# Patient Record
Sex: MALE | Race: WHITE | NOT HISPANIC OR LATINO | Employment: OTHER | URBAN - METROPOLITAN AREA
[De-identification: names, ages, dates, MRNs, and addresses within clinical notes are randomized per-mention and may not be internally consistent; named-entity substitution may affect disease eponyms.]

---

## 2017-07-13 ENCOUNTER — APPOINTMENT (OUTPATIENT)
Dept: LAB | Facility: HOSPITAL | Age: 72
End: 2017-07-13
Attending: INTERNAL MEDICINE
Payer: MEDICARE

## 2017-07-13 ENCOUNTER — ALLSCRIPTS OFFICE VISIT (OUTPATIENT)
Dept: OTHER | Facility: OTHER | Age: 72
End: 2017-07-13

## 2017-07-13 ENCOUNTER — TRANSCRIBE ORDERS (OUTPATIENT)
Dept: ADMINISTRATIVE | Facility: HOSPITAL | Age: 72
End: 2017-07-13

## 2017-07-13 DIAGNOSIS — I25.10 ATHEROSCLEROSIS OF NATIVE CORONARY ARTERY OF NATIVE HEART WITHOUT ANGINA PECTORIS: ICD-10-CM

## 2017-07-13 DIAGNOSIS — I78.1 NEVUS, NON-NEOPLASTIC: ICD-10-CM

## 2017-07-13 DIAGNOSIS — I78.1 NEVUS, NON-NEOPLASTIC: Primary | ICD-10-CM

## 2017-07-13 LAB
ALBUMIN SERPL BCP-MCNC: 3.6 G/DL (ref 3.5–5)
ALP SERPL-CCNC: 51 U/L (ref 46–116)
ALT SERPL W P-5'-P-CCNC: 31 U/L (ref 12–78)
ANION GAP SERPL CALCULATED.3IONS-SCNC: 9 MMOL/L (ref 4–13)
AST SERPL W P-5'-P-CCNC: 16 U/L (ref 5–45)
BILIRUB SERPL-MCNC: 1.4 MG/DL (ref 0.2–1)
BUN SERPL-MCNC: 16 MG/DL (ref 5–25)
CALCIUM SERPL-MCNC: 9.1 MG/DL (ref 8.3–10.1)
CHLORIDE SERPL-SCNC: 100 MMOL/L (ref 100–108)
CHOLEST SERPL-MCNC: 131 MG/DL (ref 50–200)
CO2 SERPL-SCNC: 29 MMOL/L (ref 21–32)
CREAT SERPL-MCNC: 1.18 MG/DL (ref 0.6–1.3)
GFR SERPL CREATININE-BSD FRML MDRD: >60 ML/MIN/1.73SQ M
GLUCOSE P FAST SERPL-MCNC: 118 MG/DL (ref 65–99)
HDLC SERPL-MCNC: 54 MG/DL (ref 40–60)
LDLC SERPL CALC-MCNC: 65 MG/DL (ref 0–100)
POTASSIUM SERPL-SCNC: 4.4 MMOL/L (ref 3.5–5.3)
PROT SERPL-MCNC: 7.3 G/DL (ref 6.4–8.2)
SODIUM SERPL-SCNC: 138 MMOL/L (ref 136–145)
TRIGL SERPL-MCNC: 62 MG/DL

## 2017-07-13 PROCEDURE — 36415 COLL VENOUS BLD VENIPUNCTURE: CPT | Performed by: INTERNAL MEDICINE

## 2017-07-13 PROCEDURE — 80053 COMPREHEN METABOLIC PANEL: CPT | Performed by: INTERNAL MEDICINE

## 2017-07-13 PROCEDURE — 80061 LIPID PANEL: CPT

## 2017-07-26 ENCOUNTER — ALLSCRIPTS OFFICE VISIT (OUTPATIENT)
Dept: OTHER | Facility: OTHER | Age: 72
End: 2017-07-26

## 2017-07-27 ENCOUNTER — ALLSCRIPTS OFFICE VISIT (OUTPATIENT)
Dept: OTHER | Facility: OTHER | Age: 72
End: 2017-07-27

## 2017-09-29 ENCOUNTER — APPOINTMENT (OUTPATIENT)
Dept: LAB | Facility: HOSPITAL | Age: 72
End: 2017-09-29
Payer: MEDICARE

## 2017-09-29 ENCOUNTER — TRANSCRIBE ORDERS (OUTPATIENT)
Dept: ADMINISTRATIVE | Facility: HOSPITAL | Age: 72
End: 2017-09-29

## 2017-09-29 DIAGNOSIS — Z57.8 EMPLOYEE EXPOSURE TO BLOOD: ICD-10-CM

## 2017-09-29 DIAGNOSIS — Z11.59 SCREENING EXAMINATION FOR POLIOMYELITIS: ICD-10-CM

## 2017-09-29 DIAGNOSIS — Z11.59 SCREENING EXAMINATION FOR POLIOMYELITIS: Primary | ICD-10-CM

## 2017-09-29 LAB — HCV AB SER QL: NORMAL

## 2017-09-29 PROCEDURE — 86803 HEPATITIS C AB TEST: CPT

## 2017-09-29 PROCEDURE — 36415 COLL VENOUS BLD VENIPUNCTURE: CPT

## 2017-09-29 SDOH — HEALTH STABILITY - PHYSICAL HEALTH: OCCUPATIONAL EXPOSURE TO OTHER RISK FACTORS: Z57.8

## 2017-10-18 ENCOUNTER — GENERIC CONVERSION - ENCOUNTER (OUTPATIENT)
Dept: CARDIOLOGY CLINIC | Facility: CLINIC | Age: 72
End: 2017-10-18

## 2017-10-18 ENCOUNTER — GENERIC CONVERSION - ENCOUNTER (OUTPATIENT)
Dept: OTHER | Facility: OTHER | Age: 72
End: 2017-10-18

## 2017-10-27 ENCOUNTER — GENERIC CONVERSION - ENCOUNTER (OUTPATIENT)
Dept: OTHER | Facility: OTHER | Age: 72
End: 2017-10-27

## 2018-01-09 ENCOUNTER — TRANSCRIBE ORDERS (OUTPATIENT)
Dept: ADMINISTRATIVE | Facility: HOSPITAL | Age: 73
End: 2018-01-09

## 2018-01-09 ENCOUNTER — ALLSCRIPTS OFFICE VISIT (OUTPATIENT)
Dept: OTHER | Facility: OTHER | Age: 73
End: 2018-01-09

## 2018-01-09 DIAGNOSIS — E78.5 HYPERLIPIDEMIA, UNSPECIFIED HYPERLIPIDEMIA TYPE: ICD-10-CM

## 2018-01-09 DIAGNOSIS — R00.1 SEVERE SINUS BRADYCARDIA: ICD-10-CM

## 2018-01-09 DIAGNOSIS — I47.1 PAROXYSMAL SUPRAVENTRICULAR TACHYCARDIA (HCC): Primary | ICD-10-CM

## 2018-01-10 NOTE — PROGRESS NOTES
Assessment   Assessed    1  Atrial tachycardia, paroxysmal (427 0) (I47 1)   2  Bradycardia (427 89) (R00 1)   3  Dyslipidemia (272 4) (E78 5)   4  Essential hypertension (401 9) (I10)   5  Impaired fasting glucose (790 21) (R73 01)   6  Obesity, Class I, BMI 30-34 9 (278 00) (E66 9)   7  Premature atrial contractions (427 61) (I49 1)   8  Sinus bradycardia-tachycardia syndrome (427 81) (I49 5)    Plan   Atrial tachycardia, paroxysmal, Bradycardia, Sinus bradycardia-tachycardia syndrome    · HOLTER MONITOR - 24 HOUR; Status:Active; Requested [id number]; Perform:[facility]; PQT:[id number]; Last Updated By:Samantha Watkins; 2018 10:22:54 AM;Ordered; For:Atrial tachycardia, paroxysmal, Bradycardia, Sinus bradycardia-tachycardia syndrome; Ordered By:Wayne Irvin;  Atrial tachycardia, paroxysmal, Dyslipidemia, Essential hypertension    · (1) COMPREHENSIVE METABOLIC PANEL; Status:Active; Requested [id number]; Perform:[facility]; TVV:[id number];MGXNTUL; For:Atrial tachycardia, paroxysmal, Dyslipidemia, Essential hypertension; Ordered By:Wayne Irvin;  Atrial tachycardia, paroxysmal, Essential hypertension    · EKG/ECG- POC; Status:Complete;   Done: [id number]   Perform: In Office; [id number]; Last Updated By:Diamante Watson; 2018 9:23:37 AM;Ordered; For:Atrial tachycardia, paroxysmal, Essential hypertension; Ordered By:Wayne Irvin;  Dyslipidemia, Essential hypertension    · (1) LIPID PANEL, FASTING; Status:Active; Requested [id number]; Perform:[facility]; [id number];BDGPDKS; For:Dyslipidemia, Essential hypertension; Ordered By:Wayne Irvin;           1  Continue present medications  2   Update lipids, CMP, and 24 hour Holter monitor soon  3   Six month cardiology follow-up with EKG         Discussion/Summary   Cardiology Discussion [facility]       Asymptomatic sick sinus syndrome with currently resolved marked sinus bradycardia-tachycardia  Chronic PACs with 26 1% frequency on 9/10/15 Holter monitor and known atrial tachycardia runs of 5-11 beats  Well-controlled essential hypertension and hypertensive heart disease Chronically impaired fasting glucose History of prior hyperkalemia and azotemia on ACE inhibition, currently suppressed History of right renal mass cryoablation 5/17/12 with follow-up at [facility] Controlled dyslipidemia Controlled recurrent diverticulitis Stable BPH and urinary frequency/nocturia Chronic obesity, class I, with a 6 8 pound weight gain in approximately 6 months, partially attributed to wearing of work boots  Normal nuclear stress study on 10/4/16 with 70% LVEF  Goals and Barriers: The patient has the current Goals: Maintenance of cardiovascular health  The patent has the current Barriers: None  Patient's Capacity to Self-Care: Patient is able to Self-Care  Medication SE Review and Pt Understands Tx: The treatment plan was reviewed with the patient/guardian  The patient/guardian understands and agrees with the treatment plan    Counseling Documentation With Imm: The patient was counseled regarding diagnostic results,-- instructions for management,-- risk factor reductions,-- prognosis,-- patient and family education,-- impressions,-- risks and benefits of treatment options,-- importance of compliance with treatment  Self Referrals:    Self Referrals: Yes    Transitional Care: Co-manage care with PCP/Referring Provider  Chief Complaint   Chief Complaint Free Text Note Form: Bernardo Velasquez is here today for a six month followup  He did not get his holter monitor done, or bloodwork that was ordered  He states he will do it another time  An Ekg was performed in the office today  JW      History of Present Illness   Cardiology HPI Free Text Note Form [facility]:    75-year-old man denies any current cardiopulmonary or medical symptoms  He has just returned from a trip to [place]  He had no health issues while on this trip  Review of Systems   ROS Reviewed:    ROS reviewed  All systems negative  Active Problems   Problems    1  Abnormal nuclear stress test (794 39) (R94 39)   2  Atrial tachycardia, paroxysmal (427 0) (I47 1)   3  Bradycardia (427 89) (R00 1)   4  Diverticulitis of colon (562 11) (K57 32)   5  Dyslipidemia (272 4) (E78 5)   6  Essential hypertension (401 9) (I10)   7  History of gout (V12 29) (Z87 39)   8  Impaired fasting glucose (790 21) (R73 01)   9  Obesity, Class I, BMI 30-34 9 (278 00) (E66 9)   10  Premature atrial contractions (427 61) (I49 1)   11  Sinus bradycardia-tachycardia syndrome (427 81) (I49 5)    Past Medical History   Active Problems And Past Medical History Reviewed: The active problems and past medical history were reviewed and updated today  Surgical History   Surgical History Reviewed: The surgical history was reviewed and updated today  Family History   Father    1  Family history of myocardial infarction (V17 3) (Z82 49)  Sibling    2  Family history of malignant neoplasm of prostate (V16 42) (Z80 45)  Family History Reviewed: The family history was reviewed and updated today  Social History   Problems    · Denied: History of Drug use   · Moderate alcohol use   · Never smoked tobacco (V49 89) (Z78 9)   · Single  Social History Reviewed: The social history was reviewed and is unchanged  Current Meds    1  Aspirin 81 MG TABS; TAKE 1 TABLET DAILY; Therapy: (Recorded:18Oct2017) to Recorded   2  Atorvastatin Calcium 10 MG Oral Tablet; Take 1 tablet daily; Therapy: [id number] to Recorded   3  Daily Multivitamin TABS; TAKE 1 TABLET DAILY; Therapy: (Recorded:18Oct2017) to Recorded   4  Fish Oil Concentrate 1000 MG Oral Capsule; Therapy: 92LGN7305 to Recorded   5  Lisinopril 20 MG Oral Tablet; Take 1 tablet daily; Therapy: (Recorded:52Mtx4232) to Recorded   6   Probiotic CAPS; TAKE AS DIRECTED; Therapy: (Recorded:39Yuf3902) to Recorded   7  Tamsulosin HCl - 0 4 MG Oral Capsule; Therapy: (Recorded:48Vna2772) to Recorded   8  Tart Cherry Advanced Oral Capsule; Therapy: 22FEE7647 to Recorded  Medication List Reviewed: The medication list was reviewed and updated today  Allergies   Medication    1  No Known Drug Allergies    Vitals   Vital Signs    Recorded: 56KIM7111 09:28AM   Heart Rate 70, Apical   Systolic 032, RUE, Sitting   Diastolic 80, RUE, Sitting   BP CUFF SIZE Large   Height 5 ft 6 5 in   Weight 209 lb    BMI Calculated 33 23   BSA Calculated 2 05   O2 Saturation 97, RA     Physical Exam        Constitutional      General appearance: Abnormal  -- Moderately obese white male in no acute distress  He is alert, oriented, and cooperative  Eyes      Conjunctiva and Sclera examination: Conjunctiva pink, sclera anicteric  Ears, Nose, Mouth, and Throat - Oropharynx: Clear, nares are clear, mucous membranes are moist       Neck      Neck and thyroid: Normal, supple, trachea midline, no thyromegaly  Pulmonary      Respiratory effort: No increased work of breathing or signs of respiratory distress  Auscultation of lungs: Clear to auscultation, no rales, no rhonchi, no wheezing, good air movement  Cardiovascular      Auscultation of heart: Abnormal  -- Irregular cardiac rhythm with heart rate varying from 50 bpm to 85 BPM in the sitting position  No murmur, gallop, rub, click  Carotid pulses: Normal, 2+ bilaterally  Peripheral vascular exam: Normal pulses throughout, no tenderness, erythema or swelling  Pedal pulses: Normal, 2+ bilaterally  Examination of extremities for edema and/or varicosities: Normal        Abdomen      Abdomen: Abnormal  -- Moderately obese and nontender with no masses or organomegaly  Liver and spleen: No hepatomegaly or splenomegaly  Musculoskeletal Gait and station: Normal gait  -- Digits and nails: Normal without clubbing or cyanosis  -- Inspection/palpation of joints, bones, and muscles: Normal, ROM normal        Skin - Skin and subcutaneous tissue: Normal without rashes or lesions  Skin is warm and well perfused, normal turgor  Neurologic - Cranial nerves: II - XII intact  -- Speech: Normal        Psychiatric - Orientation to person, place, and time: Normal -- Mood and affect: Normal       Results/Data   ECG Report:  1/9/18   atrial rhythm old inferior infarct R-wave progression marked sinus bradycardia and new ectopic atrial rhythm since 07/26/2017                Signatures    Electronically signed by : JETT Rosenbaum ; Jan 9 2018  7:37PM EST                       (Author)

## 2018-01-12 VITALS
WEIGHT: 200 LBS | HEART RATE: 52 BPM | BODY MASS INDEX: 31.39 KG/M2 | SYSTOLIC BLOOD PRESSURE: 110 MMHG | HEIGHT: 67 IN | TEMPERATURE: 98.5 F | OXYGEN SATURATION: 99 % | DIASTOLIC BLOOD PRESSURE: 60 MMHG

## 2018-01-13 VITALS
OXYGEN SATURATION: 96 % | SYSTOLIC BLOOD PRESSURE: 100 MMHG | WEIGHT: 201.8 LBS | BODY MASS INDEX: 31.67 KG/M2 | HEIGHT: 67 IN | HEART RATE: 45 BPM | DIASTOLIC BLOOD PRESSURE: 60 MMHG

## 2018-01-13 VITALS
BODY MASS INDEX: 31.55 KG/M2 | DIASTOLIC BLOOD PRESSURE: 72 MMHG | TEMPERATURE: 98.1 F | WEIGHT: 201.05 LBS | OXYGEN SATURATION: 95 % | HEIGHT: 67 IN | HEART RATE: 78 BPM | SYSTOLIC BLOOD PRESSURE: 120 MMHG

## 2018-01-22 VITALS
SYSTOLIC BLOOD PRESSURE: 130 MMHG | HEIGHT: 67 IN | OXYGEN SATURATION: 96 % | HEART RATE: 106 BPM | WEIGHT: 208.6 LBS | DIASTOLIC BLOOD PRESSURE: 88 MMHG | BODY MASS INDEX: 32.74 KG/M2

## 2018-01-22 VITALS — HEART RATE: 49 BPM

## 2018-01-23 VITALS
WEIGHT: 209 LBS | BODY MASS INDEX: 32.8 KG/M2 | HEIGHT: 67 IN | HEART RATE: 70 BPM | DIASTOLIC BLOOD PRESSURE: 80 MMHG | OXYGEN SATURATION: 97 % | SYSTOLIC BLOOD PRESSURE: 118 MMHG

## 2018-02-06 ENCOUNTER — HOSPITAL ENCOUNTER (OUTPATIENT)
Dept: NON INVASIVE DIAGNOSTICS | Facility: HOSPITAL | Age: 73
Discharge: HOME | End: 2018-02-06
Attending: INTERNAL MEDICINE
Payer: MEDICARE

## 2018-02-06 ENCOUNTER — APPOINTMENT (OUTPATIENT)
Dept: LAB | Facility: HOSPITAL | Age: 73
End: 2018-02-06
Attending: INTERNAL MEDICINE
Payer: MEDICARE

## 2018-02-06 DIAGNOSIS — R00.1 SEVERE SINUS BRADYCARDIA: ICD-10-CM

## 2018-02-06 DIAGNOSIS — I47.1 PAROXYSMAL SUPRAVENTRICULAR TACHYCARDIA (HCC): ICD-10-CM

## 2018-02-06 DIAGNOSIS — E78.5 HYPERLIPIDEMIA, UNSPECIFIED HYPERLIPIDEMIA TYPE: ICD-10-CM

## 2018-02-06 LAB
ALBUMIN SERPL BCP-MCNC: 3.8 G/DL (ref 3.5–5)
ALP SERPL-CCNC: 52 U/L (ref 46–116)
ALT SERPL W P-5'-P-CCNC: 45 U/L (ref 12–78)
ANION GAP SERPL CALCULATED.3IONS-SCNC: 7 MMOL/L (ref 4–13)
AST SERPL W P-5'-P-CCNC: 24 U/L (ref 5–45)
BILIRUB SERPL-MCNC: 0.8 MG/DL (ref 0.2–1)
BUN SERPL-MCNC: 23 MG/DL (ref 5–25)
CALCIUM SERPL-MCNC: 9.1 MG/DL (ref 8.3–10.1)
CHLORIDE SERPL-SCNC: 100 MMOL/L (ref 100–108)
CHOLEST SERPL-MCNC: 154 MG/DL (ref 50–200)
CO2 SERPL-SCNC: 29 MMOL/L (ref 21–32)
CREAT SERPL-MCNC: 1.07 MG/DL (ref 0.6–1.3)
GFR SERPL CREATININE-BSD FRML MDRD: 68 ML/MIN/1.73SQ M
GLUCOSE P FAST SERPL-MCNC: 118 MG/DL (ref 65–99)
HDLC SERPL-MCNC: 51 MG/DL (ref 40–60)
LDLC SERPL CALC-MCNC: 85 MG/DL (ref 0–100)
POTASSIUM SERPL-SCNC: 4.3 MMOL/L (ref 3.5–5.3)
PROT SERPL-MCNC: 7.5 G/DL (ref 6.4–8.2)
SODIUM SERPL-SCNC: 136 MMOL/L (ref 136–145)
TRIGL SERPL-MCNC: 88 MG/DL

## 2018-02-06 PROCEDURE — 93225 XTRNL ECG REC<48 HRS REC: CPT

## 2018-02-06 PROCEDURE — 93226 XTRNL ECG REC<48 HR SCAN A/R: CPT

## 2018-02-06 PROCEDURE — 80053 COMPREHEN METABOLIC PANEL: CPT

## 2018-02-06 PROCEDURE — 36415 COLL VENOUS BLD VENIPUNCTURE: CPT

## 2018-02-06 PROCEDURE — 80061 LIPID PANEL: CPT

## 2018-02-12 ENCOUNTER — TELEPHONE (OUTPATIENT)
Dept: CARDIOLOGY CLINIC | Facility: CLINIC | Age: 73
End: 2018-02-12

## 2018-02-12 PROCEDURE — 93227 XTRNL ECG REC<48 HR R&I: CPT | Performed by: INTERNAL MEDICINE

## 2018-02-12 NOTE — TELEPHONE ENCOUNTER
----- Message from Claude Leaf, MD sent at 2/12/2018 11:22 AM EST -----  Call patient regarding Holter monitor  Results show some periods of rapid heartbeat but nothing serious  Will discuss in detail at follow-up

## 2018-02-12 NOTE — TELEPHONE ENCOUNTER
Left message about holter monitor results - nothing serious and a few rapid heart beats  Dr Velázquez Courser will discuss in greater detail on next visit

## 2018-03-22 ENCOUNTER — OFFICE VISIT (OUTPATIENT)
Dept: SURGERY | Facility: CLINIC | Age: 73
End: 2018-03-22
Payer: MEDICARE

## 2018-03-22 VITALS
TEMPERATURE: 98.7 F | BODY MASS INDEX: 33.12 KG/M2 | HEIGHT: 67 IN | DIASTOLIC BLOOD PRESSURE: 78 MMHG | HEART RATE: 80 BPM | SYSTOLIC BLOOD PRESSURE: 138 MMHG | WEIGHT: 211 LBS

## 2018-03-22 DIAGNOSIS — S01.01XA LACERATION OF SCALP, INITIAL ENCOUNTER: Primary | ICD-10-CM

## 2018-03-22 PROCEDURE — 99213 OFFICE O/P EST LOW 20 MIN: CPT | Performed by: SPECIALIST

## 2018-03-22 RX ORDER — TAMSULOSIN HYDROCHLORIDE 0.4 MG/1
0.4 CAPSULE ORAL
COMMUNITY

## 2018-03-22 RX ORDER — ERGOCALCIFEROL (VITAMIN D2) 10 MCG
1 TABLET ORAL DAILY
COMMUNITY

## 2018-03-22 RX ORDER — CHLORTHALIDONE 25 MG/1
25 TABLET ORAL
COMMUNITY
End: 2018-05-03 | Stop reason: CLARIF

## 2018-03-22 RX ORDER — ATORVASTATIN CALCIUM 10 MG/1
1 TABLET, FILM COATED ORAL DAILY
COMMUNITY
Start: 2017-07-26 | End: 2019-12-18 | Stop reason: SDUPTHER

## 2018-03-22 RX ORDER — LISINOPRIL 20 MG/1
1 TABLET ORAL DAILY
COMMUNITY

## 2018-03-22 RX ORDER — CHLORAL HYDRATE 500 MG
CAPSULE ORAL
COMMUNITY
Start: 2017-07-26 | End: 2019-02-11

## 2018-03-22 NOTE — PROGRESS NOTES
Assessment/Plan:  Seda Ferreira comes today to value a laceration of the skull  This was repaired with surgical glue  It was a flap in sutures were not placed  An MRI was performed which was negative  The areas somewhat dry  I have recommended a put a small amount of bacitracin or Neosporin on this twice a day  I will check him in two weeks  Diagnoses and all orders for this visit:    Laceration of scalp, initial encounter    Other orders  -     tamsulosin (FLOMAX) 0 4 mg; Take 0 4 mg by mouth  -     Probiotic Product (PROBIOTIC-10) CAPS; Take by mouth  -     lisinopril (ZESTRIL) 20 mg tablet; Take 1 tablet by mouth daily  -     Omega-3 Fatty Acids (FISH OIL) 1,000 mg; Take by mouth  -     Multiple Vitamin (DAILY VALUE MULTIVITAMIN) TABS; Take 1 tablet by mouth daily  -     chlorthalidone 25 mg tablet; Take 25 mg by mouth  -     atorvastatin (LIPITOR) 10 mg tablet; Take 1 tablet by mouth daily  -     aspirin 81 MG tablet; Take 1 tablet by mouth daily  -     Acai 500 MG CAPS; Take by mouth  -     Misc Natural Products (TART CHERRY ADVANCED PO); Take by mouth          Subjective:     Patient ID: Sami Goode is a 74 y o  male  Seda Ferreira comes today to evaluate a laceration of the skull  He was on vacation when he fell over suitcase  He went to an emergency room in [place] where glue is applied  No sutures were applied  An MRI was performed which was negative  He was told to follow up with a surgeon and comes today for evaluation  Review of Systems   Skin:        Status post laceration of his left skull         Objective:     Physical Exam   Skin:   Well healed  Wound clean  Flap laceration  Somewhat dry  No evidence of infection

## 2018-04-05 ENCOUNTER — OFFICE VISIT (OUTPATIENT)
Dept: SURGERY | Facility: CLINIC | Age: 73
End: 2018-04-05
Payer: MEDICARE

## 2018-04-05 VITALS
WEIGHT: 211 LBS | BODY MASS INDEX: 33.12 KG/M2 | HEIGHT: 67 IN | DIASTOLIC BLOOD PRESSURE: 78 MMHG | SYSTOLIC BLOOD PRESSURE: 120 MMHG | TEMPERATURE: 98.1 F | HEART RATE: 67 BPM

## 2018-04-05 DIAGNOSIS — S01.01XD SCALP LACERATION, SUBSEQUENT ENCOUNTER: Primary | ICD-10-CM

## 2018-04-05 PROCEDURE — 99213 OFFICE O/P EST LOW 20 MIN: CPT | Performed by: SPECIALIST

## 2018-04-05 NOTE — PROGRESS NOTES
Assessment/Plan:  Narcisa Talamantes comes today to evaluate the laceration of his skull  He is doing well  The majority of the glue is still present  I will check him in three weeks  He will call sooner for any problems  There is no evidence of infection at this time  Diagnoses and all orders for this visit:    Scalp laceration, subsequent encounter          Subjective:     Patient ID: Lila Orourke is a 74 y o  male  Kriss Parcel comes back today to evaluate his laceration of the skull  This was injured in was repaired with surgical glue  He was told with, left after two weeks but most of it is still present  He has no pain  He appears to be doing well  Review of Systems  status post laceration of skull    Objective:     Physical Exam   Skin:   Healing well  Wound clean  No evidence of infection    Majority of glue is still present on the incision

## 2018-05-03 ENCOUNTER — OFFICE VISIT (OUTPATIENT)
Dept: SURGERY | Facility: CLINIC | Age: 73
End: 2018-05-03
Payer: MEDICARE

## 2018-05-03 VITALS
SYSTOLIC BLOOD PRESSURE: 120 MMHG | HEIGHT: 67 IN | WEIGHT: 211 LBS | HEART RATE: 79 BPM | BODY MASS INDEX: 33.12 KG/M2 | DIASTOLIC BLOOD PRESSURE: 78 MMHG | TEMPERATURE: 98 F

## 2018-05-03 DIAGNOSIS — S01.01XS LACERATION OF SCALP, SEQUELA: Primary | ICD-10-CM

## 2018-05-03 PROCEDURE — 99213 OFFICE O/P EST LOW 20 MIN: CPT | Performed by: SPECIALIST

## 2018-08-08 ENCOUNTER — OFFICE VISIT (OUTPATIENT)
Dept: CARDIOLOGY CLINIC | Facility: CLINIC | Age: 73
End: 2018-08-08
Payer: MEDICARE

## 2018-08-08 VITALS
SYSTOLIC BLOOD PRESSURE: 130 MMHG | DIASTOLIC BLOOD PRESSURE: 70 MMHG | HEIGHT: 66 IN | BODY MASS INDEX: 32.5 KG/M2 | WEIGHT: 202.2 LBS | HEART RATE: 57 BPM | OXYGEN SATURATION: 97 %

## 2018-08-08 DIAGNOSIS — I49.1 PREMATURE ATRIAL CONTRACTIONS: ICD-10-CM

## 2018-08-08 DIAGNOSIS — E78.5 DYSLIPIDEMIA: ICD-10-CM

## 2018-08-08 DIAGNOSIS — I47.1 ATRIAL TACHYCARDIA DETERMINED BY ELECTROCARDIOGRAPHY (HCC): ICD-10-CM

## 2018-08-08 DIAGNOSIS — I49.5 SICK SINUS SYNDROME (HCC): Primary | ICD-10-CM

## 2018-08-08 DIAGNOSIS — E66.9 CLASS 1 OBESITY: ICD-10-CM

## 2018-08-08 DIAGNOSIS — I11.9 HYPERTENSIVE HEART DISEASE WITHOUT HEART FAILURE: ICD-10-CM

## 2018-08-08 DIAGNOSIS — N40.1 BENIGN PROSTATIC HYPERPLASIA WITH LOWER URINARY TRACT SYMPTOMS, SYMPTOM DETAILS UNSPECIFIED: ICD-10-CM

## 2018-08-08 DIAGNOSIS — R73.01 IMPAIRED FASTING GLUCOSE: ICD-10-CM

## 2018-08-08 DIAGNOSIS — I10 ESSENTIAL HYPERTENSION: ICD-10-CM

## 2018-08-08 PROCEDURE — 99214 OFFICE O/P EST MOD 30 MIN: CPT | Performed by: INTERNAL MEDICINE

## 2018-08-08 PROCEDURE — 93000 ELECTROCARDIOGRAM COMPLETE: CPT | Performed by: INTERNAL MEDICINE

## 2018-08-08 NOTE — PROGRESS NOTES
Cardiology Progress Note    ASSESSMENT:    1  Asymptomatic sick sinus syndrome with currently resolved marked sinus bradycardia-tachycardia  2  Chronic PACs with 4 8% frequency on 02/06/2018 Holter monitor and known atrial tachycardia runs of up to 46 beats with fastest rate during  bpm    3  Well-controlled essential hypertension and hypertensive heart disease   4  Chronically impaired fasting glucose with most recent fasting blood glucose 118   5  History of prior hyperkalemia and azotemia on ACE inhibition, currently suppressed   6  History of right renal mass cryoablation 5/17/12 with previous follow-up at [facility], but patient now discharged from follow-up  7  Controlled dyslipidemia   8  Controlled recurrent diverticulitis   9  Stable BPH and urinary frequency/nocturia   10  Chronic obesity, class I, with a 6 8 pound weight loss in approximately 7 months, partially attributed to winter wearing of work boots  11  Normal nuclear stress study on 10/4/16 with 70% LVEF  Plan       Patient Instructions     1  Continue present medications  2   Cardiology follow-up in approximately 6 months with EKG and no lab work  3   Patient is having lab work in the near future and will have copies sent to me  HPI    This 74 y o  male  denies new cardiopulmonary and medical symptoms  The patient traveled to [facility] and [place] for 4 weeks from May to June of this year  He just came back from a weekend [address] [facility] and was recently in [facility] for a wedding  He will be traveling to [facility] after Thanksgiving  The patient just saw his internist Dr Lynda Trujillo MD today, who ordered some additional blood work, which the patient will have copied to me  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[place], and in [place], and has mainly commercial real estate      The patient's lab work from 02/06/2018 was reviewed and included normal lipid panel, CMP, except glucose of 118  His Holter monitor from 02/06/2018 was reviewed and showed 4 8% density of PACs and 0 8% PVCs with 41 episodes of SVT including longest run 46 beats and fastest rate 140 bpm   There were no correlating symptoms  Review of Systems    All other systems negative, except as noted in history of present illness    Historical Information   Past Medical History:   Diagnosis Date    Atrial tachycardia, paroxysmal ([facility] ) 01/09/2018    from allscripts chart    [facility]    [facility]    Dyslipidemia     Essential hypertension     Impaired fasting glucose     Premature atrial contractions     Sinus bradycardia-tachycardia syndrome ([facility] )      History reviewed  No pertinent surgical history  History   Alcohol Use    1 2 oz/week    2 Glasses of wine per week     Comment: a day      History   Drug Use No     History   Smoking Status    Never Smoker   Smokeless Tobacco    Never Used       Family History:  Family History   Problem Relation Age of Onset    Microcephaly Father     Heart attack Father          Meds/Allergies     Prior to Admission medications    Medication Sig Start Date End Date Taking?  Authorizing Provider   Acai 500 MG CAPS Take by mouth   Yes Historical Provider, MD   aspirin 81 MG tablet Take 1 tablet by mouth daily   Yes Historical Provider, MD   atorvastatin (LIPITOR) 10 mg tablet Take 1 tablet by mouth daily 7/26/17  Yes Historical Provider, MD   lisinopril (ZESTRIL) 20 mg tablet Take 1 tablet by mouth daily   Yes Historical Provider, MD   Misc Natural Products (TART CHERRY ADVANCED PO) Take by mouth   Yes Historical Provider, MD   Multiple Vitamin (DAILY VALUE MULTIVITAMIN) TABS Take 1 tablet by mouth daily   Yes Historical Provider, MD   Omega-3 Fatty Acids (FISH OIL) 1,000 mg Take by mouth 7/26/17  Yes Historical Provider, MD   Probiotic Product (PROBIOTIC-10) CAPS Take by mouth   Yes Historical Provider, MD   tamsulosin (FLOMAX) 0 4 mg Take 0 4 mg by mouth   Yes Historical Provider, MD       No Known Allergies      Vitals:    08/08/18 1259   BP: 130/70   BP Location: Right arm   Patient Position: Sitting   Cuff Size: Large   Pulse: 57   SpO2: 97%   Weight: 91 7 kg (202 lb 3 2 oz)   Height: 5' 5 75" (1 67 m)       Body mass index is 32 88 kg/m²  6 8 pound weight loss in approximately 7 months    Physical Exam:    General Appearance:  Alert, cooperative, no distress, appears stated age and is moderately obese  Head:  Normocephalic, without obvious abnormality, atraumatic   Eyes:  PERRL, conjunctiva/corneas clear, EOM's intact,   both eyes   Ears:  Normal TM's and external ear canals, both ears   Nose: Nares normal, septum midline, mucosa normal, no drainage or sinus tenderness   Throat: Lips, mucosa, and tongue normal; teeth and gums normal   Neck: Supple, symmetrical, trachea midline, no adenopathy, thyroid: not enlarged, symmetric, no tenderness/mass/nodules, no carotid bruit or JVD   Back:   Symmetric, no curvature, ROM normal, no CVA tenderness   Lungs:   Clear to auscultation bilaterally, respirations unlabored   Chest Wall:  No tenderness or deformity   Heart:  Irregular cardiac rhythm, S1, S2 normal, no murmur, rub or gallop   Abdomen:   Soft, non-tender, bowel sounds active all four quadrants,  no masses, no organomegaly with moderate obesity noted   Extremities: Extremities normal, atraumatic, no cyanosis or edema   Pulses: 2+ and symmetric   Skin: Skin showed normal color, texture, turgor and no rashes or lesions   Lymph nodes: Cervical, supraclavicular, and axillary nodes normal   Neurologic: Normal         Cardiographics    ECG 08/08/2018:    Sinus rhythm with frequent PACs and aberrant conduction  Nonspecific inferior and lateral precordial T abnormalities  Rule out old inferior infarct  Poor R-wave progression  Faster heart rate with more frequent PACs compared to 07/26/2017      Imaging    Chest X-Ray :  No Chest XR results available for this patient            Lab Review       Lab Results   Component Value Date     02/06/2018    K 4 3 02/06/2018     02/06/2018    CO2 29 02/06/2018    ANIONGAP 7 02/06/2018    BUN 23 02/06/2018    CREATININE 1 07 02/06/2018    GLUCOSE 111 04/20/2016    GLUF 118 (H) 02/06/2018    CALCIUM 9 1 02/06/2018    AST 24 02/06/2018    ALT 45 02/06/2018    ALKPHOS 52 02/06/2018    PROT 7 5 02/06/2018    BILITOT 0 80 02/06/2018    EGFR 68 02/06/2018       Lab Results   Component Value Date    CHOL 154 02/06/2018    CHOL 131 07/13/2017    CHOL 144 04/20/2016     Lab Results   Component Value Date    HDL 51 02/06/2018    HDL 54 07/13/2017    HDL 44 04/20/2016     Lab Results   Component Value Date    LDLCALC 85 02/06/2018    LDLCALC 65 07/13/2017    LDLCALC 85 04/20/2016     Lab Results   Component Value Date    TRIG 88 02/06/2018    TRIG 62 07/13/2017    TRIG 76 04/20/2016     No components found for: CHOLHDL    Lab Results   Component Value Date    GLUCOSE 111 04/20/2016    CALCIUM 9 1 02/06/2018     02/06/2018    K 4 3 02/06/2018    CO2 29 02/06/2018     02/06/2018    BUN 23 02/06/2018    CREATININE 1 07 02/06/2018           Ngozi Xiong MD

## 2018-09-17 ENCOUNTER — APPOINTMENT (OUTPATIENT)
Dept: LAB | Facility: HOSPITAL | Age: 73
End: 2018-09-17
Payer: MEDICARE

## 2018-09-17 ENCOUNTER — TRANSCRIBE ORDERS (OUTPATIENT)
Dept: ADMINISTRATIVE | Facility: HOSPITAL | Age: 73
End: 2018-09-17

## 2018-09-17 ENCOUNTER — TELEPHONE (OUTPATIENT)
Dept: CARDIOLOGY CLINIC | Facility: CLINIC | Age: 73
End: 2018-09-17

## 2018-09-17 DIAGNOSIS — N13.8 ENLARGED PROSTATE WITH URINARY OBSTRUCTION: ICD-10-CM

## 2018-09-17 DIAGNOSIS — Z80.42 FAMILY HISTORY OF MALIGNANT NEOPLASM OF PROSTATE: ICD-10-CM

## 2018-09-17 DIAGNOSIS — E78.00 PURE HYPERCHOLESTEROLEMIA: ICD-10-CM

## 2018-09-17 DIAGNOSIS — I10 ESSENTIAL HYPERTENSION, MALIGNANT: Primary | ICD-10-CM

## 2018-09-17 DIAGNOSIS — I10 ESSENTIAL HYPERTENSION, MALIGNANT: ICD-10-CM

## 2018-09-17 DIAGNOSIS — G63 GOUTY NEURITIS (HCC): ICD-10-CM

## 2018-09-17 DIAGNOSIS — I25.10 ATHEROSCLEROSIS OF NATIVE CORONARY ARTERY OF NATIVE HEART WITHOUT ANGINA PECTORIS: ICD-10-CM

## 2018-09-17 DIAGNOSIS — M10.00 GOUTY NEURITIS (HCC): ICD-10-CM

## 2018-09-17 DIAGNOSIS — N40.1 ENLARGED PROSTATE WITH URINARY OBSTRUCTION: ICD-10-CM

## 2018-09-17 DIAGNOSIS — E78.00 PURE HYPERCHOLESTEROLEMIA: Primary | ICD-10-CM

## 2018-09-17 LAB
ALBUMIN SERPL BCP-MCNC: 3.9 G/DL (ref 3.5–5)
ALP SERPL-CCNC: 46 U/L (ref 46–116)
ALT SERPL W P-5'-P-CCNC: 38 U/L (ref 12–78)
ANION GAP SERPL CALCULATED.3IONS-SCNC: 5 MMOL/L (ref 4–13)
AST SERPL W P-5'-P-CCNC: 23 U/L (ref 5–45)
BASOPHILS # BLD AUTO: 0.06 THOUSANDS/ΜL (ref 0–0.1)
BASOPHILS NFR BLD AUTO: 1 % (ref 0–1)
BILIRUB SERPL-MCNC: 1.1 MG/DL (ref 0.2–1)
BILIRUB UR QL STRIP: NEGATIVE
BUN SERPL-MCNC: 11 MG/DL (ref 5–25)
CALCIUM SERPL-MCNC: 8.6 MG/DL (ref 8.3–10.1)
CHLORIDE SERPL-SCNC: 101 MMOL/L (ref 100–108)
CHOLEST SERPL-MCNC: 149 MG/DL (ref 50–200)
CLARITY UR: CLEAR
CO2 SERPL-SCNC: 28 MMOL/L (ref 21–32)
COLOR UR: NORMAL
CREAT SERPL-MCNC: 0.93 MG/DL (ref 0.6–1.3)
EOSINOPHIL # BLD AUTO: 0.2 THOUSAND/ΜL (ref 0–0.61)
EOSINOPHIL NFR BLD AUTO: 3 % (ref 0–6)
ERYTHROCYTE [DISTWIDTH] IN BLOOD BY AUTOMATED COUNT: 14.1 % (ref 11.6–15.1)
GFR SERPL CREATININE-BSD FRML MDRD: 80 ML/MIN/1.73SQ M
GLUCOSE P FAST SERPL-MCNC: 95 MG/DL (ref 65–99)
GLUCOSE UR STRIP-MCNC: NEGATIVE MG/DL
HCT VFR BLD AUTO: 43.3 % (ref 36.5–49.3)
HDLC SERPL-MCNC: 49 MG/DL (ref 40–60)
HGB BLD-MCNC: 14.2 G/DL (ref 12–17)
HGB UR QL STRIP.AUTO: NEGATIVE
IMM GRANULOCYTES # BLD AUTO: 0.06 THOUSAND/UL (ref 0–0.2)
IMM GRANULOCYTES NFR BLD AUTO: 1 % (ref 0–2)
KETONES UR STRIP-MCNC: NEGATIVE MG/DL
LDLC SERPL CALC-MCNC: 78 MG/DL (ref 0–100)
LEUKOCYTE ESTERASE UR QL STRIP: NEGATIVE
LYMPHOCYTES # BLD AUTO: 2.61 THOUSANDS/ΜL (ref 0.6–4.47)
LYMPHOCYTES NFR BLD AUTO: 36 % (ref 14–44)
MCH RBC QN AUTO: 31.1 PG (ref 26.8–34.3)
MCHC RBC AUTO-ENTMCNC: 32.8 G/DL (ref 31.4–37.4)
MCV RBC AUTO: 95 FL (ref 82–98)
MONOCYTES # BLD AUTO: 0.75 THOUSAND/ΜL (ref 0.17–1.22)
MONOCYTES NFR BLD AUTO: 10 % (ref 4–12)
NEUTROPHILS # BLD AUTO: 3.62 THOUSANDS/ΜL (ref 1.85–7.62)
NEUTS SEG NFR BLD AUTO: 49 % (ref 43–75)
NITRITE UR QL STRIP: NEGATIVE
NONHDLC SERPL-MCNC: 100 MG/DL
NRBC BLD AUTO-RTO: 0 /100 WBCS
PH UR STRIP.AUTO: 6.5 [PH] (ref 5–9)
PLATELET # BLD AUTO: 156 THOUSANDS/UL (ref 149–390)
PMV BLD AUTO: 10.4 FL (ref 8.9–12.7)
POTASSIUM SERPL-SCNC: 4 MMOL/L (ref 3.5–5.3)
PROT SERPL-MCNC: 7.1 G/DL (ref 6.4–8.2)
PROT UR STRIP-MCNC: NEGATIVE MG/DL
RBC # BLD AUTO: 4.56 MILLION/UL (ref 3.88–5.62)
SODIUM SERPL-SCNC: 134 MMOL/L (ref 136–145)
SP GR UR STRIP.AUTO: <=1.005 (ref 1–1.03)
TRIGL SERPL-MCNC: 112 MG/DL
TSH SERPL DL<=0.05 MIU/L-ACNC: 2.48 UIU/ML (ref 0.36–3.74)
URATE SERPL-MCNC: 8 MG/DL (ref 4.2–8)
UROBILINOGEN UR QL STRIP.AUTO: 0.2 E.U./DL
WBC # BLD AUTO: 7.3 THOUSAND/UL (ref 4.31–10.16)

## 2018-09-17 PROCEDURE — 84443 ASSAY THYROID STIM HORMONE: CPT

## 2018-09-17 PROCEDURE — 85025 COMPLETE CBC W/AUTO DIFF WBC: CPT | Performed by: INTERNAL MEDICINE

## 2018-09-17 PROCEDURE — 81003 URINALYSIS AUTO W/O SCOPE: CPT | Performed by: INTERNAL MEDICINE

## 2018-09-17 PROCEDURE — 84550 ASSAY OF BLOOD/URIC ACID: CPT

## 2018-09-17 PROCEDURE — 80061 LIPID PANEL: CPT | Performed by: INTERNAL MEDICINE

## 2018-09-17 PROCEDURE — 80053 COMPREHEN METABOLIC PANEL: CPT | Performed by: INTERNAL MEDICINE

## 2018-09-17 PROCEDURE — 36415 COLL VENOUS BLD VENIPUNCTURE: CPT | Performed by: INTERNAL MEDICINE

## 2019-02-01 ENCOUNTER — TELEPHONE (OUTPATIENT)
Dept: CARDIOLOGY CLINIC | Facility: CLINIC | Age: 74
End: 2019-02-01

## 2019-02-01 ENCOUNTER — DOCUMENTATION (OUTPATIENT)
Dept: CARDIOLOGY CLINIC | Facility: CLINIC | Age: 74
End: 2019-02-01

## 2019-02-01 ENCOUNTER — OFFICE VISIT (OUTPATIENT)
Dept: CARDIOLOGY CLINIC | Facility: CLINIC | Age: 74
End: 2019-02-01
Payer: MEDICARE

## 2019-02-01 VITALS
HEART RATE: 111 BPM | SYSTOLIC BLOOD PRESSURE: 112 MMHG | BODY MASS INDEX: 33.27 KG/M2 | HEIGHT: 66 IN | WEIGHT: 207 LBS | DIASTOLIC BLOOD PRESSURE: 70 MMHG | OXYGEN SATURATION: 96 %

## 2019-02-01 DIAGNOSIS — I47.1 ATRIAL TACHYCARDIA, PAROXYSMAL (HCC): Primary | ICD-10-CM

## 2019-02-01 DIAGNOSIS — E66.9 CLASS 1 OBESITY: ICD-10-CM

## 2019-02-01 DIAGNOSIS — I49.5 SICK SINUS SYNDROME (HCC): ICD-10-CM

## 2019-02-01 DIAGNOSIS — I10 ESSENTIAL HYPERTENSION: ICD-10-CM

## 2019-02-01 DIAGNOSIS — E78.5 DYSLIPIDEMIA: ICD-10-CM

## 2019-02-01 DIAGNOSIS — I11.9 HYPERTENSIVE HEART DISEASE WITHOUT HEART FAILURE: ICD-10-CM

## 2019-02-01 DIAGNOSIS — I49.1 PREMATURE ATRIAL CONTRACTIONS: ICD-10-CM

## 2019-02-01 PROCEDURE — 99215 OFFICE O/P EST HI 40 MIN: CPT | Performed by: INTERNAL MEDICINE

## 2019-02-01 PROCEDURE — 93000 ELECTROCARDIOGRAM COMPLETE: CPT | Performed by: INTERNAL MEDICINE

## 2019-02-01 NOTE — PATIENT INSTRUCTIONS
1  Patient will obtain pulse oximeter for home use and monitor heart rate until recheck  2  Follow-up in 1 week with rhythm strip  3  Dr Rene Cortez will transmit current EKG and ask EP to review current and prior EKGs and let me know what their thoughts are on current rhythm  4  If this tachydysrhythmia persists, will consider low-dose metoprolol succinate and possible oral anticoagulation at the time of next visit

## 2019-02-01 NOTE — TELEPHONE ENCOUNTER
Received fax from Dr Ervin Mooney office, asking Dr Jessica Steinberg to please review EKG and OV from today for a second opinion  EKG scanned under media tab  Please advise, Thank you

## 2019-02-01 NOTE — PROGRESS NOTES
Cardiology Progress Note    ASSESSMENT:    1  Asymptomatic sick sinus syndrome with sustained atrial tachycardia or atypical flutter with 3:1 AV block and currently  resolved marked sinus bradycardia  Prior history of asymptomatic tachycardia-bradycardia phenomenon  2  Chronic PACs with 4 8% frequency on 02/06/2018 Holter monitor and known atrial tachycardia runs of up to 46 beats with fastest rate during  bpm    3  Well-controlled essential hypertension and hypertensive heart disease   4  Chronically impaired fasting glucose with most recent fasting blood glucose 95   5  History of prior hyperkalemia and azotemia on ACE inhibition, currently suppressed   6  History of right renal mass cryoablation 5/17/12 with previous follow-up at [facility], but patient now discharged from follow-up  7  Controlled dyslipidemia with rising triglyceride level, owing to weight gain  8  Controlled recurrent diverticulitis   9  Stable BPH and urinary frequency/nocturia   10  Chronic obesity, class I, with a 4 8 pound weight gain  in approximately 6  months  11  Normal nuclear stress study on 10/4/16 with 70% LVEF  Plan       Patient Instructions     1  Patient will obtain pulse oximeter for home use and monitor heart rate until office recheck  2  Follow-up in 1 week with rhythm strip with Dr Alexsanedr Crabtree  3  Dr Alexsander Crabtree will transmit current EKG and ask EP to review current and prior EKGs and let me know what their thoughts are on current rhythm  4  If this tachydysrhythmia persists, will consider low-dose metoprolol succinate and possible oral anticoagulation at the time of next visit  HPI    This 66 y o  male  denies new cardiopulmonary and medical symptoms  He specifically denies palpitation, dyspnea, chest discomfort, dizziness, near-syncope, syncope, cough, wheezing, sputum production, fever, chills  There has been no unusual fatigue or decline in energy level      Because of new atypical atrial flutter or tachycardia on today's EKG, a prolonged discussion was held with the patient explaining that diagnosis and  implications  Although he is asymptomatic, it was explained to him that there is a chance that this could represent a rhythm disturbance that could lead to intracardiac clot or thrombus formation, which in turn could result in a cardioembolic event  After prolonged discussion with the patient,  it was recommended that he purchase a pulse oximeter for heart rate monitoring with usage twice a day  It was also suggested that he start on a heart rate limiting medication and possible oral anticoagulation, but the patient preferred to delay this decision for 1 more week with the thought that this may be a transient phenomenon and not a persistent one  Prior Holter monitor 1 year ago did show runs of PAT with maximum duration of 46 beats and maximum heart rate averaging 140 bpm   These were all asymptomatic  Review of Systems    All other systems negative, except as noted in history of present illness    Historical Information   Past Medical History:   Diagnosis Date    Atrial tachycardia, paroxysmal ([address] ) 01/09/2018    from [facility] chart    Bradycardia 01/09/2018    from [facility] chart    Dyslipidemia     Essential hypertension     Impaired fasting glucose     Premature atrial contractions     Sinus bradycardia-tachycardia syndrome ([address] )      History reviewed  No pertinent surgical history  History   Alcohol Use    1 2 oz/week    2 Glasses of wine per week     Comment: a day      History   Drug Use No     History   Smoking Status    Never Smoker   Smokeless Tobacco    Never Used       Family History:  Family History   Problem Relation Age of Onset    Microcephaly Father     Heart attack Father          Meds/Allergies     Prior to Admission medications    Medication Sig Start Date End Date Taking?  Authorizing Provider   Acai 500 MG CAPS Take by mouth   Yes Historical Provider, MD   aspirin 81 MG tablet Take 1 tablet by mouth daily   Yes Historical Provider, MD   atorvastatin (LIPITOR) 10 mg tablet Take 1 tablet by mouth daily 7/26/17  Yes Historical Provider, MD   lisinopril (ZESTRIL) 20 mg tablet Take 1 tablet by mouth daily   Yes Historical Provider, MD   Misc Natural Products (TART CHERRY ADVANCED PO) Take by mouth   Yes Historical Provider, MD   Multiple Vitamin (DAILY VALUE MULTIVITAMIN) TABS Take 1 tablet by mouth daily   Yes Historical Provider, MD   Omega-3 Fatty Acids (FISH OIL) 1,000 mg Take by mouth 7/26/17  Yes Historical Provider, MD   Probiotic Product (PROBIOTIC-10) CAPS Take by mouth   Yes Historical Provider, MD   tamsulosin (FLOMAX) 0 4 mg Take 0 4 mg by mouth   Yes Historical Provider, MD       No Known Allergies      Vitals:    02/01/19 0803   BP: 112/70   BP Location: Left arm   Patient Position: Sitting   Cuff Size: Standard   Pulse: (!) 111   SpO2: 96%   Weight: 93 9 kg (207 lb)   Height: 5' 5 75" (1 67 m)       Body mass index is 33 67 kg/m²    4 8 pound weight gain in approximately 6 months    Physical Exam:    General Appearance:  Alert, cooperative, no distress, appears stated age and moderately obese   Head:  Normocephalic, without obvious abnormality, atraumatic   Eyes:  PERRL, conjunctiva/corneas clear, EOM's intact,   both eyes   Ears:  Normal TM's and external ear canals, both ears   Nose: Nares normal, septum midline, mucosa normal, no drainage or sinus tenderness   Throat: Lips, mucosa, and tongue normal; teeth and gums normal   Neck: Supple, symmetrical, trachea midline, no adenopathy, thyroid: not enlarged, symmetric, no tenderness/mass/nodules, no carotid bruit or JVD   Back:   Symmetric, no curvature, ROM normal, no CVA tenderness   Lungs:   Clear to auscultation bilaterally, respirations unlabored   Chest Wall:  No tenderness or deformity   Heart:  Regular tachycardia with average apical rate of 110 bpm, S1, S2 normal, no murmur, rub or gallop Abdomen:   Soft, non-tender, bowel sounds active all four quadrants,  no masses, no organomegaly and moderately obese   Extremities: Extremities normal, atraumatic, no cyanosis or edema   Pulses: 2+ and symmetric   Skin: Skin showed normal color, texture, turgor and no rashes or lesions   Lymph nodes: Cervical, supraclavicular, and axillary nodes normal   Neurologic: Normal         Cardiographics    ECG 02/01/2019 :    Atrial tachycardia or atypical atrial flutter with 3:1 AV block; atrial rate estimated at 300 bpm  LAFB  Poor R-wave progression  New PAT or atypical atrial flutter and new LAFB compared to 08/02/2018  Imaging    Chest X-Ray :  No Chest XR results available for this patient            Lab Review     09/17/2018 uric acid, TSH, UA, CBC:  All normal    Lab Results   Component Value Date    SODIUM 134 (L) 09/17/2018    K 4 0 09/17/2018     09/17/2018    CO2 28 09/17/2018    BUN 11 09/17/2018    CREATININE 0 93 09/17/2018    GLUF 95 09/17/2018    CALCIUM 8 6 09/17/2018    AST 23 09/17/2018    ALT 38 09/17/2018    ALKPHOS 46 09/17/2018    EGFR 80 09/17/2018       Lab Results   Component Value Date    CHOLESTEROL 149 09/17/2018    CHOLESTEROL 154 02/06/2018    CHOLESTEROL 131 07/13/2017     Lab Results   Component Value Date    HDL 49 09/17/2018    HDL 51 02/06/2018    HDL 54 07/13/2017     No results found for: LDLCHOLEST  Lab Results   Component Value Date    LDLCALC 78 09/17/2018    LDLCALC 85 02/06/2018    LDLCALC 65 07/13/2017     No components found for: [facility]  Lab Results   Component Value Date    TRIG 112 09/17/2018    TRIG 88 02/06/2018    TRIG 62 07/13/2017         Lab Results   Component Value Date    CALCIUM 8 6 09/17/2018    K 4 0 09/17/2018    CO2 28 09/17/2018     09/17/2018    BUN 11 09/17/2018    CREATININE 0 93 09/17/2018           Kareem Solomon MD

## 2019-02-01 NOTE — PROGRESS NOTES
Spoke by telephone with Dr Kamilah Wade at 12:45 p m  on 02/01/2019  It was his opinion that the patient's EKG showed atypical atrial flutter with 3:1 AV block as the most likely rhythm  He suggested a 48 hour Holter monitor, if the patient's rhythm has not changed by the time of his next visit next week  He felt that the rhythm mechanism might be clarified during sleep with a higher degree of AV block by doing that type of study

## 2019-02-11 ENCOUNTER — OFFICE VISIT (OUTPATIENT)
Dept: CARDIOLOGY CLINIC | Facility: CLINIC | Age: 74
End: 2019-02-11
Payer: MEDICARE

## 2019-02-11 VITALS
OXYGEN SATURATION: 98 % | SYSTOLIC BLOOD PRESSURE: 122 MMHG | HEIGHT: 66 IN | HEART RATE: 49 BPM | DIASTOLIC BLOOD PRESSURE: 70 MMHG | BODY MASS INDEX: 33.27 KG/M2 | WEIGHT: 207 LBS

## 2019-02-11 DIAGNOSIS — I48.92 PAROXYSMAL ATRIAL FLUTTER (HCC): Primary | ICD-10-CM

## 2019-02-11 PROCEDURE — 99215 OFFICE O/P EST HI 40 MIN: CPT | Performed by: INTERNAL MEDICINE

## 2019-02-11 PROCEDURE — 93000 ELECTROCARDIOGRAM COMPLETE: CPT | Performed by: INTERNAL MEDICINE

## 2019-02-11 NOTE — PATIENT INSTRUCTIONS
1  Patient was given 2 week supply of Eliquis and a prescription for 30 day supply of Eliquis 5 milligrams b i d  was sent to his local pharmacy  Patient was provided with a card which should give him a free initial 30 day supply of Eliquis  He will then have a 6 week supply to start with  2  Discontinue aspirin and fish oil to diminished bleeding risk  3  We provided a referral to see Dr Moon Phillips or Kvng Castaneda of the EP service and will try to arrange catheter ablation assessment as soon as possible  4  Patient is reluctant to take long-term anticoagulation and is high risk to take any anti dysrhythmic therapy because of baseline chronic sinus bradycardia  5  Follow-up will depend on assessment and treatment by  Dr Moon Phillips or Kvng Castaneda of the EP service

## 2019-02-11 NOTE — PROGRESS NOTES
Cardiology Progress Note    ASSESSMENT:    1  Asymptomatic sick sinus syndrome with  intermittent frequent atypical flutter with 3:1 AV block and currently   intermittent marked sinus bradycardia  Prior history of asymptomatic tachycardia-bradycardia phenomenon  2  Chronic PACs with 4 8% frequency on 02/06/2018 Holter monitor and known atrial tachycardia runs of up to 46 beats with fastest rate during  bpm    3  Well-controlled essential hypertension and hypertensive heart disease   4  Chronically impaired fasting glucose with most recent fasting blood glucose 95   5  History of prior hyperkalemia and azotemia on ACE inhibition, currently suppressed   6  History of right renal mass cryoablation 5/17/12 with previous follow-up at [facility], but patient now discharged from follow-up     7  Controlled dyslipidemia with rising triglyceride level, owing to weight gain  8  Controlled recurrent diverticulitis   9  Stable BPH and urinary frequency/nocturia   10  Chronic obesity, class I, with a 4 8 pound weight gain  in approximately 6  months  11  Normal nuclear stress study on 10/4/16 with 70% LVEF             Plan       Patient Instructions     1  Patient was given 2 week supply of Eliquis and a prescription for 30 day supply of Eliquis 5 milligrams b i d  was sent to his local pharmacy  Patient was provided with a card which should give him a free initial 30 day supply of Eliquis  He will then have a 6 week supply to start with  2  Discontinue aspirin and fish oil to diminished bleeding risk  3  We provided a referral to see Dr Blane Pittman or Zachary Schneider of the EP service and will try to arrange catheter ablation assessment as soon as possible  4  Patient is reluctant to take long-term anticoagulation and is high risk to take any anti dysrhythmic therapy because of baseline chronic sinus bradycardia    5  Follow-up will depend on assessment and treatment by  Dr Blane Pittman or Zachary Schneider of the EP service  HPI    This 66 y o  male  denies new cardiopulmonary and medical symptoms  Review of Systems    All other systems negative, except as noted in history of present illness    Historical Information   Past Medical History:   Diagnosis Date    Atrial tachycardia, paroxysmal ([facility] [address] ) 01/09/2018    from [facility]scripts chart    Bradycardia 01/09/2018    from [facility]scripts chart    Dyslipidemia     Essential hypertension     Impaired fasting glucose     Premature atrial contractions     Sinus bradycardia-tachycardia syndrome ([facility] )      History reviewed  No pertinent surgical history  Social History     Substance and Sexual Activity   Alcohol Use Yes    Alcohol/week: 1 2 oz    Types: 2 Glasses of wine per week    Comment: a day      Social History     Substance and Sexual Activity   Drug Use No     Social History     Tobacco Use   Smoking Status Never Smoker   Smokeless Tobacco Never Used       Family History:  Family History   Problem Relation Age of Onset    Microcephaly Father     Heart attack Father          Meds/Allergies     Prior to Admission medications    Medication Sig Start Date End Date Taking?  Authorizing Provider   Acai 500 MG CAPS Take by mouth   Yes Historical Provider, MD   atorvastatin (LIPITOR) 10 mg tablet Take 1 tablet by mouth daily 7/26/17  Yes Historical Provider, MD   lisinopril (ZESTRIL) 20 mg tablet Take 1 tablet by mouth daily   Yes Historical Provider, MD   Misc Natural Products (TART CHERRY ADVANCED PO) Take by mouth   Yes Historical Provider, MD   Multiple Vitamin (DAILY VALUE MULTIVITAMIN) TABS Take 1 tablet by mouth daily   Yes Historical Provider, MD   Omega-3 Fatty Acids (FISH OIL) 1,000 mg Take by mouth 7/26/17  Yes Historical Provider, MD   tamsulosin (FLOMAX) 0 4 mg Take 0 4 mg by mouth   Yes Historical Provider, MD   aspirin 81 MG tablet Take 1 tablet by mouth daily  2/11/19 Yes Historical Provider, MD   apixaban (ELIQUIS) 5 mg Take 1 tablet (5 mg total) by mouth 2 (two) times a day 2/11/19   Erick Hay MD   Probiotic Product (PROBIOTIC-10) CAPS Take by mouth    Historical Provider, MD       No Known Allergies      Vitals:    02/11/19 1121   BP: 122/70   BP Location: Left arm   Patient Position: Sitting   Cuff Size: Standard   Pulse: (!) 49   SpO2: 98%   Weight: 93 9 kg (207 lb)   Height: 5' 5 75" (1 67 m)       Body mass index is 33 67 kg/m²      Physical Exam:    General Appearance:  Alert, cooperative, no distress, appears stated age and is moderately obese   Head:  Normocephalic, without obvious abnormality, atraumatic   Eyes:  PERRL, conjunctiva/corneas clear, EOM's intact,   both eyes   Ears:  Normal TM's and external ear canals, both ears   Nose: Nares normal, septum midline, mucosa normal, no drainage or sinus tenderness   Throat: Lips, mucosa, and tongue normal; teeth and gums normal   Neck: Supple, symmetrical, trachea midline, no adenopathy, thyroid: not enlarged, symmetric, no tenderness/mass/nodules, no carotid bruit or JVD   Back:   Symmetric, no curvature, ROM normal, no CVA tenderness   Lungs:   Clear to auscultation bilaterally, respirations unlabored   Chest Wall:  No tenderness or deformity   Heart:  Irregular cardiac rhythm, S1, S2 normal, no murmur, rub or gallop   Abdomen:   Soft, non-tender, bowel sounds active all four quadrants,  no masses, no organomegaly with moderate obesity noted   Extremities: Extremities normal, atraumatic, no cyanosis or edema   Pulses: 2+ and symmetric   Skin: Skin showed normal color, texture, turgor and no rashes or lesions   Lymph nodes: Cervical, supraclavicular, and axillary nodes normal   Neurologic: Normal         Cardiographics    ECG  rhythm strip 02/11/2019:    Initial portion of rhythm strip showed sinus bradycardia at 52 bpm followed by atypical atrial flutter with 3:1 AV block at rate of 110 bpm, gradually slowing to heart rate of 102 bpm    Imaging    Chest X-Ray :  No Chest XR results available for this patient  Lab Review       Lab Results   Component Value Date    SODIUM 134 (L) 09/17/2018    K 4 0 09/17/2018     09/17/2018    CO2 28 09/17/2018    BUN 11 09/17/2018    CREATININE 0 93 09/17/2018    GLUF 95 09/17/2018    CALCIUM 8 6 09/17/2018    AST 23 09/17/2018    ALT 38 09/17/2018    ALKPHOS 46 09/17/2018    EGFR 80 09/17/2018       Lab Results   Component Value Date    CHOLESTEROL 149 09/17/2018    CHOLESTEROL 154 02/06/2018    CHOLESTEROL 131 07/13/2017     Lab Results   Component Value Date    HDL 49 09/17/2018    HDL 51 02/06/2018    HDL 54 07/13/2017     No results found for: LDLCHOLEST  Lab Results   Component Value Date    LDLCALC 78 09/17/2018    LDLCALC 85 02/06/2018    LDLCALC 65 07/13/2017     No components found for: [facility]  Lab Results   Component Value Date    TRIG 112 09/17/2018    TRIG 88 02/06/2018    TRIG 62 07/13/2017         Lab Results   Component Value Date    CALCIUM 8 6 09/17/2018    K 4 0 09/17/2018    CO2 28 09/17/2018     09/17/2018    BUN 11 09/17/2018    CREATININE 0 93 09/17/2018     Face-to-face conference time consumed 43 minutes in the office today        Sowmya May MD

## 2019-02-21 ENCOUNTER — OFFICE VISIT (OUTPATIENT)
Dept: CARDIOLOGY CLINIC | Facility: CLINIC | Age: 74
End: 2019-02-21
Payer: MEDICARE

## 2019-02-21 VITALS
DIASTOLIC BLOOD PRESSURE: 76 MMHG | WEIGHT: 209.8 LBS | HEART RATE: 41 BPM | SYSTOLIC BLOOD PRESSURE: 126 MMHG | BODY MASS INDEX: 33.72 KG/M2 | HEIGHT: 66 IN

## 2019-02-21 DIAGNOSIS — I30.0 IDIOPATHIC PERICARDITIS, UNSPECIFIED CHRONICITY: Primary | ICD-10-CM

## 2019-02-21 DIAGNOSIS — I48.92 PAROXYSMAL ATRIAL FLUTTER (HCC): ICD-10-CM

## 2019-02-21 PROCEDURE — 93000 ELECTROCARDIOGRAM COMPLETE: CPT | Performed by: INTERNAL MEDICINE

## 2019-02-21 PROCEDURE — 99203 OFFICE O/P NEW LOW 30 MIN: CPT | Performed by: INTERNAL MEDICINE

## 2019-04-12 ENCOUNTER — TELEPHONE (OUTPATIENT)
Dept: CARDIOLOGY CLINIC | Facility: CLINIC | Age: 74
End: 2019-04-12

## 2019-05-06 ENCOUNTER — DOCUMENTATION (OUTPATIENT)
Dept: CARDIOLOGY CLINIC | Facility: CLINIC | Age: 74
End: 2019-05-06

## 2019-08-29 ENCOUNTER — APPOINTMENT (OUTPATIENT)
Dept: LAB | Facility: HOSPITAL | Age: 74
End: 2019-08-29
Payer: MEDICARE

## 2019-08-29 ENCOUNTER — TRANSCRIBE ORDERS (OUTPATIENT)
Dept: ADMINISTRATIVE | Facility: HOSPITAL | Age: 74
End: 2019-08-29

## 2019-08-29 DIAGNOSIS — I10 ESSENTIAL HYPERTENSION, MALIGNANT: ICD-10-CM

## 2019-08-29 DIAGNOSIS — N13.8 ENLARGED PROSTATE WITH URINARY OBSTRUCTION: ICD-10-CM

## 2019-08-29 DIAGNOSIS — E78.00 PURE HYPERCHOLESTEROLEMIA: Primary | ICD-10-CM

## 2019-08-29 DIAGNOSIS — Z79.01 LONG TERM (CURRENT) USE OF ANTICOAGULANTS: ICD-10-CM

## 2019-08-29 DIAGNOSIS — I25.10 ATHEROSCLEROSIS OF NATIVE CORONARY ARTERY OF NATIVE HEART WITHOUT ANGINA PECTORIS: ICD-10-CM

## 2019-08-29 DIAGNOSIS — R35.1 NOCTURIA: ICD-10-CM

## 2019-08-29 DIAGNOSIS — N40.1 ENLARGED PROSTATE WITH URINARY OBSTRUCTION: ICD-10-CM

## 2019-08-29 DIAGNOSIS — E78.00 PURE HYPERCHOLESTEROLEMIA: ICD-10-CM

## 2019-08-29 LAB
ALBUMIN SERPL BCP-MCNC: 3.8 G/DL (ref 3.5–5)
ALP SERPL-CCNC: 54 U/L (ref 46–116)
ALT SERPL W P-5'-P-CCNC: 38 U/L (ref 12–78)
ANION GAP SERPL CALCULATED.3IONS-SCNC: 6 MMOL/L (ref 4–13)
AST SERPL W P-5'-P-CCNC: 18 U/L (ref 5–45)
BACTERIA UR QL AUTO: ABNORMAL /HPF
BILIRUB SERPL-MCNC: 1.2 MG/DL (ref 0.2–1)
BILIRUB UR QL STRIP: NEGATIVE
BUN SERPL-MCNC: 21 MG/DL (ref 5–25)
CALCIUM SERPL-MCNC: 9.1 MG/DL (ref 8.3–10.1)
CHLORIDE SERPL-SCNC: 100 MMOL/L (ref 100–108)
CHOLEST SERPL-MCNC: 159 MG/DL (ref 50–200)
CLARITY UR: CLEAR
CO2 SERPL-SCNC: 29 MMOL/L (ref 21–32)
COLOR UR: ABNORMAL
CREAT SERPL-MCNC: 1.03 MG/DL (ref 0.6–1.3)
ERYTHROCYTE [DISTWIDTH] IN BLOOD BY AUTOMATED COUNT: 14.2 % (ref 11.6–15.1)
GFR SERPL CREATININE-BSD FRML MDRD: 70 ML/MIN/1.73SQ M
GLUCOSE P FAST SERPL-MCNC: 104 MG/DL (ref 65–99)
GLUCOSE UR STRIP-MCNC: NEGATIVE MG/DL
HCT VFR BLD AUTO: 46.3 % (ref 36.5–49.3)
HDLC SERPL-MCNC: 47 MG/DL (ref 40–60)
HGB BLD-MCNC: 15 G/DL (ref 12–17)
HGB UR QL STRIP.AUTO: NEGATIVE
KETONES UR STRIP-MCNC: NEGATIVE MG/DL
LDLC SERPL CALC-MCNC: 93 MG/DL (ref 0–100)
LEUKOCYTE ESTERASE UR QL STRIP: NEGATIVE
MCH RBC QN AUTO: 31.3 PG (ref 26.8–34.3)
MCHC RBC AUTO-ENTMCNC: 32.4 G/DL (ref 31.4–37.4)
MCV RBC AUTO: 97 FL (ref 82–98)
NITRITE UR QL STRIP: NEGATIVE
NON-SQ EPI CELLS URNS QL MICRO: ABNORMAL /HPF
NONHDLC SERPL-MCNC: 112 MG/DL
PH UR STRIP.AUTO: 6.5 [PH]
PLATELET # BLD AUTO: 171 THOUSANDS/UL (ref 149–390)
PMV BLD AUTO: 10.2 FL (ref 8.9–12.7)
POTASSIUM SERPL-SCNC: 4.5 MMOL/L (ref 3.5–5.3)
PROT SERPL-MCNC: 7.4 G/DL (ref 6.4–8.2)
PROT UR STRIP-MCNC: ABNORMAL MG/DL
RBC # BLD AUTO: 4.8 MILLION/UL (ref 3.88–5.62)
RBC #/AREA URNS AUTO: ABNORMAL /HPF
SODIUM SERPL-SCNC: 135 MMOL/L (ref 136–145)
SP GR UR STRIP.AUTO: 1.01 (ref 1–1.03)
TRIGL SERPL-MCNC: 94 MG/DL
TSH SERPL DL<=0.05 MIU/L-ACNC: 1.77 UIU/ML (ref 0.36–3.74)
UROBILINOGEN UR QL STRIP.AUTO: 0.2 E.U./DL
WBC # BLD AUTO: 7.52 THOUSAND/UL (ref 4.31–10.16)
WBC #/AREA URNS AUTO: ABNORMAL /HPF

## 2019-08-29 PROCEDURE — 36415 COLL VENOUS BLD VENIPUNCTURE: CPT

## 2019-08-29 PROCEDURE — 80061 LIPID PANEL: CPT | Performed by: INTERNAL MEDICINE

## 2019-08-29 PROCEDURE — 84443 ASSAY THYROID STIM HORMONE: CPT

## 2019-08-29 PROCEDURE — 80053 COMPREHEN METABOLIC PANEL: CPT

## 2019-08-29 PROCEDURE — 81001 URINALYSIS AUTO W/SCOPE: CPT | Performed by: INTERNAL MEDICINE

## 2019-08-29 PROCEDURE — 85027 COMPLETE CBC AUTOMATED: CPT | Performed by: INTERNAL MEDICINE

## 2019-08-29 PROCEDURE — G0103 PSA SCREENING: HCPCS

## 2019-08-30 LAB — PSA SERPL-MCNC: 1.9 NG/ML (ref 0–4)

## 2019-09-25 DIAGNOSIS — I48.92 PAROXYSMAL ATRIAL FLUTTER (HCC): ICD-10-CM

## 2019-09-25 NOTE — TELEPHONE ENCOUNTER
I renewed his Eliquis prescription but need to know that he has cardiology follow-up scheduled  Please arrange an appointment for him on a non urgent basis, if he does not currently have any follow-up scheduled

## 2019-12-18 ENCOUNTER — TELEPHONE (OUTPATIENT)
Dept: CARDIOLOGY CLINIC | Facility: CLINIC | Age: 74
End: 2019-12-18

## 2019-12-18 DIAGNOSIS — I48.92 PAROXYSMAL ATRIAL FLUTTER (HCC): ICD-10-CM

## 2019-12-18 DIAGNOSIS — E78.5 DYSLIPIDEMIA: Primary | ICD-10-CM

## 2019-12-18 RX ORDER — ATORVASTATIN CALCIUM 10 MG/1
10 TABLET, FILM COATED ORAL DAILY
Qty: 90 TABLET | Refills: 3 | Status: CANCELLED | OUTPATIENT
Start: 2019-12-18

## 2019-12-18 RX ORDER — ATORVASTATIN CALCIUM 10 MG/1
10 TABLET, FILM COATED ORAL DAILY
Qty: 90 TABLET | Refills: 3 | Status: SHIPPED | OUTPATIENT
Start: 2019-12-18

## 2019-12-18 NOTE — TELEPHONE ENCOUNTER
Requesting 90 day refills to go to [facility] starting Jan 2020 for Eliquis 5 mg and Atorvastatin 10 mg

## 2019-12-31 DIAGNOSIS — E78.5 DYSLIPIDEMIA: ICD-10-CM

## 2019-12-31 DIAGNOSIS — I48.92 PAROXYSMAL ATRIAL FLUTTER (HCC): ICD-10-CM

## 2020-01-03 DIAGNOSIS — I48.92 PAROXYSMAL ATRIAL FLUTTER (HCC): ICD-10-CM

## 2020-02-02 ENCOUNTER — HOSPITAL ENCOUNTER (INPATIENT)
Facility: HOSPITAL | Age: 75
LOS: 3 days | Discharge: HOME | DRG: 854 | End: 2020-02-05
Attending: EMERGENCY MEDICINE | Admitting: SURGERY
Payer: MEDICARE

## 2020-02-02 ENCOUNTER — APPOINTMENT (EMERGENCY)
Dept: CT IMAGING | Facility: HOSPITAL | Age: 75
DRG: 854 | End: 2020-02-02
Payer: MEDICARE

## 2020-02-02 ENCOUNTER — APPOINTMENT (EMERGENCY)
Dept: ULTRASOUND IMAGING | Facility: HOSPITAL | Age: 75
DRG: 854 | End: 2020-02-02
Payer: MEDICARE

## 2020-02-02 DIAGNOSIS — K81.0 ACUTE CHOLECYSTITIS: Primary | ICD-10-CM

## 2020-02-02 DIAGNOSIS — I49.5 TACHY-BRADY SYNDROME (HCC): ICD-10-CM

## 2020-02-02 DIAGNOSIS — I48.92 PAROXYSMAL ATRIAL FLUTTER (HCC): ICD-10-CM

## 2020-02-02 LAB
ALBUMIN SERPL BCP-MCNC: 3.2 G/DL (ref 3.5–5)
ALBUMIN SERPL BCP-MCNC: 3.6 G/DL (ref 3.5–5)
ALP SERPL-CCNC: 56 U/L (ref 46–116)
ALP SERPL-CCNC: 59 U/L (ref 46–116)
ALT SERPL W P-5'-P-CCNC: 37 U/L (ref 12–78)
ALT SERPL W P-5'-P-CCNC: 39 U/L (ref 12–78)
ANION GAP SERPL CALCULATED.3IONS-SCNC: 8 MMOL/L (ref 4–13)
APTT PPP: 32 SECONDS (ref 23–37)
AST SERPL W P-5'-P-CCNC: 23 U/L (ref 5–45)
AST SERPL W P-5'-P-CCNC: 25 U/L (ref 5–45)
ATRIAL RATE: 111 BPM
BACTERIA UR QL AUTO: ABNORMAL /HPF
BASOPHILS # BLD AUTO: 0.03 THOUSANDS/ΜL (ref 0–0.1)
BASOPHILS NFR BLD AUTO: 0 % (ref 0–1)
BILIRUB DIRECT SERPL-MCNC: 0.53 MG/DL (ref 0–0.2)
BILIRUB SERPL-MCNC: 1.88 MG/DL (ref 0.2–1)
BILIRUB SERPL-MCNC: 2.31 MG/DL (ref 0.2–1)
BILIRUB UR QL STRIP: NEGATIVE
BUN SERPL-MCNC: 28 MG/DL (ref 5–25)
CALCIUM SERPL-MCNC: 9.2 MG/DL (ref 8.3–10.1)
CHLORIDE SERPL-SCNC: 101 MMOL/L (ref 100–108)
CLARITY UR: CLEAR
CO2 SERPL-SCNC: 26 MMOL/L (ref 21–32)
COLOR UR: YELLOW
CREAT SERPL-MCNC: 1.27 MG/DL (ref 0.6–1.3)
EOSINOPHIL # BLD AUTO: 0.03 THOUSAND/ΜL (ref 0–0.61)
EOSINOPHIL NFR BLD AUTO: 0 % (ref 0–6)
ERYTHROCYTE [DISTWIDTH] IN BLOOD BY AUTOMATED COUNT: 14.5 % (ref 11.6–15.1)
GFR SERPL CREATININE-BSD FRML MDRD: 54 ML/MIN/1.73SQ M
GLUCOSE SERPL-MCNC: 138 MG/DL (ref 65–140)
GLUCOSE UR STRIP-MCNC: NEGATIVE MG/DL
HCT VFR BLD AUTO: 43.2 % (ref 36.5–49.3)
HGB BLD-MCNC: 14.7 G/DL (ref 12–17)
HGB UR QL STRIP.AUTO: NEGATIVE
IMM GRANULOCYTES # BLD AUTO: 0.17 THOUSAND/UL (ref 0–0.2)
IMM GRANULOCYTES NFR BLD AUTO: 1 % (ref 0–2)
INR PPP: 1.45 (ref 0.84–1.19)
KETONES UR STRIP-MCNC: ABNORMAL MG/DL
LEUKOCYTE ESTERASE UR QL STRIP: NEGATIVE
LIPASE SERPL-CCNC: 72 U/L (ref 73–393)
LYMPHOCYTES # BLD AUTO: 0.59 THOUSANDS/ΜL (ref 0.6–4.47)
LYMPHOCYTES NFR BLD AUTO: 4 % (ref 14–44)
MCH RBC QN AUTO: 32.5 PG (ref 26.8–34.3)
MCHC RBC AUTO-ENTMCNC: 34 G/DL (ref 31.4–37.4)
MCV RBC AUTO: 96 FL (ref 82–98)
MONOCYTES # BLD AUTO: 1.25 THOUSAND/ΜL (ref 0.17–1.22)
MONOCYTES NFR BLD AUTO: 8 % (ref 4–12)
NEUTROPHILS # BLD AUTO: 14.7 THOUSANDS/ΜL (ref 1.85–7.62)
NEUTS SEG NFR BLD AUTO: 87 % (ref 43–75)
NITRITE UR QL STRIP: NEGATIVE
NON-SQ EPI CELLS URNS QL MICRO: ABNORMAL /HPF
NRBC BLD AUTO-RTO: 0 /100 WBCS
P AXIS: 234 DEGREES
PH UR STRIP.AUTO: 5.5 [PH] (ref 4.5–8)
PLATELET # BLD AUTO: 151 THOUSANDS/UL (ref 149–390)
PMV BLD AUTO: 9.9 FL (ref 8.9–12.7)
POTASSIUM SERPL-SCNC: 4.3 MMOL/L (ref 3.5–5.3)
PR INTERVAL: 128 MS
PROT SERPL-MCNC: 6.7 G/DL (ref 6.4–8.2)
PROT SERPL-MCNC: 7.4 G/DL (ref 6.4–8.2)
PROT UR STRIP-MCNC: ABNORMAL MG/DL
PROTHROMBIN TIME: 16.9 SECONDS (ref 11.6–14.5)
QRS AXIS: -23 DEGREES
QRSD INTERVAL: 82 MS
QT INTERVAL: 298 MS
QTC INTERVAL: 405 MS
RBC # BLD AUTO: 4.52 MILLION/UL (ref 3.88–5.62)
RBC #/AREA URNS AUTO: ABNORMAL /HPF
SODIUM SERPL-SCNC: 135 MMOL/L (ref 136–145)
SP GR UR STRIP.AUTO: 1.02 (ref 1–1.03)
T WAVE AXIS: 88 DEGREES
TROPONIN I SERPL-MCNC: 0.02 NG/ML
UROBILINOGEN UR QL STRIP.AUTO: 0.2 E.U./DL
VENTRICULAR RATE: 111 BPM
WBC # BLD AUTO: 16.77 THOUSAND/UL (ref 4.31–10.16)
WBC #/AREA URNS AUTO: ABNORMAL /HPF

## 2020-02-02 PROCEDURE — 99285 EMERGENCY DEPT VISIT HI MDM: CPT

## 2020-02-02 PROCEDURE — 85610 PROTHROMBIN TIME: CPT | Performed by: EMERGENCY MEDICINE

## 2020-02-02 PROCEDURE — 96361 HYDRATE IV INFUSION ADD-ON: CPT

## 2020-02-02 PROCEDURE — 36415 COLL VENOUS BLD VENIPUNCTURE: CPT | Performed by: EMERGENCY MEDICINE

## 2020-02-02 PROCEDURE — 85730 THROMBOPLASTIN TIME PARTIAL: CPT | Performed by: EMERGENCY MEDICINE

## 2020-02-02 PROCEDURE — 76705 ECHO EXAM OF ABDOMEN: CPT

## 2020-02-02 PROCEDURE — 83690 ASSAY OF LIPASE: CPT | Performed by: EMERGENCY MEDICINE

## 2020-02-02 PROCEDURE — 93010 ELECTROCARDIOGRAM REPORT: CPT | Performed by: INTERNAL MEDICINE

## 2020-02-02 PROCEDURE — 84484 ASSAY OF TROPONIN QUANT: CPT | Performed by: EMERGENCY MEDICINE

## 2020-02-02 PROCEDURE — 80053 COMPREHEN METABOLIC PANEL: CPT | Performed by: EMERGENCY MEDICINE

## 2020-02-02 PROCEDURE — 93005 ELECTROCARDIOGRAM TRACING: CPT

## 2020-02-02 PROCEDURE — 96375 TX/PRO/DX INJ NEW DRUG ADDON: CPT

## 2020-02-02 PROCEDURE — 80076 HEPATIC FUNCTION PANEL: CPT | Performed by: SURGERY

## 2020-02-02 PROCEDURE — 99285 EMERGENCY DEPT VISIT HI MDM: CPT | Performed by: EMERGENCY MEDICINE

## 2020-02-02 PROCEDURE — 81001 URINALYSIS AUTO W/SCOPE: CPT

## 2020-02-02 PROCEDURE — 74177 CT ABD & PELVIS W/CONTRAST: CPT

## 2020-02-02 PROCEDURE — 85025 COMPLETE CBC W/AUTO DIFF WBC: CPT | Performed by: EMERGENCY MEDICINE

## 2020-02-02 PROCEDURE — 1123F ACP DISCUSS/DSCN MKR DOCD: CPT | Performed by: INTERNAL MEDICINE

## 2020-02-02 PROCEDURE — 96374 THER/PROPH/DIAG INJ IV PUSH: CPT

## 2020-02-02 RX ORDER — ATORVASTATIN CALCIUM 10 MG/1
10 TABLET, FILM COATED ORAL DAILY
Status: DISCONTINUED | OUTPATIENT
Start: 2020-02-03 | End: 2020-02-05 | Stop reason: HOSPADM

## 2020-02-02 RX ORDER — ONDANSETRON 2 MG/ML
4 INJECTION INTRAMUSCULAR; INTRAVENOUS EVERY 6 HOURS PRN
Status: DISCONTINUED | OUTPATIENT
Start: 2020-02-02 | End: 2020-02-05 | Stop reason: HOSPADM

## 2020-02-02 RX ORDER — HYDROMORPHONE HCL/PF 1 MG/ML
0.5 SYRINGE (ML) INJECTION
Status: DISCONTINUED | OUTPATIENT
Start: 2020-02-02 | End: 2020-02-03

## 2020-02-02 RX ORDER — METRONIDAZOLE 500 MG/1
500 TABLET ORAL EVERY 8 HOURS SCHEDULED
Status: DISCONTINUED | OUTPATIENT
Start: 2020-02-02 | End: 2020-02-05 | Stop reason: HOSPADM

## 2020-02-02 RX ORDER — HYDROMORPHONE HCL/PF 1 MG/ML
0.2 SYRINGE (ML) INJECTION
Status: DISCONTINUED | OUTPATIENT
Start: 2020-02-02 | End: 2020-02-03

## 2020-02-02 RX ORDER — ONDANSETRON 2 MG/ML
4 INJECTION INTRAMUSCULAR; INTRAVENOUS ONCE
Status: COMPLETED | OUTPATIENT
Start: 2020-02-02 | End: 2020-02-02

## 2020-02-02 RX ORDER — LISINOPRIL 20 MG/1
20 TABLET ORAL DAILY
Status: DISCONTINUED | OUTPATIENT
Start: 2020-02-03 | End: 2020-02-05 | Stop reason: HOSPADM

## 2020-02-02 RX ORDER — SODIUM CHLORIDE, SODIUM LACTATE, POTASSIUM CHLORIDE, CALCIUM CHLORIDE 600; 310; 30; 20 MG/100ML; MG/100ML; MG/100ML; MG/100ML
125 INJECTION, SOLUTION INTRAVENOUS CONTINUOUS
Status: DISCONTINUED | OUTPATIENT
Start: 2020-02-02 | End: 2020-02-04

## 2020-02-02 RX ORDER — HEPARIN SODIUM 10000 [USP'U]/100ML
3-20 INJECTION, SOLUTION INTRAVENOUS
Status: DISCONTINUED | OUTPATIENT
Start: 2020-02-02 | End: 2020-02-03

## 2020-02-02 RX ORDER — CEFAZOLIN SODIUM 2 G/50ML
2000 SOLUTION INTRAVENOUS EVERY 8 HOURS
Status: DISCONTINUED | OUTPATIENT
Start: 2020-02-02 | End: 2020-02-05 | Stop reason: HOSPADM

## 2020-02-02 RX ORDER — TAMSULOSIN HYDROCHLORIDE 0.4 MG/1
0.4 CAPSULE ORAL
Status: DISCONTINUED | OUTPATIENT
Start: 2020-02-03 | End: 2020-02-05 | Stop reason: HOSPADM

## 2020-02-02 RX ADMIN — HYDROMORPHONE HYDROCHLORIDE 0.2 MG: 1 INJECTION, SOLUTION INTRAMUSCULAR; INTRAVENOUS; SUBCUTANEOUS at 21:51

## 2020-02-02 RX ADMIN — IOHEXOL 100 ML: 350 INJECTION, SOLUTION INTRAVENOUS at 16:33

## 2020-02-02 RX ADMIN — HEPARIN SODIUM AND DEXTROSE 11.1 UNITS/KG/HR: 10000; 5 INJECTION INTRAVENOUS at 21:52

## 2020-02-02 RX ADMIN — SODIUM CHLORIDE 1000 ML: 0.9 INJECTION, SOLUTION INTRAVENOUS at 16:14

## 2020-02-02 RX ADMIN — SODIUM CHLORIDE, SODIUM LACTATE, POTASSIUM CHLORIDE, AND CALCIUM CHLORIDE 125 ML/HR: .6; .31; .03; .02 INJECTION, SOLUTION INTRAVENOUS at 21:22

## 2020-02-02 RX ADMIN — MORPHINE SULFATE 2 MG: 2 INJECTION, SOLUTION INTRAMUSCULAR; INTRAVENOUS at 15:28

## 2020-02-02 RX ADMIN — CEFAZOLIN SODIUM 2000 MG: 2 SOLUTION INTRAVENOUS at 21:21

## 2020-02-02 RX ADMIN — METRONIDAZOLE 500 MG: 500 TABLET ORAL at 21:35

## 2020-02-02 RX ADMIN — ONDANSETRON 4 MG: 2 INJECTION INTRAMUSCULAR; INTRAVENOUS at 15:28

## 2020-02-02 NOTE — ED PROVIDER NOTES
History  Chief Complaint   Patient presents with    Abdominal Pain     abd discomfort , pt was on cruise, seen and given blood work and IV's , pt has history of diverticulitis which feels the same     49-year-old gentleman comes in for evaluation of abdominal pain  Began with pain at approximately 0430 in the morning yesterday  Patient was on a cruise ship that time he went to see the ship doctor who did blood work who saw that he had [place] [phone] and covered with Cipro and Flagyl for presumed diverticulitis  Patient denies any fever chills chest pain shortness of breath some nausea no vomiting  Has had diverticulitis before and does states this feels somewhat similar, however he does note that the this did start somewhat as epigastric pain which is different from his typical diverticulitis presentation  History provided by:  Patient   used: No    Abdominal Pain   Pain location:  LLQ and suprapubic  Pain quality: gnawing and pressure    Pain radiates to:  Does not radiate  Pain severity:  Moderate  Onset quality:  Sudden  Duration:  1 day  Timing:  Constant  Progression:  Worsening  Chronicity:  New  Context: not alcohol use, not recent illness and not trauma    Associated symptoms: nausea    Associated symptoms: no anorexia, no chest pain, no cough, no diarrhea, no fever, no hematuria and no vomiting    Risk factors: no alcohol abuse, no NSAID use and not pregnant        Prior to Admission Medications   Prescriptions Last Dose Informant Patient Reported? Taking?    Acai 500 MG CAPS  Self Yes No   Sig: Take by mouth   Misc Natural Products (TART CHERRY ADVANCED PO)  Self Yes No   Sig: Take by mouth   Multiple Vitamin (DAILY VALUE MULTIVITAMIN) TABS  Self Yes No   Sig: Take 1 tablet by mouth daily   Probiotic Product (PROBIOTIC-10) CAPS  Self Yes No   Sig: Take by mouth   apixaban (ELIQUIS) 5 mg   No No   Sig: Take 1 tablet (5 mg total) by mouth 2 (two) times a day atorvastatin (LIPITOR) 10 mg tablet   No No   Sig: Take 1 tablet (10 mg total) by mouth daily   lisinopril (ZESTRIL) 20 mg tablet  Self Yes No   Sig: Take 1 tablet by mouth daily   tamsulosin (FLOMAX) 0 4 mg  Self Yes No   Sig: Take 0 4 mg by mouth      Facility-Administered Medications: None       Past Medical History:   Diagnosis Date    [facility]    [facility]cardia [facility]09[facility]2018    [facility]    Dyslipidemia     Essential hypertension     Impaired fasting glucose     Premature atrial contractions     Sinus bradycardia-tachycardia syndrome ([place] [address] )        History reviewed  No pertinent surgical history  Family History   Problem Relation Age of Onset    Microcephaly Father     Heart attack Father      I have reviewed and agree with the history as documented  Social History     Tobacco Use    Smoking status: Never Smoker    Smokeless tobacco: Never Used   Substance Use Topics    Alcohol use: Yes     Alcohol/week: 2 0 standard drinks     Types: 2 Glasses of wine per week     Comment: a day     Drug use: No        Review of Systems   Constitutional: Negative for activity change, appetite change and fever  HENT: Negative for congestion and facial swelling  Eyes: Negative for discharge and redness  Respiratory: Negative for cough and wheezing  Cardiovascular: Negative for chest pain and leg swelling  Gastrointestinal: Positive for abdominal pain and nausea  Negative for abdominal distention, anorexia, blood in stool, diarrhea and vomiting  Endocrine: Negative for cold intolerance and polydipsia  Genitourinary: Negative for difficulty urinating and hematuria  Musculoskeletal: Negative for arthralgias and gait problem  Skin: Negative for color change and rash  Allergic/Immunologic: Negative for food allergies and immunocompromised state  Neurological: Negative for dizziness, seizures and headaches     Hematological: Negative for adenopathy  Does not bruise/bleed easily  Psychiatric/Behavioral: Negative for agitation, confusion and decreased concentration  All other systems reviewed and are negative  Physical Exam  Physical Exam   Constitutional: He is oriented to person, place, and time  He appears well-developed and well-nourished  Non-toxic appearance  He appears distressed  HENT:   Head: Normocephalic and atraumatic  Right Ear: Tympanic membrane normal    Left Ear: Tympanic membrane normal    Nose: Nose normal    Mouth/Throat: Oropharynx is clear and moist    Eyes: Pupils are equal, round, and reactive to light  Conjunctivae, EOM and lids are normal    Neck: Trachea normal and normal range of motion  Neck supple  No JVD present  Carotid bruit is not present  Cardiovascular: Regular rhythm, normal heart sounds and intact distal pulses  No extrasystoles are present  Tachycardia present  Pulmonary/Chest: Effort normal  He has no decreased breath sounds  He has no wheezes  He has no rhonchi  He has no rales  He exhibits no tenderness and no deformity  Abdominal: Soft  Bowel sounds are normal  There is generalized tenderness  There is no rebound, no guarding and no CVA tenderness  Musculoskeletal:        Right shoulder: He exhibits normal range of motion, no tenderness, no swelling and no deformity  Cervical back: Normal  He exhibits normal range of motion, no tenderness, no bony tenderness and no deformity  Lymphadenopathy:     He has no cervical adenopathy  He has no axillary adenopathy  Neurological: He is alert and oriented to person, place, and time  He has normal strength and normal reflexes  No cranial nerve deficit or sensory deficit  He displays a negative Romberg sign  Skin: Skin is warm and dry  Psychiatric: He has a normal mood and affect   His speech is normal and behavior is normal  Judgment and thought content normal  Cognition and memory are normal    Nursing note and vitals reviewed        Vital Signs  ED Triage Vitals   Temperature Pulse Respirations Blood Pressure SpO2   02/02/20 1427 02/02/20 1424 02/02/20 1424 02/02/20 1424 02/02/20 1424   98 2 °F (36 8 °C) (!) 114 20 152/88 95 %      Temp Source Heart Rate Source Patient Position - Orthostatic VS BP Location FiO2 (%)   02/02/20 1427 02/02/20 1600 02/02/20 1600 02/02/20 1600 --   Oral Monitor Lying Right arm       Pain Score       02/02/20 1424       3           Vitals:    02/02/20 1424 02/02/20 1600 02/02/20 1715   BP: 152/88 124/79 153/90   Pulse: (!) 114 (!) 108 (!) 118   Patient Position - Orthostatic VS:  Lying Lying         Visual Acuity      ED Medications  Medications   ceFAZolin (ANCEF) IVPB (premix) 2,000 mg (has no administration in time range)   metroNIDAZOLE (FLAGYL) tablet 500 mg (has no administration in time range)   lactated ringers infusion (has no administration in time range)   ondansetron (ZOFRAN) injection 4 mg (has no administration in time range)   enoxaparin (LOVENOX) subcutaneous injection 40 mg (has no administration in time range)   HYDROmorphone (DILAUDID) injection 0 2 mg (has no administration in time range)   HYDROmorphone (DILAUDID) injection 0 5 mg (has no administration in time range)   ondansetron (ZOFRAN) injection 4 mg (4 mg Intravenous Given 2/2/20 1528)   morphine injection 2 mg (2 mg Intravenous Given 2/2/20 1528)   sodium chloride 0 9 % bolus 1,000 mL (0 mL Intravenous Stopped 2/2/20 1951)   iohexol (OMNIPAQUE) 350 MG/ML injection (MULTI-DOSE) 100 mL (100 mL Intravenous Given 2/2/20 1633)       Diagnostic Studies  Results Reviewed     Procedure Component Value Units Date/Time    Protime-INR [[id number]]     Lab Status:  No result Specimen:  Blood     APTT [[id number]]     Lab Status:  No result Specimen:  Blood     Platelet count [[id number]]     Lab Status:  No result Specimen:  Blood     Urine Microscopic [[id number]]  (Abnormal) Collected:  02/02/20 6001    Lab Status:  Final result Specimen:  Urine, Clean Catch Updated:  02/02/20 1938     RBC, UA None Seen /hpf      WBC, UA 0-1 /hpf      Epithelial Cells None Seen /hpf      Bacteria, UA None Seen /hpf     Urine Macroscopic, POC [[id number]]  (Abnormal) Collected:  02/02/20 1908    Lab Status:  Final result Specimen:  Urine Updated:  02/02/20 1904     Color, UA Yellow     Clarity, UA Clear     pH, UA 5 5     Leukocytes, UA Negative     Nitrite, UA Negative     Protein, UA 30 (1+) mg/dl      Glucose, UA Negative mg/dl      Ketones, UA Trace mg/dl      Urobilinogen, UA 0 2 E U /dl      Bilirubin, UA Negative     Blood, UA Negative     Specific Gravity, UA 1 025    Narrative:       CLINITEK RESULT    Comprehensive metabolic panel [[id number]]  (Abnormal) Collected:  02/02/20 1521    Lab Status:  Final result Specimen:  Blood from Arm, Left Updated:  02/02/20 1554     Sodium 135 mmol/L      Potassium 4 3 mmol/L      Chloride 101 mmol/L      CO2 26 mmol/L      ANION GAP 8 mmol/L      BUN 28 mg/dL      Creatinine 1 27 mg/dL      Glucose 138 mg/dL      Calcium 9 2 mg/dL      AST 25 U/L      ALT 39 U/L      Alkaline Phosphatase 59 U/L      Total Protein 7 4 g/dL      Albumin 3 6 g/dL      Total Bilirubin 2 31 mg/dL      eGFR 54 ml/min/1 73sq m     Narrative:       [place] guidelines for Chronic Kidney Disease (CKD):     Stage 1 with normal or high GFR (GFR > 90 mL/min/1 73 square meters)    Stage 2 Mild CKD (GFR = 60-89 mL/min/1 73 square meters)    Stage 3A Moderate CKD (GFR = 45-59 mL/min/1 73 square meters)    Stage 3B Moderate CKD (GFR = 30-44 mL/min/1 73 square meters)    Stage 4 Severe CKD (GFR = 15-29 mL/min/1 73 square meters)    Stage 5 End Stage CKD (GFR <15 mL/min/1 73 square meters)  Note: GFR calculation is accurate only with a steady state creatinine    Lipase [[id number]]  (Abnormal) Collected:  02/02/20 1521    Lab Status:  Final result Specimen:  Blood from Arm, Left Updated:  02/02/20 1554     Lipase 72 u/L     Troponin I [[id number]]  (Normal) Collected:  02/02/20 1521    Lab Status:  Final result Specimen:  Blood from Arm, Left Updated:  02/02/20 1550     Troponin I 0 02 ng/mL     CBC and differential [[id number]]  (Abnormal) Collected:  02/02/20 1521    Lab Status:  Final result Specimen:  Blood from Arm, Left Updated:  02/02/20 1540     WBC 16 77 Thousand/uL      RBC 4 52 Million/uL      Hemoglobin 14 7 g/dL      Hematocrit 43 2 %      MCV 96 fL      MCH 32 5 pg      MCHC 34 0 g/dL      RDW 14 5 %      MPV 9 9 fL      Platelets 389 Thousands/uL      nRBC 0 /100 WBCs      Neutrophils Relative 87 %      Immat GRANS % 1 %      Lymphocytes Relative 4 %      Monocytes Relative 8 %      Eosinophils Relative 0 %      Basophils Relative 0 %      Neutrophils Absolute 14 70 Thousands/µL      Immature Grans Absolute 0 17 Thousand/uL      Lymphocytes Absolute 0 59 Thousands/µL      Monocytes Absolute 1 25 Thousand/µL      Eosinophils Absolute 0 03 Thousand/µL      Basophils Absolute 0 03 Thousands/µL                  US right upper quadrant   Final Result by Celine Storm MD (02/02 1926)         1  Gallstones with mild amount of pericholecystic fluid and sonographic Simpson sign with slightly distended gallbladder  Findings are consistent with acute cholecystitis as noted on the CT exam from earlier today  2  Hepatomegaly with diffuse hepatic steatosis  3  Right renal lesion as described above  The study was marked in [facility] for immediate notification        Workstation performed: [id number]         CT abdomen pelvis with contrast   Final Result by Marcia Garrison MD (02/02 1740)      Distended gallbladder with stone in the neck of the gallbladder and surrounding pericholecystic infiltration suggest acute cholecystitis      Mild infiltration seen in the right upper quadrant adjacent to the hepatic flexure likely due to tracking of infiltration from the adjacent gallbladder inflammation      No fluid collection Common bile duct measures 8 mm and at the upper limits      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  On the previous study of April 2015 this was measuring about 2 cm      MID right renal lesion with surgical clips, macroscopic fat, stable for period of about 5 years at the level of the previous cryoablation   The study was marked in [facility] for immediate notification  Workstation performed: [id number]         MRI inpatient order    (Results Pending)              Procedures  Procedures         ED Course                               MDM  Number of Diagnoses or Management Options  Acute cholecystitis: new and requires workup     Amount and/or Complexity of Data Reviewed  Clinical lab tests: ordered and reviewed  Tests in the radiology section of CPT®: ordered and reviewed  Tests in the medicine section of CPT®: reviewed and ordered  Review and summarize past medical records: yes  Discuss the patient with other providers: yes  Independent visualization of images, tracings, or specimens: yes    Patient Progress  Patient progress: stable        Disposition  Final diagnoses:   Acute cholecystitis     Time reflects when diagnosis was documented in both MDM as applicable and the Disposition within this note     Time User Action Codes Description Comment    2/2/2020  7:41 PM Chau DORANTES Add [K81 0] Acute cholecystitis     2/2/2020  7:42 PM Michael CAMPBELL Add [I48 92] Paroxysmal atrial flutter [facility])       ED Disposition     ED Disposition Condition Date/Time Comment    Admit Stable Sun Feb 2, 2020  7:41 PM Case was discussed with Dr Can Lozano and the patient's admission status was agreed to be Admission Status: inpatient status to the service of Dr Zina Marsh   Follow-up Information    None         Patient's Medications   Discharge Prescriptions    No medications on file     No discharge procedures on file      ED Provider  Electronically Signed by           Jay Castaneda [address],   02/02/20 1952

## 2020-02-03 ENCOUNTER — APPOINTMENT (INPATIENT)
Dept: MRI IMAGING | Facility: HOSPITAL | Age: 75
DRG: 854 | End: 2020-02-03
Payer: MEDICARE

## 2020-02-03 ENCOUNTER — ANESTHESIA EVENT (INPATIENT)
Dept: PERIOP | Facility: HOSPITAL | Age: 75
DRG: 854 | End: 2020-02-03
Payer: MEDICARE

## 2020-02-03 ENCOUNTER — ANESTHESIA (INPATIENT)
Dept: PERIOP | Facility: HOSPITAL | Age: 75
DRG: 854 | End: 2020-02-03
Payer: MEDICARE

## 2020-02-03 PROBLEM — I10 HTN (HYPERTENSION): Status: ACTIVE | Noted: 2020-02-03

## 2020-02-03 PROBLEM — I49.5 TACHY-BRADY SYNDROME (HCC): Status: ACTIVE | Noted: 2020-02-03

## 2020-02-03 PROBLEM — K81.0 ACUTE CHOLECYSTITIS: Status: ACTIVE | Noted: 2020-02-02

## 2020-02-03 LAB
ANION GAP SERPL CALCULATED.3IONS-SCNC: 10 MMOL/L (ref 4–13)
APTT PPP: 44 SECONDS (ref 23–37)
APTT PPP: 45 SECONDS (ref 23–37)
BUN SERPL-MCNC: 19 MG/DL (ref 5–25)
CALCIUM SERPL-MCNC: 8.9 MG/DL (ref 8.3–10.1)
CHLORIDE SERPL-SCNC: 103 MMOL/L (ref 100–108)
CO2 SERPL-SCNC: 23 MMOL/L (ref 21–32)
CREAT SERPL-MCNC: 1.08 MG/DL (ref 0.6–1.3)
ERYTHROCYTE [DISTWIDTH] IN BLOOD BY AUTOMATED COUNT: 14.6 % (ref 11.6–15.1)
GFR SERPL CREATININE-BSD FRML MDRD: 66 ML/MIN/1.73SQ M
GLUCOSE SERPL-MCNC: 126 MG/DL (ref 65–140)
HCT VFR BLD AUTO: 41 % (ref 36.5–49.3)
HGB BLD-MCNC: 13.4 G/DL (ref 12–17)
MCH RBC QN AUTO: 31.8 PG (ref 26.8–34.3)
MCHC RBC AUTO-ENTMCNC: 32.7 G/DL (ref 31.4–37.4)
MCV RBC AUTO: 97 FL (ref 82–98)
PLATELET # BLD AUTO: 123 THOUSANDS/UL (ref 149–390)
PMV BLD AUTO: 10 FL (ref 8.9–12.7)
POTASSIUM SERPL-SCNC: 4 MMOL/L (ref 3.5–5.3)
RBC # BLD AUTO: 4.21 MILLION/UL (ref 3.88–5.62)
SODIUM SERPL-SCNC: 136 MMOL/L (ref 136–145)
WBC # BLD AUTO: 16.79 THOUSAND/UL (ref 4.31–10.16)

## 2020-02-03 PROCEDURE — 85730 THROMBOPLASTIN TIME PARTIAL: CPT | Performed by: SURGERY

## 2020-02-03 PROCEDURE — 94762 N-INVAS EAR/PLS OXIMTRY CONT: CPT

## 2020-02-03 PROCEDURE — 0W9J40Z DRAINAGE OF PELVIC CAVITY WITH DRAINAGE DEVICE, PERCUTANEOUS ENDOSCOPIC APPROACH: ICD-10-PCS | Performed by: SURGERY

## 2020-02-03 PROCEDURE — 47562 LAPAROSCOPIC CHOLECYSTECTOMY: CPT | Performed by: SURGERY

## 2020-02-03 PROCEDURE — 85027 COMPLETE CBC AUTOMATED: CPT | Performed by: SURGERY

## 2020-02-03 PROCEDURE — 88304 TISSUE EXAM BY PATHOLOGIST: CPT | Performed by: PATHOLOGY

## 2020-02-03 PROCEDURE — 94760 N-INVAS EAR/PLS OXIMETRY 1: CPT

## 2020-02-03 PROCEDURE — 76377 3D RENDER W/INTRP POSTPROCES: CPT

## 2020-02-03 PROCEDURE — 99222 1ST HOSP IP/OBS MODERATE 55: CPT | Performed by: FAMILY MEDICINE

## 2020-02-03 PROCEDURE — 80048 BASIC METABOLIC PNL TOTAL CA: CPT | Performed by: SURGERY

## 2020-02-03 PROCEDURE — 0FT44ZZ RESECTION OF GALLBLADDER, PERCUTANEOUS ENDOSCOPIC APPROACH: ICD-10-PCS | Performed by: SURGERY

## 2020-02-03 PROCEDURE — 74181 MRI ABDOMEN W/O CONTRAST: CPT

## 2020-02-03 PROCEDURE — 99221 1ST HOSP IP/OBS SF/LOW 40: CPT | Performed by: SURGERY

## 2020-02-03 PROCEDURE — 93005 ELECTROCARDIOGRAM TRACING: CPT

## 2020-02-03 RX ORDER — DEXAMETHASONE SODIUM PHOSPHATE 10 MG/ML
INJECTION, SOLUTION INTRAMUSCULAR; INTRAVENOUS AS NEEDED
Status: DISCONTINUED | OUTPATIENT
Start: 2020-02-03 | End: 2020-02-03 | Stop reason: SURG

## 2020-02-03 RX ORDER — LIDOCAINE HYDROCHLORIDE 10 MG/ML
INJECTION, SOLUTION EPIDURAL; INFILTRATION; INTRACAUDAL; PERINEURAL AS NEEDED
Status: DISCONTINUED | OUTPATIENT
Start: 2020-02-03 | End: 2020-02-03 | Stop reason: SURG

## 2020-02-03 RX ORDER — ROCURONIUM BROMIDE 10 MG/ML
INJECTION, SOLUTION INTRAVENOUS AS NEEDED
Status: DISCONTINUED | OUTPATIENT
Start: 2020-02-03 | End: 2020-02-03 | Stop reason: SURG

## 2020-02-03 RX ORDER — ONDANSETRON 2 MG/ML
INJECTION INTRAMUSCULAR; INTRAVENOUS AS NEEDED
Status: DISCONTINUED | OUTPATIENT
Start: 2020-02-03 | End: 2020-02-03 | Stop reason: SURG

## 2020-02-03 RX ORDER — ALBUTEROL SULFATE 90 UG/1
AEROSOL, METERED RESPIRATORY (INHALATION) AS NEEDED
Status: DISCONTINUED | OUTPATIENT
Start: 2020-02-03 | End: 2020-02-03 | Stop reason: SURG

## 2020-02-03 RX ORDER — BUPIVACAINE HYDROCHLORIDE 2.5 MG/ML
INJECTION, SOLUTION EPIDURAL; INFILTRATION; INTRACAUDAL AS NEEDED
Status: DISCONTINUED | OUTPATIENT
Start: 2020-02-03 | End: 2020-02-03 | Stop reason: HOSPADM

## 2020-02-03 RX ORDER — MEPERIDINE HYDROCHLORIDE 25 MG/ML
25 INJECTION INTRAMUSCULAR; INTRAVENOUS; SUBCUTANEOUS AS NEEDED
Status: DISCONTINUED | OUTPATIENT
Start: 2020-02-03 | End: 2020-02-03 | Stop reason: HOSPADM

## 2020-02-03 RX ORDER — MAGNESIUM HYDROXIDE 1200 MG/15ML
LIQUID ORAL AS NEEDED
Status: DISCONTINUED | OUTPATIENT
Start: 2020-02-03 | End: 2020-02-03 | Stop reason: HOSPADM

## 2020-02-03 RX ORDER — HYDROMORPHONE HCL/PF 1 MG/ML
0.2 SYRINGE (ML) INJECTION
Status: DISCONTINUED | OUTPATIENT
Start: 2020-02-03 | End: 2020-02-05 | Stop reason: HOSPADM

## 2020-02-03 RX ORDER — SODIUM CHLORIDE 9 MG/ML
INJECTION, SOLUTION INTRAVENOUS AS NEEDED
Status: DISCONTINUED | OUTPATIENT
Start: 2020-02-03 | End: 2020-02-03 | Stop reason: HOSPADM

## 2020-02-03 RX ORDER — NEOSTIGMINE METHYLSULFATE 1 MG/ML
INJECTION INTRAVENOUS AS NEEDED
Status: DISCONTINUED | OUTPATIENT
Start: 2020-02-03 | End: 2020-02-03 | Stop reason: SURG

## 2020-02-03 RX ORDER — FENTANYL CITRATE/PF 50 MCG/ML
25 SYRINGE (ML) INJECTION
Status: DISCONTINUED | OUTPATIENT
Start: 2020-02-03 | End: 2020-02-03 | Stop reason: HOSPADM

## 2020-02-03 RX ORDER — SUCCINYLCHOLINE/SOD CL,ISO/PF 100 MG/5ML
SYRINGE (ML) INTRAVENOUS AS NEEDED
Status: DISCONTINUED | OUTPATIENT
Start: 2020-02-03 | End: 2020-02-03 | Stop reason: SURG

## 2020-02-03 RX ORDER — METOPROLOL TARTRATE 5 MG/5ML
5 INJECTION INTRAVENOUS EVERY 6 HOURS PRN
Status: DISCONTINUED | OUTPATIENT
Start: 2020-02-03 | End: 2020-02-05 | Stop reason: HOSPADM

## 2020-02-03 RX ORDER — HYDROMORPHONE HCL/PF 1 MG/ML
0.5 SYRINGE (ML) INJECTION
Status: DISCONTINUED | OUTPATIENT
Start: 2020-02-03 | End: 2020-02-03 | Stop reason: HOSPADM

## 2020-02-03 RX ORDER — GLYCOPYRROLATE 0.2 MG/ML
INJECTION INTRAMUSCULAR; INTRAVENOUS AS NEEDED
Status: DISCONTINUED | OUTPATIENT
Start: 2020-02-03 | End: 2020-02-03 | Stop reason: SURG

## 2020-02-03 RX ORDER — OXYCODONE HYDROCHLORIDE AND ACETAMINOPHEN 5; 325 MG/1; MG/1
2 TABLET ORAL EVERY 4 HOURS PRN
Status: DISCONTINUED | OUTPATIENT
Start: 2020-02-03 | End: 2020-02-05 | Stop reason: HOSPADM

## 2020-02-03 RX ORDER — FENTANYL CITRATE 50 UG/ML
INJECTION, SOLUTION INTRAMUSCULAR; INTRAVENOUS AS NEEDED
Status: DISCONTINUED | OUTPATIENT
Start: 2020-02-03 | End: 2020-02-03 | Stop reason: SURG

## 2020-02-03 RX ORDER — PROPOFOL 10 MG/ML
INJECTION, EMULSION INTRAVENOUS AS NEEDED
Status: DISCONTINUED | OUTPATIENT
Start: 2020-02-03 | End: 2020-02-03 | Stop reason: SURG

## 2020-02-03 RX ORDER — OXYCODONE HYDROCHLORIDE AND ACETAMINOPHEN 5; 325 MG/1; MG/1
1 TABLET ORAL EVERY 4 HOURS PRN
Status: DISCONTINUED | OUTPATIENT
Start: 2020-02-03 | End: 2020-02-05 | Stop reason: HOSPADM

## 2020-02-03 RX ADMIN — METRONIDAZOLE 500 MG: 500 TABLET ORAL at 21:00

## 2020-02-03 RX ADMIN — SODIUM CHLORIDE, SODIUM LACTATE, POTASSIUM CHLORIDE, AND CALCIUM CHLORIDE 125 ML/HR: .6; .31; .03; .02 INJECTION, SOLUTION INTRAVENOUS at 06:25

## 2020-02-03 RX ADMIN — CEFAZOLIN SODIUM 2000 MG: 2 SOLUTION INTRAVENOUS at 20:58

## 2020-02-03 RX ADMIN — METOPROLOL TARTRATE 25 MG: 25 TABLET ORAL at 15:35

## 2020-02-03 RX ADMIN — ROCURONIUM BROMIDE 30 MG: 10 SOLUTION INTRAVENOUS at 10:55

## 2020-02-03 RX ADMIN — NEOSTIGMINE METHYLSULFATE 3 MG: 1 INJECTION INTRAVENOUS at 11:22

## 2020-02-03 RX ADMIN — Medication 100 MG: at 10:48

## 2020-02-03 RX ADMIN — ATORVASTATIN CALCIUM 10 MG: 10 TABLET, FILM COATED ORAL at 08:16

## 2020-02-03 RX ADMIN — CEFAZOLIN SODIUM 2000 MG: 2 SOLUTION INTRAVENOUS at 10:40

## 2020-02-03 RX ADMIN — LISINOPRIL 20 MG: 20 TABLET ORAL at 08:16

## 2020-02-03 RX ADMIN — CEFAZOLIN SODIUM 2000 MG: 2 SOLUTION INTRAVENOUS at 03:52

## 2020-02-03 RX ADMIN — METRONIDAZOLE 500 MG: 500 TABLET ORAL at 15:36

## 2020-02-03 RX ADMIN — DEXAMETHASONE SODIUM PHOSPHATE 10 MG: 10 INJECTION, SOLUTION INTRAMUSCULAR; INTRAVENOUS at 10:54

## 2020-02-03 RX ADMIN — PHENYLEPHRINE HYDROCHLORIDE 100 MCG: 10 INJECTION INTRAVENOUS at 11:01

## 2020-02-03 RX ADMIN — HYDROMORPHONE HYDROCHLORIDE 0.5 MG: 1 INJECTION, SOLUTION INTRAMUSCULAR; INTRAVENOUS; SUBCUTANEOUS at 08:17

## 2020-02-03 RX ADMIN — PHENYLEPHRINE HYDROCHLORIDE 100 MCG: 10 INJECTION INTRAVENOUS at 10:53

## 2020-02-03 RX ADMIN — HYDROMORPHONE HYDROCHLORIDE 0.5 MG: 1 INJECTION, SOLUTION INTRAMUSCULAR; INTRAVENOUS; SUBCUTANEOUS at 03:46

## 2020-02-03 RX ADMIN — ONDANSETRON 4 MG: 2 INJECTION INTRAMUSCULAR; INTRAVENOUS at 10:54

## 2020-02-03 RX ADMIN — PROPOFOL 200 MG: 10 INJECTION, EMULSION INTRAVENOUS at 10:47

## 2020-02-03 RX ADMIN — TAMSULOSIN HYDROCHLORIDE 0.4 MG: 0.4 CAPSULE ORAL at 17:38

## 2020-02-03 RX ADMIN — LIDOCAINE HYDROCHLORIDE 50 MG: 10 INJECTION, SOLUTION EPIDURAL; INFILTRATION; INTRACAUDAL; PERINEURAL at 10:47

## 2020-02-03 RX ADMIN — FENTANYL CITRATE 50 MCG: 50 INJECTION INTRAMUSCULAR; INTRAVENOUS at 10:47

## 2020-02-03 RX ADMIN — METRONIDAZOLE 500 MG: 500 TABLET ORAL at 06:36

## 2020-02-03 RX ADMIN — GLYCOPYRROLATE 0.4 MG: 0.2 INJECTION, SOLUTION INTRAMUSCULAR; INTRAVENOUS at 11:22

## 2020-02-03 RX ADMIN — ALBUTEROL SULFATE 4 PUFF: 90 AEROSOL, METERED RESPIRATORY (INHALATION) at 10:50

## 2020-02-03 RX ADMIN — SUGAMMADEX 189 MG: 100 INJECTION, SOLUTION INTRAVENOUS at 11:42

## 2020-02-03 RX ADMIN — PHENYLEPHRINE HYDROCHLORIDE 30 MCG/MIN: 10 INJECTION INTRAVENOUS at 11:04

## 2020-02-03 RX ADMIN — FENTANYL CITRATE 25 MCG: 50 INJECTION INTRAMUSCULAR; INTRAVENOUS at 11:37

## 2020-02-03 NOTE — H&P
H&P Exam - General Surgery   Sarai Edward 66 y o  male MRN: [id number]  Unit/Bed#: S -01 Encounter: [id number]    Assessment/Plan     Assessment:  75M with RUQ pain, cholecystitis on CT/ US, elevated bilirubin concerning for intraductal stones    Plan:  MRCP this morning  Possible OR today vs GI consult for ERCP  Pain control PRN  NPO/ IVF  Held eliquis, hep gtt  OOB  SCD    History of Present Illness   HPI:  Sarai Edward is a 66 y o  male who presents with cholecystitis, possible choledocholithiasis  Pain in chest/ abdomen starting 3 days ago, now just in epigastrium and RUQ  Has not had pain like this before  No allieviating or worsening factors  Does not radiate  Pain is sharp and constant  Denies fevers, n/v  Has been passing flatus at home  CT scan and US showed signs of cholecystitis  Elevated bilirubin on labwork  Review of Systems:  No fevers, no chills  No headache, no dizziness  No sore throat, no nasal discharge  No chest pain, no palpitations  No SOB, no cough  No abdominal pain, no nausea, vomiting, diarrhea, constipation  No joint pain, no weakness  No pruritis, no rash  No depression, no anxiety    Historical Information   Past Medical History:   Diagnosis Date    [facility] ) 01/09/2018    [facility]    [facility]cardia [facility]/09/2018    [facility]    Dyslipidemia     Essential hypertension     Impaired fasting glucose     Premature atrial contractions     Sinus bradycardia-tachycardia syndrome ([facility] )      History reviewed  No pertinent surgical history    Social History   Social History     Substance and Sexual Activity   Alcohol Use Yes    Alcohol/week: 2 0 standard drinks    Types: 2 Glasses of wine per week    Comment: a day      Social History     Substance and Sexual Activity   Drug Use No     Social History     Tobacco Use   Smoking Status Never Smoker   Smokeless Tobacco Never Used     Family History:   Family History   Problem Relation Age of Onset    Microcephaly Father     Heart attack Father        Meds/Allergies   all medications and allergies reviewed  No Known Allergies    Objective   First Vitals:   Blood Pressure: 152/88 (02/02/20 1424)  Pulse: (!) 114 (02/02/20 1424)  Temperature: 98 2 °F (36 8 °C) (02/02/20 1427)  Temp Source: Oral (02/02/20 1427)  Respirations: 20 (02/02/20 1424)  Height: 5' 7" (170 2 cm) (02/02/20 2059)  Weight - Scale: 102 kg (225 lb 12 oz) (02/02/20 1424)  SpO2: 95 % (02/02/20 1424)    Current Vitals:   Blood Pressure: (!) 183/85 (02/03/20 0759)  Pulse: (!) 116 (02/03/20 0759)  Temperature: 98 4 °F (36 9 °C) (02/03/20 0759)  Temp Source: Oral (02/03/20 0759)  Respirations: 18 (02/03/20 0759)  Height: 5' 7" (170 2 cm) (02/02/20 2059)  Weight - Scale: 94 4 kg (208 lb 1 8 oz) (02/02/20 2059)  SpO2: 96 % (02/03/20 0759)      Intake/Output Summary (Last 24 hours) at 2/3/2020 0855  Last data filed at 2/2/2020 1951  Gross per 24 hour   Intake 1000 ml   Output    Net 1000 ml       Invasive Devices     Peripheral Intravenous Line            Peripheral IV 02/02/20 Right;Upper Forearm 1 day    Peripheral IV 02/02/20 Left Forearm less than 1 day                Physical Exam:  NAD, Aox3  MMM, NCAT  EOMI, PERRLA  Regular rate and rhythm  Equal chest expansion, unlabored respirations  Abd soft, NTND  Ext: No deficits, warm and well perfused  Normal mood and affect  Lab Results: I have personally reviewed pertinent lab results  Imaging: I have personally reviewed pertinent reports  and I have personally reviewed pertinent films in PACS  EKG, Pathology, and Other Studies: I have personally reviewed pertinent reports        Recent Results (from the past 36 hour(s))   ECG 12 lead    Collection Time: 02/02/20  3:13 PM   Result Value Ref Range    Ventricular Rate 111 BPM    Atrial Rate 111 BPM    RI Interval 128 ms    QRSD Interval 82 ms    QT Interval 298 ms    QTC Interval 405 ms    P Axis 234 degrees    QRS Axis -23 degrees    T Wave Axis 88 degrees   CBC and differential    Collection Time: 02/02/20  3:21 PM   Result Value Ref Range    WBC 16 77 (H) 4 31 - 10 16 Thousand/uL    RBC 4 52 3 88 - 5 62 Million/uL    Hemoglobin 14 7 12 0 - 17 0 g/dL    Hematocrit 43 2 36 5 - 49 3 %    MCV 96 82 - 98 fL    MCH 32 5 26 8 - 34 3 pg    MCHC 34 0 31 4 - 37 4 g/dL    RDW 14 5 11 6 - 15 1 %    MPV 9 9 8 9 - 12 7 fL    Platelets 349 718 - 917 Thousands/uL    nRBC 0 /100 WBCs    Neutrophils Relative 87 (H) 43 - 75 %    Immat GRANS % 1 0 - 2 %    Lymphocytes Relative 4 (L) 14 - 44 %    Monocytes Relative 8 4 - 12 %    Eosinophils Relative 0 0 - 6 %    Basophils Relative 0 0 - 1 %    Neutrophils Absolute 14 70 (H) 1 85 - 7 62 Thousands/µL    Immature Grans Absolute 0 17 0 00 - 0 20 Thousand/uL    Lymphocytes Absolute 0 59 (L) 0 60 - 4 47 Thousands/µL    Monocytes Absolute 1 25 (H) 0 17 - 1 22 Thousand/µL    Eosinophils Absolute 0 03 0 00 - 0 61 Thousand/µL    Basophils Absolute 0 03 0 00 - 0 10 Thousands/µL   Comprehensive metabolic panel    Collection Time: 02/02/20  3:21 PM   Result Value Ref Range    Sodium 135 (L) 136 - 145 mmol/L    Potassium 4 3 3 5 - 5 3 mmol/L    Chloride 101 100 - 108 mmol/L    CO2 26 21 - 32 mmol/L    ANION GAP 8 4 - 13 mmol/L    BUN 28 (H) 5 - 25 mg/dL    Creatinine 1 27 0 60 - 1 30 mg/dL    Glucose 138 65 - 140 mg/dL    Calcium 9 2 8 3 - 10 1 mg/dL    AST 25 5 - 45 U/L    ALT 39 12 - 78 U/L    Alkaline Phosphatase 59 46 - 116 U/L    Total Protein 7 4 6 4 - 8 2 g/dL    Albumin 3 6 3 5 - 5 0 g/dL    Total Bilirubin 2 31 (H) 0 20 - 1 00 mg/dL    eGFR 54 ml/min/1 73sq m   Lipase    Collection Time: 02/02/20  3:21 PM   Result Value Ref Range    Lipase 72 (L) 73 - 393 u/L   Troponin I    Collection Time: 02/02/20  3:21 PM   Result Value Ref Range    Troponin I 0 02 <=0 04 ng/mL   Urine Macroscopic, POC    Collection Time: 02/02/20  7:08 PM   Result Value Ref Range    Color, UA Yellow     Clarity, UA Clear     pH, UA 5 5 4 5 - 8 0    Leukocytes, UA Negative Negative    Nitrite, UA Negative Negative    Protein, UA 30 (1+) (A) Negative mg/dl    Glucose, UA Negative Negative mg/dl    Ketones, UA Trace (A) Negative mg/dl    Urobilinogen, UA 0 2 0 2, 1 0 E U /dl E U /dl    Bilirubin, UA Negative Negative    Blood, UA Negative Negative    Specific Gravity, UA 1 025 1 003 - 1 030   Urine Microscopic    Collection Time: 02/02/20  7:08 PM   Result Value Ref Range    RBC, UA None Seen None Seen, 0-5 /hpf    WBC, UA 0-1 (A) None Seen, 0-5, 5-55, 5-65 /hpf    Epithelial Cells None Seen None Seen, Occasional /hpf    Bacteria, UA None Seen None Seen, Occasional /hpf   Protime-INR    Collection Time: 02/02/20  8:06 PM   Result Value Ref Range    Protime 16 9 (H) 11 6 - 14 5 seconds    INR 1 45 (H) 0 84 - 1 19   APTT    Collection Time: 02/02/20  8:06 PM   Result Value Ref Range    PTT 32 23 - 37 seconds   Hepatic function panel    Collection Time: 02/02/20  8:06 PM   Result Value Ref Range    Total Bilirubin 1 88 (H) 0 20 - 1 00 mg/dL    Bilirubin, Direct 0 53 (H) 0 00 - 0 20 mg/dL    Alkaline Phosphatase 56 46 - 116 U/L    AST 23 5 - 45 U/L    ALT 37 12 - 78 U/L    Total Protein 6 7 6 4 - 8 2 g/dL    Albumin 3 2 (L) 3 5 - 5 0 g/dL   Basic metabolic panel    Collection Time: 02/03/20  3:28 AM   Result Value Ref Range    Sodium 136 136 - 145 mmol/L    Potassium 4 0 3 5 - 5 3 mmol/L    Chloride 103 100 - 108 mmol/L    CO2 23 21 - 32 mmol/L    ANION GAP 10 4 - 13 mmol/L    BUN 19 5 - 25 mg/dL    Creatinine 1 08 0 60 - 1 30 mg/dL    Glucose 126 65 - 140 mg/dL    Calcium 8 9 8 3 - 10 1 mg/dL    eGFR 66 ml/min/1 73sq m   CBC (With Platelets)    Collection Time: 02/03/20  3:28 AM   Result Value Ref Range    WBC 16 79 (H) 4 31 - 10 16 Thousand/uL    RBC 4 21 3 88 - 5 62 Million/uL    Hemoglobin 13 4 12 0 - 17 0 g/dL    Hematocrit 41 0 36 5 - 49 3 %    MCV 97 82 - 98 fL    MCH 31 8 26 8 - 34 3 pg    MCHC 32 7 31 4 - 37 4 g/dL    RDW 14 6 11 6 - 15 1 % Platelets 037 (L) 233 - 390 Thousands/uL    MPV 10 0 8 9 - 12 7 fL   APTT    Collection Time: 02/03/20  4:49 AM   Result Value Ref Range    PTT 44 (H) 23 - 37 seconds       Code Status: Level 1 - Full Code  Advance Directive and Living Will:      Power of :    POLST:      Counseling / Coordination of Care  Total floor / unit time spent today 30 minutes  Greater than 50% of total time was spent with the patient and / or family counseling and / or coordination of care  A description of the counseling / coordination of care: 30

## 2020-02-03 NOTE — ASSESSMENT & PLAN NOTE
Status post laparoscopic cholecystectomy earlier today  General surgery notes reviewed  Patient doing well from this standpoint  Continue management as per General surgery

## 2020-02-03 NOTE — ANESTHESIA PREPROCEDURE EVALUATION
Review of Systems/Medical History  Patient summary reviewed  Chart reviewed  No history of anesthetic complications     Cardiovascular  EKG reviewed, Hypertension , Dysrhythmias ,   Comment: Parox aflutter, pericarditis,  Pulmonary       GI/Hepatic      Comment: Acute cholecystis           Endo/Other     GYN       Hematology   Musculoskeletal       Neurology   Psychology           Physical Exam    Airway    Mallampati score: I  TM Distance: >3 FB  Neck ROM: full     Dental   No notable dental hx     Cardiovascular      Pulmonary      Other Findings        Anesthesia Plan  ASA Score- 2     Anesthesia Type- general with ASA Monitors  Additional Monitors:   Airway Plan: ETT  Comment: N/V preop: RSI  Plan Factors-  Patient did not smoke on day of surgery  Induction- intravenous and rapid sequence induction  Postoperative Plan-     Informed Consent- Anesthetic plan and risks discussed with patient  I personally reviewed this patient with the CRNA  Discussed and agreed on the Anesthesia Plan with the CRNA  Ajay Shi

## 2020-02-03 NOTE — ANESTHESIA POSTPROCEDURE EVALUATION
Post-Op Assessment Note    CV Status:  Stable  Pain Score: 0    Pain management: adequate     Mental Status:  Awake and lethargic   Hydration Status:  Euvolemic   PONV Controlled:  Controlled   Airway Patency:  Patent   Post Op Vitals Reviewed: Yes      Staff: CRNA, Anesthesiologist           BP  137/80   Temp   99 8   Pulse  102   Resp   17   SpO2   96

## 2020-02-03 NOTE — OP NOTE
OPERATIVE REPORT  PATIENT NAME: Thom Baugh    :  1945  MRN: [id number]  Pt Location: [facility] OR ROOM 03    SURGERY DATE: 2/3/2020    Surgeon(s) and Role:     * Darrel Koyanagi, DO - Primary     * Leann Talavera MD - Assisting    Preop Diagnosis:  Acute cholecystitis [K81 0], calculous    Post-Op Diagnosis Codes:     * Acute cholecystitis [K81 0], calculus, gangrenous    Procedure(s) (LRB):  CHOLECYSTECTOMY LAPAROSCOPIC (N/A)    Specimen(s):  ID Type Source Tests Collected by Time Destination   1 : GALLBLADDER - CC family/referring MD - Estefania Tsang MD Phone: [phone]; Fax: [phone] Tissue Gallbladder TISSUE EXAM Darrel Koyanagi, DO 2/3/2020 1049        Estimated Blood Loss:   Minimal    Drains:  Closed/Suction Drain Right RUQ Bulb 19 Fr  (Active)   Number of days: 0       Anesthesia Type:   General    Operative Indications:  Acute cholecystitis [K81 0]      Operative Findings:  acute calculous cholecystitis with gangrenous changes  Height 67 in weight 94 kg/200 lb  BMI 33  ASA 2  Wound class 2  No history of anesthetic complications     Cardiovascular  EKG reviewed, Hypertension , Dysrhythmias ,   Comment: Parox aflutter, pericarditis,  Pulmonary         GI/Hepatic        Comment: Acute cholecystis              Endo/Other       GYN         Hematology    Musculoskeletal         Neurology    Psychology           Complications:   None    Procedure and Technique:    Patient was brought into the operative suite and identified by visualization conversation and by arm band  Athrombotic pumps were placed  Patient was given preoperative antibiotics  After being placed under general anesthesia the abdomen was prepped and draped in a sterile fashion  A timeout was performed and it was assured that the prep was dry  Local was then instilled at the the umbilicus  A small vertical skin incision was made in the area of the local  Two Kocher clamps were used to bluntly dissect down to the fascia   The fascia was lifted up and divided in the midline  I then poked through the underlying peritoneum with a hemostat gaining access into the abdominal cavity  An 11 mm trocar was placed under direct visualization  Pneumoperitoneum was then established with CO2  5 mm trochars were then placed in the right upper quadrant under direct visualization  The gallbladder was very tense with gangrenous changes throughout the wall  This was decompressed with a nest hot aspirating needle  This made the gallbladder more manageable  Critical structures were essentially dissected out with the does hot sucker aspirated  The gallbladder was then lifted cephalad  The cystic duct and cystic artery were carefully dissected out  Both duct and artery were carefully hemoclipped and divided  Gallbladder is then taken off the liver using the Harmonic scalpel  Due to the amount of inflammation, 19 [employer] Fabio drain was placed in the gallbladder fossa and exited through the lateral most trocar site  Gallbladder was placed into an Endo-Catch bag  Gallbladder was then brought out through the umbilical port site  Remaining trochars were removed releasing the pneumoperitoneum  Fascia at the umbilical site was closed using 0 Vicryl in a figure-of-eight fashion  Copious irrigation was carried out of all trocar sites  More local was instilled and the remaining trocar sites  Skin incisions were closed using 4-0 Monocryl in a subcuticular fashion  Drain was anchored to the skin with 3 0 nylon  All wounds were then washed and dried  Sterile Histacryl was then applied   The patient was awakened in the operating room and returned to the recovery room in stable condition   I was present for the entire procedure    Patient Disposition:  PACU     SIGNATURE: Rudi Moran DO  DATE: February 3, 2020  TIME: 11:28 AM

## 2020-02-03 NOTE — ASSESSMENT & PLAN NOTE
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  Patient states that ordinarily he is bradycardic, and looking back at previous progress notes from Cardiology he is indeed bradycardic at baseline with episodes of intermittent atypical atrial flutter  Patient is tachycardic postoperatively with heart rate getting up in the 120s and 130s  We did give him a dose of p o  Metoprolol but will hold off on further p o  Metoprolol  On telemetry this appears to be a sinus tachycardia  EKG done yesterday also shows sinus tachycardia  Will get a repeat EKG this afternoon  Discussed with Cardiology who will see patient in consult  Will order p r n  IV Lopressor  Patient is on Eliquis but he is not on any rate control medication    Start back Eliquis when okay with General surgery team

## 2020-02-03 NOTE — PLAN OF CARE
Problem: Potential for Falls  Goal: Patient will remain free of falls  Description  INTERVENTIONS:  - Assess patient frequently for physical needs  -  Identify cognitive and physical deficits and behaviors that affect risk of falls    -  [place] fall precautions as indicated by assessment   - Educate patient/family on patient safety including physical limitations  - Instruct patient to call for assistance with activity based on assessment  - Modify environment to reduce risk of injury  - Consider OT/PT consult to assist with strengthening/mobility  Outcome: Progressing

## 2020-02-03 NOTE — CONSULTS
Consult- Jazmine Machuca 1945, 66 y o  male MRN: [id number]    Unit/Bed#: S -01 Encounter: [id number]    Primary Care Provider: Gil Overton MD   Date and time admitted to hospital: 2/2/2020  2:18 PM      Inpatient consult to Internal Medicine  Consult performed by: Caitlin Loomis DO  Consult ordered by: Shelton Gosselin, MD          HTN (hypertension)  Assessment & Plan  Continue lisinopril  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[facility])  Assessment & Plan  Patient has a history of tachy-winifred syndrome for which he sees Dr Haja Cardona  Patient states that ordinarily he is bradycardic, and looking back at previous progress notes from Cardiology he is indeed bradycardic at baseline with episodes of intermittent atypical atrial flutter  Patient is tachycardic postoperatively with heart rate getting up in the 120s and 130s  We did give him a dose of p o  Metoprolol but will hold off on further p o  Metoprolol  On telemetry this appears to be a sinus tachycardia  EKG done yesterday also shows sinus tachycardia  Will get a repeat EKG this afternoon  Discussed with Cardiology who will see patient in consult  Will order p r n  IV Lopressor  Patient is on Eliquis but he is not on any rate control medication  Start back Eliquis when okay with General surgery team     * Acute cholecystitis  Assessment & Plan  Status post laparoscopic cholecystectomy earlier today  General surgery notes reviewed  Patient doing well from this standpoint  Continue management as per General surgery  VTE Prophylaxis: Reason for no pharmacologic prophylaxis Patient had surgery today  / sequential compression device       Counseling / Coordination of Care Time: 1 hour  Greater than 50% of total time spent on patient counseling and coordination of care      History of Present Illness:    Patient is a 13-year-old  gentleman with past medical history of hypertension and tachy-winifred syndrome who had presented to the emergency room yesterday with chest pain and abdominal pain starting 3 days ago  It was in his epigastric area and right upper quadrant  Ultrasound and CT scan showed signs of acute cholecystitis  Patient went for laparoscopic cholecystectomy today which was done without any apparent complication  We were consulted for medical management  Postoperatively patient's heart rate has been getting up into the 120s and 130s  Per nursing, it has been getting up to the 150s when he ambulates  However, he is asymptomatic stating that he does not have any chest pain, shortness of breath or palpitations  He also denies any significant abdominal pain at the moment  No other issues reported  Review of Systems:    Review of systems as in HPI  All other review of systems negative      Past Medical and Surgical History:     Past Medical History:   Diagnosis Date    Atrial tachycardia, paroxysmal ([address] ) 01/09/2018    from allscripts chart    Bradycardia 01/09/2018    from [facility] chart    Dyslipidemia     Essential hypertension     Impaired fasting glucose     Premature atrial contractions     Sinus bradycardia-tachycardia syndrome (HCC)        Past Surgical History:   Procedure Laterality Date    CHOLECYSTECTOMY LAPAROSCOPIC N/A 2/3/2020    Procedure: CHOLECYSTECTOMY LAPAROSCOPIC;  Surgeon: Kate Luna DO;  Location: [facility];  Service: General       Meds/Allergies:    all medications and allergies reviewed    Allergies: No Known Allergies    Social History:     Marital Status: Single    Substance Use History:   Social History     Substance and Sexual Activity   Alcohol Use Yes    Alcohol/week: 2 0 standard drinks    Types: 2 Glasses of wine per week    Comment: a day      Social History     Tobacco Use   Smoking Status Never Smoker   Smokeless Tobacco Never Used     Social History     Substance and Sexual Activity   Drug Use No       Family History:    Family History   Problem Relation Age of Onset    Microcephaly Father     Heart attack Father        Physical Exam:     Vitals:   Blood Pressure: 119/74 (02/03/20 1535)  Pulse: (!) 121 (02/03/20 1535)  Temperature: 99 °F (37 2 °C) (02/03/20 1230)  Temp Source: Temporal (02/03/20 1003)  Respirations: (!) 25 (02/03/20 1245)  Height: 5' 7" (170 2 cm) (02/02/20 2059)  Weight - Scale: 94 4 kg (208 lb 1 8 oz) (02/02/20 2059)  SpO2: 94 % (02/03/20 1245)    Physical Exam   Constitutional: He is oriented to person, place, and time  He appears well-developed and well-nourished  No distress  HENT:   Head: Normocephalic and atraumatic  Eyes: No scleral icterus  Cardiovascular:   Tachycardic with regular rhythm  No murmur, rubs or gallops  Pulmonary/Chest: Effort normal and breath sounds normal  No respiratory distress  Abdominal:   Closed surgical laparoscopic incisions noted  Musculoskeletal: He exhibits no edema  Neurological: He is alert and oriented to person, place, and time  Psychiatric: He has a normal mood and affect  His behavior is normal          Additional Data:     Lab Results: I have personally reviewed pertinent reports  Results from last 7 days   Lab Units 02/03/20 0328 02/02/20  1521   WBC Thousand/uL 16 79* 16 77*   HEMOGLOBIN g/dL 13 4 14 7   HEMATOCRIT % 41 0 43 2   PLATELETS Thousands/uL 123* 151   NEUTROS PCT %  --  87*   LYMPHS PCT %  --  4*   MONOS PCT %  --  8   EOS PCT %  --  0     Results from last 7 days   Lab Units 02/03/20 0328 02/02/20 2006   POTASSIUM mmol/L 4 0  --    CHLORIDE mmol/L 103  --    CO2 mmol/L 23  --    BUN mg/dL 19  --    CREATININE mg/dL 1 08  --    CALCIUM mg/dL 8 9  --    ALK PHOS U/L  --  56   ALT U/L  --  37   AST U/L  --  23     Results from last 7 days   Lab Units 02/02/20 2006   INR  1 45*       Imaging: I have personally reviewed pertinent reports        Mri Abdomen Wo Contrast And Mrcp    Result Date: 2/3/2020  Narrative: MRI OF THE ABDOMEN WITHOUT CONTRAST WITH MRCP INDICATION:  Cholecystitis COMPARISON: Previous CT from February 2, 2020 previous ultrasound from February 2, 2020 TECHNIQUE:  MRI of the abdomen was performed without the administration of contrast using the following  using sequences: Axial T1 weighted in/out of phase images, multiplanar T2 weighted images, diffusion weighted and fat suppressed T1 weighted images  3D MRCP images were obtained with radial thick slabs and projections  3D rendering was performed from the acquisition scanner  FINDINGS: LOWER CHEST:   Right basilar density seen LIVER:  Hepatic steatosis seen  No suspicious mass  BILE DUCTS:  No intrahepatic or extrahepatic bile duct dilation  Common bile duct is normal in caliber  No choledocholithiasis, biliary stricture  A rounded the T2 hyperintensity seen in relation to the anterior aspect of the pancreatic portion of the common bile duct in image 27 series 4, measuring about 3 mm this may be an outpouching of the common bile duct of no clinical significance or may represent a small cystic pancreatic lesion GALLBLADDER:  Distended gallbladder There is pericholecystic fluid Cholelithiasis is seen with stone in the neck of the gallbladder PANCREAS:  A cystic pancreatic lesion is seen in the uncinate process of the pancreas, seen in image 22 series 3, measuring 4 5 mm ADRENAL GLANDS:  Normal  SPLEEN:  Normal  KIDNEYS/PROXIMAL URETERS:  No hydroureteronephrosis  Focal area seen within the right kidney in the region of patient's previous ablation, unchanged A left renal cyst seen in the lower pole of the left kidney BOWEL:  No dilated loops of bowel  PERITONEAL CAVITY/RETROPERITONEUM:  Small amount of perihepatic fluid seen Edema in the mesentery noted LYMPH NODES:  No significant inguinal lymphadenopathy VASCULAR STRUCTURES:  The celiac trunk, SMA are patent ABDOMINAL WALL:  Unremarkable  OSSEOUS STRUCTURES:  No suspicious osseous lesion  Impression: Cholelithiasis with acute cholecystitis No choledocholithiasis   No biliary dilation seen Incidentally detected 4 5 mm cystic pancreatic lesion in the uncinate process of the pancreas  Follow-up MRI suggested to reassess Hepatic steatosis and mesenteric edema, small amount of free fluid in perihepatic fluid The study was marked in EPIC for significant notification  A follow-up notification has been created in the Epic Workstation performed: [id number]     Us Right Upper Quadrant    Result Date: 2/2/2020  Narrative: RIGHT UPPER QUADRANT ULTRASOUND INDICATION:     pain  COMPARISON:  CT dated 2/20/2020 TECHNIQUE:   Real-time ultrasound of the right upper quadrant was performed with a curvilinear transducer with both volumetric sweeps and still imaging techniques  FINDINGS: PANCREAS:  Visualized portions of the pancreas are within normal limits  AORTA AND IVC:  Visualized portions are normal for patient age  LIVER: Size:  Mildly enlarged  The liver measures 17 6 cm in the midclavicular line  Contour:  Surface contour is smooth  Parenchyma: There is moderate diffuse increased echogenicity with smooth echotexture and acoustic beam attenuation  Most consistent with moderate hepatic steatosis  No evidence of suspicious mass  Limited imaging of the main portal vein shows it to be patent and hepatopetal   BILIARY: The gallbladder is slightly distended    No wall thickening identified  Trace pericholecystic fluid is noted  Nonmobile calculus in the gallbladder neck is present  A positive sonographic Simpson's sign was elicited  No intrahepatic biliary dilatation  CBD measures 3 8 mm  No choledocholithiasis  KIDNEY: Right kidney measures 12 2 x 5 3 cm  In the posterior lateral interpolar region is a 2 4 x 1 7 x 2 3 cm cortical lesion with some calcification within it corresponding to lesion identified on recent CT from earlier today which was previously treated with cryoablation  Please refer to CT report  ASCITES:   None  Impression: 1   Gallstones with mild amount of pericholecystic fluid and sonographic Simpson sign with slightly distended gallbladder  Findings are consistent with acute cholecystitis as noted on the CT exam from earlier today  2  Hepatomegaly with diffuse hepatic steatosis  3  Right renal lesion as described above  The study was marked in [facility] for immediate notification  Workstation performed: [id number]     Ct Abdomen Pelvis With Contrast    Result Date: 2/2/2020  Narrative: CT ABDOMEN AND PELVIS WITH IV CONTRAST INDICATION:   LLQ abdominal pain, diverticulitis suspected  COMPARISON:  None  TECHNIQUE:  CT examination of the abdomen and pelvis was performed  Axial, sagittal, and coronal 2D reformatted images were created from the source data and submitted for interpretation  Radiation dose length product (DLP) for this visit:  1150 mGy-cm   This examination, like all CT scans performed in the [facility], was performed utilizing techniques to minimize radiation dose exposure, including the use of iterative reconstruction and automated exposure control  IV Contrast:  100 mL of iohexol (OMNIPAQUE) Enteric Contrast:  Enteric contrast was not administered  FINDINGS: ABDOMEN LOWER CHEST:  Bibasilar density seen compatible with atelectasis Left basilar density seen along the fissure suggests atelectasis LIVER/BILIARY TREE:  Hepatic steatosis seen GALLBLADDER:  Gallbladder is distended There is pericholecystic inflammatory stranding suggest acute cholecystitis The CBD measures 0 8 cm A calculus is noted within the gallbladder neck SPLEEN:  Unremarkable  PANCREAS:  Peripancreatic fluid collection seen ADRENAL GLANDS:  Unremarkable  KIDNEYS/URETERS:  Spleen appear unremarkable Postsurgical changes are seen within the right kidney A nodular soft tissue density with some enhancement is noted in the mid right kidney at its the right posterior aspect  This lesion demonstrates the macroscopic fat    This is remained stable STOMACH AND BOWEL:  Mild infiltration seen in relation to the hepatic flexure probably due to tracking from the gallbladder information There is diverticulosis There is no definite APPENDIX:  No findings to suggest appendicitis  ABDOMINOPELVIC CAVITY:  No ascites or free intraperitoneal air  No lymphadenopathy  VESSELS:  The celiac trunk, SMA are patent 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  On the previous study of 2015 this was measuring 2 cm PELVIS REPRODUCTIVE ORGANS:  Prominent prostate seen URINARY BLADDER:  The urinary bladder is mildly distended ABDOMINAL WALL/INGUINAL REGIONS:  Umbilical hernia seen OSSEOUS STRUCTURES:  No acute compression collapse of the vertebra Degenerative changes seen within the lumbar There is positive     Impression: Distended gallbladder with stone in the neck of the gallbladder and surrounding pericholecystic infiltration suggest acute cholecystitis Mild infiltration seen in the right upper quadrant adjacent to the hepatic flexure likely due to tracking of infiltration from the adjacent gallbladder inflammation No fluid collection Common bile duct measures 8 mm and at the upper limits 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  On the previous study of April 2015 this was measuring about 2 cm MID right renal lesion with surgical clips, macroscopic fat, stable for period of about 5 years at the level of the previous cryoablation The study was marked in [place] for immediate notification  Workstation performed: [id number]       ** Please Note: This note has been constructed using a voice recognition system   **

## 2020-02-03 NOTE — PERIOPERATIVE NURSING NOTE
Pt meeting PACU discharge criteria  Report called to [id number]  Will transfer pt back to room 233

## 2020-02-04 LAB
ALBUMIN SERPL BCP-MCNC: 2.5 G/DL (ref 3.5–5)
ALP SERPL-CCNC: 59 U/L (ref 46–116)
ALT SERPL W P-5'-P-CCNC: 50 U/L (ref 12–78)
ANION GAP SERPL CALCULATED.3IONS-SCNC: 8 MMOL/L (ref 4–13)
AST SERPL W P-5'-P-CCNC: 48 U/L (ref 5–45)
ATRIAL RATE: 115 BPM
BASOPHILS # BLD AUTO: 0.01 THOUSANDS/ΜL (ref 0–0.1)
BASOPHILS NFR BLD AUTO: 0 % (ref 0–1)
BILIRUB SERPL-MCNC: 0.92 MG/DL (ref 0.2–1)
BUN SERPL-MCNC: 22 MG/DL (ref 5–25)
CALCIUM SERPL-MCNC: 8.2 MG/DL (ref 8.3–10.1)
CHLORIDE SERPL-SCNC: 104 MMOL/L (ref 100–108)
CO2 SERPL-SCNC: 27 MMOL/L (ref 21–32)
CREAT SERPL-MCNC: 1.28 MG/DL (ref 0.6–1.3)
EOSINOPHIL # BLD AUTO: 0 THOUSAND/ΜL (ref 0–0.61)
EOSINOPHIL NFR BLD AUTO: 0 % (ref 0–6)
ERYTHROCYTE [DISTWIDTH] IN BLOOD BY AUTOMATED COUNT: 14.6 % (ref 11.6–15.1)
GFR SERPL CREATININE-BSD FRML MDRD: 54 ML/MIN/1.73SQ M
GLUCOSE SERPL-MCNC: 147 MG/DL (ref 65–140)
HCT VFR BLD AUTO: 36.9 % (ref 36.5–49.3)
HGB BLD-MCNC: 12.3 G/DL (ref 12–17)
IMM GRANULOCYTES # BLD AUTO: 0.2 THOUSAND/UL (ref 0–0.2)
IMM GRANULOCYTES NFR BLD AUTO: 2 % (ref 0–2)
LYMPHOCYTES # BLD AUTO: 0.67 THOUSANDS/ΜL (ref 0.6–4.47)
LYMPHOCYTES NFR BLD AUTO: 5 % (ref 14–44)
MAGNESIUM SERPL-MCNC: 1.8 MG/DL (ref 1.6–2.6)
MCH RBC QN AUTO: 32.1 PG (ref 26.8–34.3)
MCHC RBC AUTO-ENTMCNC: 33.3 G/DL (ref 31.4–37.4)
MCV RBC AUTO: 96 FL (ref 82–98)
MONOCYTES # BLD AUTO: 1.04 THOUSAND/ΜL (ref 0.17–1.22)
MONOCYTES NFR BLD AUTO: 8 % (ref 4–12)
NEUTROPHILS # BLD AUTO: 11.37 THOUSANDS/ΜL (ref 1.85–7.62)
NEUTS SEG NFR BLD AUTO: 85 % (ref 43–75)
NRBC BLD AUTO-RTO: 0 /100 WBCS
PLATELET # BLD AUTO: 129 THOUSANDS/UL (ref 149–390)
PMV BLD AUTO: 9.8 FL (ref 8.9–12.7)
POTASSIUM SERPL-SCNC: 4.2 MMOL/L (ref 3.5–5.3)
PR INTERVAL: 160 MS
PROT SERPL-MCNC: 6.1 G/DL (ref 6.4–8.2)
QRS AXIS: -16 DEGREES
QRSD INTERVAL: 78 MS
QT INTERVAL: 330 MS
QTC INTERVAL: 456 MS
RBC # BLD AUTO: 3.83 MILLION/UL (ref 3.88–5.62)
SODIUM SERPL-SCNC: 139 MMOL/L (ref 136–145)
T WAVE AXIS: -16 DEGREES
VENTRICULAR RATE: 115 BPM
WBC # BLD AUTO: 13.29 THOUSAND/UL (ref 4.31–10.16)

## 2020-02-04 PROCEDURE — 93010 ELECTROCARDIOGRAM REPORT: CPT | Performed by: INTERNAL MEDICINE

## 2020-02-04 PROCEDURE — 83735 ASSAY OF MAGNESIUM: CPT | Performed by: INTERNAL MEDICINE

## 2020-02-04 PROCEDURE — 80053 COMPREHEN METABOLIC PANEL: CPT | Performed by: SURGERY

## 2020-02-04 PROCEDURE — 99221 1ST HOSP IP/OBS SF/LOW 40: CPT | Performed by: INTERNAL MEDICINE

## 2020-02-04 PROCEDURE — 85025 COMPLETE CBC W/AUTO DIFF WBC: CPT | Performed by: SURGERY

## 2020-02-04 RX ORDER — MAGNESIUM SULFATE 1 G/100ML
1 INJECTION INTRAVENOUS ONCE
Status: COMPLETED | OUTPATIENT
Start: 2020-02-04 | End: 2020-02-04

## 2020-02-04 RX ADMIN — SODIUM CHLORIDE, SODIUM LACTATE, POTASSIUM CHLORIDE, AND CALCIUM CHLORIDE 125 ML/HR: .6; .31; .03; .02 INJECTION, SOLUTION INTRAVENOUS at 09:47

## 2020-02-04 RX ADMIN — ATORVASTATIN CALCIUM 10 MG: 10 TABLET, FILM COATED ORAL at 09:24

## 2020-02-04 RX ADMIN — TAMSULOSIN HYDROCHLORIDE 0.4 MG: 0.4 CAPSULE ORAL at 17:59

## 2020-02-04 RX ADMIN — CEFAZOLIN SODIUM 2000 MG: 2 SOLUTION INTRAVENOUS at 12:52

## 2020-02-04 RX ADMIN — METRONIDAZOLE 500 MG: 500 TABLET ORAL at 14:19

## 2020-02-04 RX ADMIN — METRONIDAZOLE 500 MG: 500 TABLET ORAL at 21:17

## 2020-02-04 RX ADMIN — MAGNESIUM SULFATE HEPTAHYDRATE 1 G: 1 INJECTION, SOLUTION INTRAVENOUS at 11:44

## 2020-02-04 RX ADMIN — SODIUM CHLORIDE, SODIUM LACTATE, POTASSIUM CHLORIDE, AND CALCIUM CHLORIDE 125 ML/HR: .6; .31; .03; .02 INJECTION, SOLUTION INTRAVENOUS at 01:49

## 2020-02-04 RX ADMIN — CEFAZOLIN SODIUM 2000 MG: 2 SOLUTION INTRAVENOUS at 04:55

## 2020-02-04 RX ADMIN — METRONIDAZOLE 500 MG: 500 TABLET ORAL at 05:00

## 2020-02-04 RX ADMIN — CEFAZOLIN SODIUM 2000 MG: 2 SOLUTION INTRAVENOUS at 20:23

## 2020-02-04 RX ADMIN — LISINOPRIL 20 MG: 20 TABLET ORAL at 09:24

## 2020-02-04 NOTE — PROGRESS NOTES
Progress Note -Surgery PA  Olivier William 66 y o  male MRN: [id number]  Unit/Bed#: S -01 Encounter: [id number]      Assessment:  66year old male s/p lap edin  Plan:  -continue CONRADO drain  -ambulate  -pain control  -cards consult  -appreciate SLIM input  -diet as tolerated         Subjective/Objective     Subjective: Feels overall well  Tolerating diet  Pain controlled  Objective:     /92 (BP Location: Left arm)   Pulse 103   Temp 98 2 °F (36 8 °C) (Oral)   Resp 18   Ht 5' 7" (1 702 m)   Wt 94 4 kg (208 lb 1 8 oz)   SpO2 96%   BMI 32 60 kg/m²   I/O last 24 hours: In: 2746 9 [I V :2696 9; IV Piggyback:50]  Out: 537 [Urine:780; Drains:135]        Intake/Output Summary (Last 24 hours) at 2/4/2020 0804  Last data filed at 2/4/2020 0455  Gross per 24 hour   Intake 2647 92 ml   Output 915 ml   Net 1732 92 ml       Invasive Devices     Peripheral Intravenous Line            Peripheral IV 02/02/20 Right;Upper Forearm 2 days    Peripheral IV 02/02/20 Left Forearm 1 day          Drain            Closed/Suction Drain Right RUQ Bulb 19 Fr  less than 1 day                Physical Exam:  /92 (BP Location: Left arm)   Pulse 103   Temp 98 2 °F (36 8 °C) (Oral)   Resp 18   Ht 5' 7" (1 702 m)   Wt 94 4 kg (208 lb 1 8 oz)   SpO2 96%   BMI 32 60 kg/m²   General appearance: alert and oriented, in no acute distress and alert  Lungs: clear to auscultation bilaterally  Heart: regular rate and rhythm, S1, S2 normal, no murmur, click, rub or gallop  Abdomen: abdomen soft and round  incisions healing well   CONRADO functioning      Current Facility-Administered Medications:     atorvastatin (LIPITOR) tablet 10 mg, 10 mg, Oral, Daily, Meghan Moura MD, 10 mg at 02/03/20 0816    ceFAZolin (ANCEF) IVPB (premix) 2,000 mg, 2,000 mg, Intravenous, Q8H, Meghan Moura MD, Last Rate: 100 mL/hr at 02/04/20 0455, 2,000 mg at 02/04/20 0455    HYDROmorphone (DILAUDID) injection 0 2 mg, 0 2 mg, Intravenous, Q3H PRN, Ganesh De Alexandra Negro MD    lactated ringers infusion, 125 mL/hr, Intravenous, Continuous, Romeo Oro MD, Last Rate: 125 mL/hr at 02/04/20 0149, 125 mL/hr at 02/04/20 0149    lisinopril (ZESTRIL) tablet 20 mg, 20 mg, Oral, Daily, Romeo Oro MD, 20 mg at 02/03/20 0816    metoprolol (LOPRESSOR) injection 5 mg, 5 mg, Intravenous, Q6H PRN, Channing Shukla DO    metroNIDAZOLE (FLAGYL) tablet 500 mg, 500 mg, Oral, Q8H [address], Romeo Oro MD, 500 mg at 02/04/20 0500    [facility]etron [facility]) injection 4 mg, 4 mg, Intravenous, Q6H PRN, Romeo Oro MD    oxyCODONE-acetaminophen (PERCOCET) 5-325 mg per tablet 1 tablet, 1 tablet, Oral, Q4H PRN, Romeo Oro MD    oxyCODONE-acetaminophen (PERCOCET) 5-325 mg per tablet 2 tablet, 2 tablet, Oral, Q4H PRN, Romeo Oro MD    [facility]) capsule 0 4 mg, 0 4 mg, Oral, Daily With Elaine Whaley MD, 0 4 mg at 02/03/20 1738           Lab, Imaging and other studies:  CBC:   Lab Results   Component Value Date    WBC 13 29 (H) 02/04/2020    HGB 12 3 02/04/2020    HCT 36 9 02/04/2020    MCV 96 02/04/2020     (L) 02/04/2020    MCH 32 1 02/04/2020    MCHC 33 3 02/04/2020    RDW 14 6 02/04/2020    MPV 9 8 02/04/2020    NRBC 0 02/04/2020   , CMP:   Lab Results   Component Value Date    SODIUM 139 02/04/2020    K 4 2 02/04/2020     02/04/2020    CO2 27 02/04/2020    BUN 22 02/04/2020    CREATININE 1 28 02/04/2020    CALCIUM 8 2 (L) 02/04/2020    AST 48 (H) 02/04/2020    ALT 50 02/04/2020    ALKPHOS 59 02/04/2020    EGFR 54 02/04/2020         VTE Pharmacologic Prophylaxis: Sequential compression device (Venodyne)  and Heparin  VTE Mechanical Prophylaxis: sequential compression device    Rounds performed with nursing

## 2020-02-04 NOTE — CONSULTS
Consultation - Cardiology Team One  Jazmine Machuca 66 y o  male MRN: [id number]  Unit/Bed#: S -01 Encounter: [id number]    Inpatient consult to Cardiology  Consult performed by: ARIAN Dumas  Consult ordered by: Caitlin Loomis DO        Physician Requesting Consult: Janel Schwartz DO     Reason for Consult / Principal Problem: tachy/masoud syndrome    Assessment/ Plan;    Tachy-Masoud syndrome  Paroxysmal atrial flutter/atrial tachycardia: Pt with known hx; typically bradycardic when in sinus rhythm; rates 40-60s no clear indications in past for PPM as he has not been symptomatic  Has had paroxysmal atrial tachycardia and atrial flutter on OP monitoring; he has been on eliquis 5mg BID for this and is not on any AV lafonso agents  Now post cholecystectomy he is tachycardia with HRs in 100-110s; ekg reviewed with attending; showing ectopic atrial tachycardia  He is not symptomatic  Given his hxof SSS and sinus bradycardia will review further management with attending  Has been off AC for past 48 hours  Given 1gm IV mag sulfate  Acute cholecystitis: POD #1 lap cholecystectomy; he has drain  Starting to take PO today  Afebrile  Resume AC with eliquis when ok from surgical standpoint  HTN: BP adequately controlled on home lisinopril 20mg daily    HLD: continue home atorvastatin 10mg daily  History of Present Illness      HPI: Jazmine Machuca is a 66y o  year old male who has a history of SSS/sinus bradycardia, paroxysmal atrial tachycardia/atrial flutter on Eliquis, HTN, HLD  He follows with cardiologist Dr Haja Cardona  Pt presented to ED 2/2 with complaints of abdominal pain and poor PO intake  Found to have acute cholecystitis and underwent laparoscopic cholecystectomy 2/3  He has been tachycardix on telemetry post operativly and cardiology asked to evaluate today  At time of my exam pt reports feeling better; he has some mild R sided abdominal pain  Is starting to take PO today   He is not having any chest pain/pressure, palpitations or SOB  He has past hx of SSS/sinus bradycardia with episodes of atrial tachycardia in past  He was evaluated by Dr Sandro Parsons and underwent 30 day event monitor 3/2019 that showed sinus rhythm/sinus bradycardia and paroxysmal atrial tachycardia with episodes of atrial flutter; he has been on eliquis for the past year  He has never been told he needs a pacemaker  No prior syncope  Has been feeling well from a cardiac standpoint in the past few weeks with no new symptoms  EKG reviewed personally:  2/3/2020   Ectopic atrial tachycardia   Rate 111    Telemetry reviewed personally:   Atrial tachycardia; rates 100-110  A few episodes of sinus bradycardia; no pauses    Review of Systems   Constitution: Negative for decreased appetite and fever  Cardiovascular: Negative for chest pain, dyspnea on exertion, leg swelling, near-syncope, orthopnea, palpitations and syncope  Respiratory: Negative for cough, shortness of breath and wheezing  Gastrointestinal: Positive for abdominal pain  Negative for nausea and vomiting  Genitourinary: Negative for dysuria  Neurological: Negative for dizziness and light-headedness  Psychiatric/Behavioral: Negative for altered mental status        Historical Information   Past Medical History:   Diagnosis Date    Atrial tachycardia, paroxysmal ([facility] [address] [facility] ) 01/09/2018    from allscripts chart    Bradycardia 01/09/2018    from allscripts chart    Dyslipidemia     Essential hypertension     Impaired fasting glucose     Premature atrial contractions     Sinus bradycardia-tachycardia syndrome ([facility] [address] )      Past Surgical History:   Procedure Laterality Date    CHOLECYSTECTOMY LAPAROSCOPIC N/A 2/3/2020    Procedure: CHOLECYSTECTOMY LAPAROSCOPIC;  Surgeon: Brown Rueda DO;  Location: [facility];  Service: General     Social History     Substance and Sexual Activity   Alcohol Use Yes    Alcohol/week: 2 0 standard drinks    Types: 2 Glasses of wine per week    Comment: a day      Social History     Substance and Sexual Activity   Drug Use No     Social History     Tobacco Use   Smoking Status Never Smoker   Smokeless Tobacco Never Used     Family History:   Family History   Problem Relation Age of Onset    Microcephaly Father     Heart attack Father      Meds/Allergies   all current active meds have been reviewed    lactated ringers 125 mL/hr Last Rate: 125 mL/hr (02/04/20 0149)     No Known Allergies    Objective   Vitals: Blood pressure 154/86, pulse (!) 108, temperature 98 5 °F (36 9 °C), temperature source Oral, resp  rate 18, height 5' 7" (1 702 m), weight 94 4 kg (208 lb 1 8 oz), SpO2 95 %  ,     Body mass index is 32 6 kg/m²  ,     Systolic (60PIW), GKR:644 , Min:107 , LRP:349     Diastolic (35AZN), VHW:11, Min:71, Max:99      Intake/Output Summary (Last 24 hours) at 2/4/2020 0919  Last data filed at 2/4/2020 0455  Gross per 24 hour   Intake 1162 5 ml   Output 635 ml   Net 527 5 ml     Weight (last 2 days)     Date/Time   Weight    02/02/20 2059   94 4 (208 11)    02/02/20 1424   102 (225 75)            Invasive Devices     Peripheral Intravenous Line            Peripheral IV 02/02/20 Right;Upper Forearm 2 days    Peripheral IV 02/02/20 Left Forearm 1 day          Drain            Closed/Suction Drain Right RUQ Bulb 19 Fr  less than 1 day              Physical Exam   Constitutional: He is oriented to person, place, and time  No distress  Pt sitting up in bed in NAD; alert and cooperative   HENT:   Head: Normocephalic and atraumatic  Neck: No JVD present  Cardiovascular: Normal rate, regular rhythm, S1 normal and S2 normal    No murmur heard  No LE edema   Pulmonary/Chest: Effort normal and breath sounds normal  He has no wheezes  He has no rales  Abdominal: Soft  Musculoskeletal: He exhibits no edema  Neurological: He is alert and oriented to person, place, and time  Skin: Skin is warm and dry  He is not diaphoretic  Psychiatric: He has a normal mood and affect  His behavior is normal    Nursing note and vitals reviewed  LABORATORY RESULTS:  Results from last 7 days   Lab Units 02/02/20  1521   TROPONIN I ng/mL 0 02     CBC with diff: Results from last 7 days   Lab Units 02/04/20  0006 02/03/20  0328 02/02/20  1521   WBC Thousand/uL 13 29* 16 79* 16 77*   HEMOGLOBIN g/dL 12 3 13 4 14 7   HEMATOCRIT % 36 9 41 0 43 2   MCV fL 96 97 96   PLATELETS Thousands/uL 129* 123* 151   MCH pg 32 1 31 8 32 5   MCHC g/dL 33 3 32 7 34 0   RDW % 14 6 14 6 14 5   MPV fL 9 8 10 0 9 9   NRBC AUTO /100 WBCs 0  --  0     CMP:  Results from last 7 days   Lab Units 02/04/20  0006 02/03/20  0328 02/02/20 2006 02/02/20  1521   POTASSIUM mmol/L 4 2 4 0  --  4 3   CHLORIDE mmol/L 104 103  --  101   CO2 mmol/L 27 23  --  26   BUN mg/dL 22 19  --  28*   CREATININE mg/dL 1 28 1 08  --  1 27   CALCIUM mg/dL 8 2* 8 9  --  9 2   AST U/L 48*  --  23 25   ALT U/L 50  --  37 39   ALK PHOS U/L 59  --  56 59   EGFR ml/min/1 73sq m 54 66  --  54     BMP:  Results from last 7 days   Lab Units 02/04/20  0006 02/03/20 0328 02/02/20  1521   POTASSIUM mmol/L 4 2 4 0 4 3   CHLORIDE mmol/L 104 103 101   CO2 mmol/L 27 23 26   BUN mg/dL 22 19 28*   CREATININE mg/dL 1 28 1 08 1 27   CALCIUM mg/dL 8 2* 8 9 9 2     Results from last 7 days   Lab Units 02/04/20  0326   MAGNESIUM mg/dL 1 8     Results from last 7 days   Lab Units 02/02/20 2006   INR  1 45*     Lipid Profile:   No results found for: CHOL  Lab Results   Component Value Date    HDL 47 08/29/2019    HDL 49 09/17/2018    HDL 51 02/06/2018     Lab Results   Component Value Date    LDLCALC 93 08/29/2019    LDLCALC 78 09/17/2018    LDLCALC 85 02/06/2018     Lab Results   Component Value Date    TRIG 94 08/29/2019    TRIG 112 09/17/2018    TRIG 88 02/06/2018     Imaging: I have personally reviewed pertinent reports      Mri Abdomen Wo Contrast And Mrcp    Result Date: 2/3/2020  Narrative: MRI OF THE ABDOMEN WITHOUT CONTRAST WITH MRCP INDICATION:  Cholecystitis COMPARISON:  Previous CT from February 2, 2020 previous ultrasound from February 2, 2020 TECHNIQUE:  MRI of the abdomen was performed without the administration of contrast using the following  using sequences: Axial T1 weighted in/out of phase images, multiplanar T2 weighted images, diffusion weighted and fat suppressed T1 weighted images  3D MRCP images were obtained with radial thick slabs and projections  3D rendering was performed from the acquisition scanner  FINDINGS: LOWER CHEST:   Right basilar density seen LIVER:  Hepatic steatosis seen  No suspicious mass  BILE DUCTS:  No intrahepatic or extrahepatic bile duct dilation  Common bile duct is normal in caliber  No choledocholithiasis, biliary stricture  A rounded the T2 hyperintensity seen in relation to the anterior aspect of the pancreatic portion of the common bile duct in image 27 series 4, measuring about 3 mm this may be an outpouching of the common bile duct of no clinical significance or may represent a small cystic pancreatic lesion GALLBLADDER:  Distended gallbladder There is pericholecystic fluid Cholelithiasis is seen with stone in the neck of the gallbladder PANCREAS:  A cystic pancreatic lesion is seen in the uncinate process of the pancreas, seen in image 22 series 3, measuring 4 5 mm ADRENAL GLANDS:  Normal  SPLEEN:  Normal  KIDNEYS/PROXIMAL URETERS:  No hydroureteronephrosis  Focal area seen within the right kidney in the region of patient's previous ablation, unchanged A left renal cyst seen in the lower pole of the left kidney BOWEL:  No dilated loops of bowel  PERITONEAL CAVITY/RETROPERITONEUM:  Small amount of perihepatic fluid seen Edema in the mesentery noted LYMPH NODES:  No significant inguinal lymphadenopathy VASCULAR STRUCTURES:  The celiac trunk, SMA are patent ABDOMINAL WALL:  Unremarkable  OSSEOUS STRUCTURES:  No suspicious osseous lesion       Impression: Cholelithiasis with acute cholecystitis No choledocholithiasis  No biliary dilation seen Incidentally detected 4 5 mm cystic pancreatic lesion in the uncinate process of the pancreas  Follow-up MRI suggested to reassess Hepatic steatosis and mesenteric edema, small amount of free fluid in perihepatic fluid The study was marked in EPIC for significant notification  A follow-up notification has been created in the Epic Workstation performed: [id number]     Us Right Upper Quadrant    Result Date: 2/2/2020  Narrative: RIGHT UPPER QUADRANT ULTRASOUND INDICATION:     pain  COMPARISON:  CT dated 2/20/2020 TECHNIQUE:   Real-time ultrasound of the right upper quadrant was performed with a curvilinear transducer with both volumetric sweeps and still imaging techniques  FINDINGS: PANCREAS:  Visualized portions of the pancreas are within normal limits  AORTA AND IVC:  Visualized portions are normal for patient age  LIVER: Size:  Mildly enlarged  The liver measures 17 6 cm in the midclavicular line  Contour:  Surface contour is smooth  Parenchyma: There is moderate diffuse increased echogenicity with smooth echotexture and acoustic beam attenuation  Most consistent with moderate hepatic steatosis  No evidence of suspicious mass  Limited imaging of the main portal vein shows it to be patent and hepatopetal   BILIARY: The gallbladder is slightly distended    No wall thickening identified  Trace pericholecystic fluid is noted  Nonmobile calculus in the gallbladder neck is present  A positive sonographic Simpson's sign was elicited  No intrahepatic biliary dilatation  CBD measures 3 8 mm  No choledocholithiasis  KIDNEY: Right kidney measures 12 2 x 5 3 cm  In the posterior lateral interpolar region is a 2 4 x 1 7 x 2 3 cm cortical lesion with some calcification within it corresponding to lesion identified on recent CT from earlier today which was previously treated with cryoablation  Please refer to CT report  ASCITES:   None  Impression: 1  Gallstones with mild amount of pericholecystic fluid and sonographic Simpson sign with slightly distended gallbladder  Findings are consistent with acute cholecystitis as noted on the CT exam from earlier today  2  Hepatomegaly with diffuse hepatic steatosis  3  Right renal lesion as described above  The study was marked in [facility] for immediate notification  Workstation performed: [id number]     Ct Abdomen Pelvis With Contrast    Result Date: 2/2/2020  Narrative: CT ABDOMEN AND PELVIS WITH IV CONTRAST INDICATION:   LLQ abdominal pain, diverticulitis suspected  COMPARISON:  None  TECHNIQUE:  CT examination of the abdomen and pelvis was performed  Axial, sagittal, and coronal 2D reformatted images were created from the source data and submitted for interpretation  Radiation dose length product (DLP) for this visit:  1150 mGy-cm   This examination, like all CT scans performed in the [place], was performed utilizing techniques to minimize radiation dose exposure, including the use of iterative reconstruction and automated exposure control  IV Contrast:  100 mL of iohexol (OMNIPAQUE) Enteric Contrast:  Enteric contrast was not administered  FINDINGS: ABDOMEN LOWER CHEST:  Bibasilar density seen compatible with atelectasis Left basilar density seen along the fissure suggests atelectasis LIVER/BILIARY TREE:  Hepatic steatosis seen GALLBLADDER:  Gallbladder is distended There is pericholecystic inflammatory stranding suggest acute cholecystitis The CBD measures 0 8 cm A calculus is noted within the gallbladder neck SPLEEN:  Unremarkable  PANCREAS:  Peripancreatic fluid collection seen ADRENAL GLANDS:  Unremarkable  KIDNEYS/URETERS:  Spleen appear unremarkable Postsurgical changes are seen within the right kidney A nodular soft tissue density with some enhancement is noted in the mid right kidney at its the right posterior aspect  This lesion demonstrates the macroscopic fat    This is remained stable STOMACH AND BOWEL:  Mild infiltration seen in relation to the hepatic flexure probably due to tracking from the gallbladder information There is diverticulosis There is no definite APPENDIX:  No findings to suggest appendicitis  ABDOMINOPELVIC CAVITY:  No ascites or free intraperitoneal air  No lymphadenopathy  VESSELS:  The celiac trunk, SMA are patent 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  On the previous study of 2015 this was measuring 2 cm PELVIS REPRODUCTIVE ORGANS:  Prominent prostate seen URINARY BLADDER:  The urinary bladder is mildly distended ABDOMINAL WALL/INGUINAL REGIONS:  Umbilical hernia seen OSSEOUS STRUCTURES:  No acute compression collapse of the vertebra Degenerative changes seen within the lumbar There is positive     Impression: Distended gallbladder with stone in the neck of the gallbladder and surrounding pericholecystic infiltration suggest acute cholecystitis Mild infiltration seen in the right upper quadrant adjacent to the hepatic flexure likely due to tracking of infiltration from the adjacent gallbladder inflammation No fluid collection Common bile duct measures 8 mm and at the upper limits 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  On the previous study of April 2015 this was measuring about 2 cm MID right renal lesion with surgical clips, macroscopic fat, stable for period of about 5 years at the level of the previous cryoablation The study was marked in [facility] for immediate notification  Workstation performed: [id number]     Assessment  Principal Problem:    Acute cholecystitis  Active Problems:    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HCC)    HTN (hypertension)    Thank you for allowing us to participate in this patient's care  This pt will follow up with          once discharged  Counseling / Coordination of Care  Total floor / unit time spent today 45 minutes    Greater than 50% of total time was spent with the patient and / or family counseling and / or coordination of care  A description of the counseling / coordination of care: Review of history, current assessment, development of a plan  Code Status: Level 1 - Full Code    ** Please Note: Dragon 360 Dictation voice to text software may have been used in the creation of this document   **

## 2020-02-05 VITALS
SYSTOLIC BLOOD PRESSURE: 133 MMHG | OXYGEN SATURATION: 98 % | HEART RATE: 89 BPM | HEIGHT: 67 IN | TEMPERATURE: 98.5 F | BODY MASS INDEX: 32.66 KG/M2 | RESPIRATION RATE: 18 BRPM | DIASTOLIC BLOOD PRESSURE: 90 MMHG | WEIGHT: 208.11 LBS

## 2020-02-05 LAB
ALBUMIN SERPL BCP-MCNC: 2.3 G/DL (ref 3.5–5)
ALP SERPL-CCNC: 60 U/L (ref 46–116)
ALT SERPL W P-5'-P-CCNC: 46 U/L (ref 12–78)
ANION GAP SERPL CALCULATED.3IONS-SCNC: 8 MMOL/L (ref 4–13)
AST SERPL W P-5'-P-CCNC: 46 U/L (ref 5–45)
BASOPHILS # BLD AUTO: 0.02 THOUSANDS/ΜL (ref 0–0.1)
BASOPHILS NFR BLD AUTO: 0 % (ref 0–1)
BILIRUB SERPL-MCNC: 0.6 MG/DL (ref 0.2–1)
BUN SERPL-MCNC: 26 MG/DL (ref 5–25)
CALCIUM SERPL-MCNC: 8.7 MG/DL (ref 8.3–10.1)
CHLORIDE SERPL-SCNC: 107 MMOL/L (ref 100–108)
CO2 SERPL-SCNC: 27 MMOL/L (ref 21–32)
CREAT SERPL-MCNC: 1.01 MG/DL (ref 0.6–1.3)
EOSINOPHIL # BLD AUTO: 0.18 THOUSAND/ΜL (ref 0–0.61)
EOSINOPHIL NFR BLD AUTO: 2 % (ref 0–6)
ERYTHROCYTE [DISTWIDTH] IN BLOOD BY AUTOMATED COUNT: 14.5 % (ref 11.6–15.1)
GFR SERPL CREATININE-BSD FRML MDRD: 71 ML/MIN/1.73SQ M
GLUCOSE SERPL-MCNC: 119 MG/DL (ref 65–140)
HCT VFR BLD AUTO: 36.5 % (ref 36.5–49.3)
HGB BLD-MCNC: 11.7 G/DL (ref 12–17)
IMM GRANULOCYTES # BLD AUTO: 0.15 THOUSAND/UL (ref 0–0.2)
IMM GRANULOCYTES NFR BLD AUTO: 2 % (ref 0–2)
LYMPHOCYTES # BLD AUTO: 1.2 THOUSANDS/ΜL (ref 0.6–4.47)
LYMPHOCYTES NFR BLD AUTO: 12 % (ref 14–44)
MCH RBC QN AUTO: 31.4 PG (ref 26.8–34.3)
MCHC RBC AUTO-ENTMCNC: 32.1 G/DL (ref 31.4–37.4)
MCV RBC AUTO: 98 FL (ref 82–98)
MONOCYTES # BLD AUTO: 0.95 THOUSAND/ΜL (ref 0.17–1.22)
MONOCYTES NFR BLD AUTO: 9 % (ref 4–12)
NEUTROPHILS # BLD AUTO: 7.82 THOUSANDS/ΜL (ref 1.85–7.62)
NEUTS SEG NFR BLD AUTO: 75 % (ref 43–75)
NRBC BLD AUTO-RTO: 0 /100 WBCS
PLATELET # BLD AUTO: 141 THOUSANDS/UL (ref 149–390)
PMV BLD AUTO: 9.9 FL (ref 8.9–12.7)
POTASSIUM SERPL-SCNC: 3.8 MMOL/L (ref 3.5–5.3)
PROT SERPL-MCNC: 5.8 G/DL (ref 6.4–8.2)
RBC # BLD AUTO: 3.73 MILLION/UL (ref 3.88–5.62)
SODIUM SERPL-SCNC: 142 MMOL/L (ref 136–145)
WBC # BLD AUTO: 10.32 THOUSAND/UL (ref 4.31–10.16)

## 2020-02-05 PROCEDURE — 99232 SBSQ HOSP IP/OBS MODERATE 35: CPT | Performed by: INTERNAL MEDICINE

## 2020-02-05 PROCEDURE — 80053 COMPREHEN METABOLIC PANEL: CPT | Performed by: FAMILY MEDICINE

## 2020-02-05 PROCEDURE — 85025 COMPLETE CBC W/AUTO DIFF WBC: CPT | Performed by: FAMILY MEDICINE

## 2020-02-05 RX ORDER — OXYCODONE HYDROCHLORIDE AND ACETAMINOPHEN 5; 325 MG/1; MG/1
1 TABLET ORAL EVERY 4 HOURS PRN
Qty: 10 TABLET | Refills: 0 | Status: SHIPPED | OUTPATIENT
Start: 2020-02-05 | End: 2020-02-12 | Stop reason: ALTCHOICE

## 2020-02-05 RX ADMIN — CEFAZOLIN SODIUM 2000 MG: 2 SOLUTION INTRAVENOUS at 04:16

## 2020-02-05 RX ADMIN — LISINOPRIL 20 MG: 20 TABLET ORAL at 08:14

## 2020-02-05 RX ADMIN — METRONIDAZOLE 500 MG: 500 TABLET ORAL at 05:04

## 2020-02-05 RX ADMIN — ATORVASTATIN CALCIUM 10 MG: 10 TABLET, FILM COATED ORAL at 08:14

## 2020-02-05 NOTE — PROGRESS NOTES
Progress Note -General Surgery   Kiko Dyers 66 y o  male MRN: [id number]  Unit/Bed#: S -01 Encounter: [id number]    Assessment:  Patient is a 66 y o  male who presented with acute cholecystitis , now POD2 from laparoscopic cholecystectomy progressing well  Plan:  Surgical soft diet  Possible discharge today  Discontinue CONRADO drain prior to discharge  Possibly restart anticoagulation upon discharge    Subjective/Objective     Subjective:   No acute events overnight  Had BM, pain controlled, tolerating diet with no n/v     Objective:    Blood pressure 133/90, pulse 89, temperature 98 5 °F (36 9 °C), temperature source Oral, resp  rate 18, height 5' 7" (1 702 m), weight 94 4 kg (208 lb 1 8 oz), SpO2 98 %  ,Body mass index is 32 6 kg/m²  Intake/Output Summary (Last 24 hours) at 2/5/2020 0902  Last data filed at 2/5/2020 5025  Gross per 24 hour   Intake 1102 08 ml   Output 900 ml   Net 202 08 ml       Invasive Devices     Peripheral Intravenous Line            Peripheral IV 02/02/20 Right;Upper Forearm 3 days    Peripheral IV 02/02/20 Left Forearm 2 days          Drain            Closed/Suction Drain Right RUQ Bulb 19 Fr  1 day                Physical Exam:   Gen:  Well-developed, well-nourished male in NAD  CV: tachy  Lungs: Normal respiratory effort on Nc  Abd: soft, appropriately tender, nondistended, Incisions clean dry and intact  Conrado drain with 50 cc serosanguinous output  Neuro:  AxO x3      Results from last 7 days   Lab Units 02/05/20  0327 02/04/20  0006 02/03/20  0328   WBC Thousand/uL 10 32* 13 29* 16 79*   HEMOGLOBIN g/dL 11 7* 12 3 13 4   HEMATOCRIT % 36 5 36 9 41 0   PLATELETS Thousands/uL 141* 129* 123*     Results from last 7 days   Lab Units 02/05/20  0327 02/04/20  0006 02/03/20  0328   POTASSIUM mmol/L 3 8 4 2 4 0   CHLORIDE mmol/L 107 104 103   CO2 mmol/L 27 27 23   BUN mg/dL 26* 22 19   CREATININE mg/dL 1 01 1 28 1 08   CALCIUM mg/dL 8 7 8 2* 8 9     Results from last 7 days   Lab Units 02/03/20  1201 02/03/20  0449 02/02/20 2006   INR   --   --  1 45*   PTT seconds 45* 44* 32

## 2020-02-05 NOTE — DISCHARGE INSTRUCTIONS
Start Eliquis on 2/12/20    Please call the office when you leave to schedule an appointment to be seen in 2 weeks    Activity:    Do not lift more than 10 pounds (a gallon of milk) for 1-2 weeks post-operatively                 Walking is encouraged  Normal daily activities including climbing steps are okay  Do not engage in strenuous activity or contact sports for 4-6 weeks post-operatively    Return to Work:   Okay to return to work in one week    Diet:   You may return to your normal heart healthy diet  Wound Care: Your wound is closed with dissolvable stitches and glue  It is okay to shower  Wash incision gently with soap and water and pat dry  Do not soak incisions in bath water or swim for two weeks  Do not apply any creams or ointments  Pain Medication:   Please take as directed  No driving while taking narcotic pain medications    Other:  If you have questions after discharge please call the office      If you have increased pain, fever >101 5, increased drainage, redness or a bad smell at your surgery site, please call us immediately or come directly to the Emergency Room

## 2020-02-05 NOTE — DISCHARGE SUMMARY
Discharge Summary - Eldon Swartz 66 y o  male MRN: [id number]    Unit/Bed#: S -01 Encounter: [id number]    Admission Date: 2/2/2020   Discharge Date: 02/05/20    Admitting Diagnosis:   Acute cholecystitis [K81 0]  Abdominal pain [R10 9]  Paroxysmal atrial flutter ([address] ) [I48 92]    Discharge Diagnoses: Principal Problem:    Acute cholecystitis  Active Problems:    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[address] )    HTN (hypertension)      Consultations: SLIM, Cardiology     Procedures Performed: Procedure(s):  [address] [facility] Course: Eldon Swartz is a 66 y o  male admitted for acute cholecystitis  He came to the ED with abdominal pain after being on a cruise  Work up showed elevated bilirubin/gallstones and acute edin  An MRCP was ordered to determine if there was choledocholithiasis which was negative  He was taken to the OR for lap edin and gangrenous cholecystitis was found  A drain was left  SLIM and cardiology were consulted who managed him medically  He was advised to restart his eliquis on 2/12/20  When he was tolerating a regular diet and medically stable the drain was removed and he was discharged in stable condition with the directions to follow up in two weeks and to report distress  Condition at Discharge: good     Discharge instructions/Information to patient and family:   See after visit summary for information provided to patient and family  Provisions for Follow-Up Care:  See after visit summary for information related to follow-up care and any pertinent home health orders  Disposition: Home    Planned Readmission: No    Discharge Statement   I spent 30 minutes discharging the patient  This time was spent on the day of discharge  I had direct contact with the patient on the day of discharge  Additional documentation is required if more than 30 minutes were spent on discharge       Discharge Medications:  See after visit summary for reconciled discharge medications provided to patient and family

## 2020-02-05 NOTE — PROGRESS NOTES
General Cardiology   Progress Note -  [facility]   Betsy Dust 66 y o  male MRN: [id number]    Unit/Bed#: S -01 Encounter: [id number]    Assessment/ Plan:    Tachy-Masoud syndrome  Paroxysmal atrial flutter/atrial tachycardia: Pt with known hx; typically bradycardic when in sinus rhythm; he has been in an atrial tachycardia since his surgery; rates 100-110s; no sustained high rates  He has had bradycardic episodes into low 40s while in sinus rhythm  Not on any AV alfonso agents due to bradycardia  Will d/w attending further plan  Awaiting surgery clearance as to when to resume eliquis       Acute cholecystitis: POD #2 lap cholecystectomy; he has drain with serosang drainage         HTN: BP adequately controlled on home lisinopril 20mg daily     HLD: continue home atorvastatin 10mg daily  Subjective: Pt seen for follow up, no events overnight  He is feeling well today; has been OOB to chair without any symptoms including chest pain, sob, palpitations, dizziness or lightheadedness  Review of Systems   Constitution: Negative for decreased appetite and fever  Cardiovascular: Negative for chest pain, dyspnea on exertion, leg swelling, orthopnea, palpitations and syncope  Respiratory: Negative for cough, shortness of breath and wheezing  Gastrointestinal: Negative for abdominal pain, nausea and vomiting  Genitourinary: Negative for dysuria  Neurological: Negative for dizziness and light-headedness  Psychiatric/Behavioral: Negative for altered mental status  All other systems reviewed and are negative  Objective:   Vitals: Blood pressure 133/90, pulse 89, temperature 98 5 °F (36 9 °C), temperature source Oral, resp  rate 18, height 5' 7" (1 702 m), weight 94 4 kg (208 lb 1 8 oz), SpO2 98 %  ,       Body mass index is 32 6 kg/m²  ,     Systolic (11WPF), WMK:342 , Min:133 , WCS:147     Diastolic (39UFW), JQH:81, Min:84, Max:102          Intake/Output Summary (Last 24 hours) at 2/5/2020 7003  Last data filed at 2/5/2020 6206  Gross per 24 hour   Intake 1102 08 ml   Output 900 ml   Net 202 08 ml     Weight (last 2 days)     None        Telemetry Review:   Ectopic atrial tachycardia; -110s majority  He is still having some bradycardia with HRs dropping into 40s in sinus rhythm    Physical Exam   Constitutional: He is oriented to person, place, and time  No distress  Pt sitting up in chair in NAD; alert and cooperative   HENT:   Head: Normocephalic and atraumatic  Neck: No JVD present  Cardiovascular: Regular rhythm, S1 normal and S2 normal  Tachycardia present  No murmur heard  No LE edema   Pulmonary/Chest: Effort normal  He has wheezes (fine exp wheeze; on RA)  Abdominal: Soft  RLQ CONRADO drain noted with serosang drainage   Musculoskeletal: He exhibits no edema  Neurological: He is alert and oriented to person, place, and time  Skin: Skin is warm and dry  He is not diaphoretic  Psychiatric: He has a normal mood and affect  His behavior is normal    Nursing note and vitals reviewed      LABORATORY RESULTS  Results from last 7 days   Lab Units 02/02/20  1521   TROPONIN I ng/mL 0 02     CBC with diff:   Results from last 7 days   Lab Units 02/05/20  0327 02/04/20  0006 02/03/20 0328 02/02/20  1521   WBC Thousand/uL 10 32* 13 29* 16 79* 16 77*   HEMOGLOBIN g/dL 11 7* 12 3 13 4 14 7   HEMATOCRIT % 36 5 36 9 41 0 43 2   MCV fL 98 96 97 96   PLATELETS Thousands/uL 141* 129* 123* 151   MCH pg 31 4 32 1 31 8 32 5   MCHC g/dL 32 1 33 3 32 7 34 0   RDW % 14 5 14 6 14 6 14 5   MPV fL 9 9 9 8 10 0 9 9   NRBC AUTO /100 WBCs 0 0  --  0     CMP:  Results from last 7 days   Lab Units 02/05/20 0327 02/04/20  0006 02/03/20 0328 02/02/20 2006 02/02/20  1521   POTASSIUM mmol/L 3 8 4 2 4 0  --  4 3   CHLORIDE mmol/L 107 104 103  --  101   CO2 mmol/L 27 27 23  --  26   BUN mg/dL 26* 22 19  --  28*   CREATININE mg/dL 1 01 1 28 1 08  --  1 27   CALCIUM mg/dL 8 7 8 2* 8 9  --  9 2   AST U/L 46* 48* --  23 25   ALT U/L 46 50  --  37 39   ALK PHOS U/L 60 59  --  56 59   EGFR ml/min/1 73sq m 71 54 66  --  54     BMP:  Results from last 7 days   Lab Units 02/05/20  0327 02/04/20  0006 02/03/20  0328 02/02/20  1521   POTASSIUM mmol/L 3 8 4 2 4 0 4 3   CHLORIDE mmol/L 107 104 103 101   CO2 mmol/L 27 27 23 26   BUN mg/dL 26* 22 19 28*   CREATININE mg/dL 1 01 1 28 1 08 1 27   CALCIUM mg/dL 8 7 8 2* 8 9 9 2     Results from last 7 days   Lab Units 02/04/20  0326   MAGNESIUM mg/dL 1 8     Results from last 7 days   Lab Units 02/02/20 2006   INR  1 45*     Lipid Profile:   No results found for: CHOL  Lab Results   Component Value Date    HDL 47 08/29/2019    HDL 49 09/17/2018    HDL 51 02/06/2018     Lab Results   Component Value Date    LDLCALC 93 08/29/2019    LDLCALC 78 09/17/2018    LDLCALC 85 02/06/2018     Lab Results   Component Value Date    TRIG 94 08/29/2019    TRIG 112 09/17/2018    TRIG 88 02/06/2018     Meds/Allergies   all current active meds have been reviewed  Medications Prior to Admission   Medication    Acai 500 MG CAPS    apixaban (ELIQUIS) 5 mg    atorvastatin (LIPITOR) 10 mg tablet    lisinopril (ZESTRIL) 20 mg tablet    Misc Natural Products (TART CHERRY ADVANCED PO)    Multiple Vitamin (DAILY VALUE MULTIVITAMIN) TABS    Probiotic Product (PROBIOTIC-10) CAPS    tamsulosin (FLOMAX) 0 4 mg     Assessment:  Principal Problem:    Acute cholecystitis  Active Problems:    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HCC)    HTN (hypertension)    Counseling / Coordination of Care  Total floor / unit time spent today 20 minutes  Greater than 50% of total time was spent with the patient and / or family counseling and / or coordination of care  ** Please Note: Dragon 360 Dictation voice to text software may have been used in the creation of this document   **

## 2020-02-12 ENCOUNTER — OFFICE VISIT (OUTPATIENT)
Dept: CARDIOLOGY CLINIC | Facility: CLINIC | Age: 75
End: 2020-02-12
Payer: MEDICARE

## 2020-02-12 VITALS
WEIGHT: 206 LBS | HEIGHT: 67 IN | DIASTOLIC BLOOD PRESSURE: 84 MMHG | SYSTOLIC BLOOD PRESSURE: 130 MMHG | HEART RATE: 101 BPM | OXYGEN SATURATION: 94 % | BODY MASS INDEX: 32.33 KG/M2

## 2020-02-12 DIAGNOSIS — N40.1 BENIGN PROSTATIC HYPERPLASIA WITH LOWER URINARY TRACT SYMPTOMS, SYMPTOM DETAILS UNSPECIFIED: ICD-10-CM

## 2020-02-12 DIAGNOSIS — R73.01 IMPAIRED FASTING GLUCOSE: ICD-10-CM

## 2020-02-12 DIAGNOSIS — I49.5 SICK SINUS SYNDROME (HCC): ICD-10-CM

## 2020-02-12 DIAGNOSIS — E78.5 DYSLIPIDEMIA: ICD-10-CM

## 2020-02-12 DIAGNOSIS — E66.9 CLASS 1 OBESITY: ICD-10-CM

## 2020-02-12 DIAGNOSIS — I10 ESSENTIAL HYPERTENSION: ICD-10-CM

## 2020-02-12 DIAGNOSIS — I49.5 TACHY-BRADY SYNDROME (HCC): ICD-10-CM

## 2020-02-12 DIAGNOSIS — I48.92 PAROXYSMAL ATRIAL FLUTTER (HCC): Primary | ICD-10-CM

## 2020-02-12 PROCEDURE — 93000 ELECTROCARDIOGRAM COMPLETE: CPT | Performed by: INTERNAL MEDICINE

## 2020-02-12 PROCEDURE — 99214 OFFICE O/P EST MOD 30 MIN: CPT | Performed by: INTERNAL MEDICINE

## 2020-02-12 NOTE — PATIENT INSTRUCTIONS
1  Have ordered Zio XT patch for 14 days to be mailed to patient  This is being done for calculation of burden or frequency of atypical atrial flutter  2  Continue current medication for now  3  We will contact the patient when we receive the report of the extended monitoring patch  4  Tentative cardiology follow-up approximately six months with EKG
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
## 2020-02-13 ENCOUNTER — TELEPHONE (OUTPATIENT)
Dept: CARDIOLOGY CLINIC | Facility: CLINIC | Age: 75
End: 2020-02-13

## 2020-02-28 ENCOUNTER — APPOINTMENT (EMERGENCY)
Dept: CT IMAGING | Facility: HOSPITAL | Age: 75
DRG: 313 | End: 2020-02-28
Payer: MEDICARE

## 2020-02-28 ENCOUNTER — APPOINTMENT (INPATIENT)
Dept: CT IMAGING | Facility: HOSPITAL | Age: 75
DRG: 313 | End: 2020-02-28
Payer: MEDICARE

## 2020-02-28 ENCOUNTER — HOSPITAL ENCOUNTER (INPATIENT)
Facility: HOSPITAL | Age: 75
LOS: 1 days | Discharge: HOME | DRG: 313 | End: 2020-02-29
Attending: EMERGENCY MEDICINE | Admitting: INTERNAL MEDICINE
Payer: MEDICARE

## 2020-02-28 DIAGNOSIS — K81.0 ACUTE CHOLECYSTITIS: Primary | ICD-10-CM

## 2020-02-28 PROBLEM — I10 ESSENTIAL HYPERTENSION: Chronic | Status: ACTIVE | Noted: 2020-02-03

## 2020-02-28 PROBLEM — R07.9 CHEST PAIN: Status: ACTIVE | Noted: 2020-02-28

## 2020-02-28 PROBLEM — Z90.49 STATUS POST LAPAROSCOPIC CHOLECYSTECTOMY: Status: ACTIVE | Noted: 2020-02-28

## 2020-02-28 LAB
ALBUMIN SERPL BCP-MCNC: 3.5 G/DL (ref 3.5–5)
ALP SERPL-CCNC: 73 U/L (ref 46–116)
ALT SERPL W P-5'-P-CCNC: 43 U/L (ref 12–78)
ANION GAP SERPL CALCULATED.3IONS-SCNC: 10 MMOL/L (ref 4–13)
AST SERPL W P-5'-P-CCNC: 19 U/L (ref 5–45)
BASOPHILS # BLD AUTO: 0.03 THOUSANDS/ΜL (ref 0–0.1)
BASOPHILS NFR BLD AUTO: 0 % (ref 0–1)
BILIRUB SERPL-MCNC: 1.76 MG/DL (ref 0.2–1)
BUN SERPL-MCNC: 18 MG/DL (ref 5–25)
CALCIUM SERPL-MCNC: 9.3 MG/DL (ref 8.3–10.1)
CHLORIDE SERPL-SCNC: 102 MMOL/L (ref 100–108)
CO2 SERPL-SCNC: 26 MMOL/L (ref 21–32)
CREAT SERPL-MCNC: 1.04 MG/DL (ref 0.6–1.3)
EOSINOPHIL # BLD AUTO: 0.15 THOUSAND/ΜL (ref 0–0.61)
EOSINOPHIL NFR BLD AUTO: 2 % (ref 0–6)
ERYTHROCYTE [DISTWIDTH] IN BLOOD BY AUTOMATED COUNT: 14.2 % (ref 11.6–15.1)
GFR SERPL CREATININE-BSD FRML MDRD: 69 ML/MIN/1.73SQ M
GLUCOSE SERPL-MCNC: 126 MG/DL (ref 65–140)
HCT VFR BLD AUTO: 41.9 % (ref 36.5–49.3)
HGB BLD-MCNC: 14 G/DL (ref 12–17)
IMM GRANULOCYTES # BLD AUTO: 0.08 THOUSAND/UL (ref 0–0.2)
IMM GRANULOCYTES NFR BLD AUTO: 1 % (ref 0–2)
LIPASE SERPL-CCNC: 81 U/L (ref 73–393)
LYMPHOCYTES # BLD AUTO: 1.42 THOUSANDS/ΜL (ref 0.6–4.47)
LYMPHOCYTES NFR BLD AUTO: 15 % (ref 14–44)
MAGNESIUM SERPL-MCNC: 1.6 MG/DL (ref 1.6–2.6)
MCH RBC QN AUTO: 31.6 PG (ref 26.8–34.3)
MCHC RBC AUTO-ENTMCNC: 33.4 G/DL (ref 31.4–37.4)
MCV RBC AUTO: 95 FL (ref 82–98)
MONOCYTES # BLD AUTO: 1.35 THOUSAND/ΜL (ref 0.17–1.22)
MONOCYTES NFR BLD AUTO: 14 % (ref 4–12)
NEUTROPHILS # BLD AUTO: 6.51 THOUSANDS/ΜL (ref 1.85–7.62)
NEUTS SEG NFR BLD AUTO: 68 % (ref 43–75)
NRBC BLD AUTO-RTO: 0 /100 WBCS
PLATELET # BLD AUTO: 178 THOUSANDS/UL (ref 149–390)
PMV BLD AUTO: 10.2 FL (ref 8.9–12.7)
POTASSIUM SERPL-SCNC: 4 MMOL/L (ref 3.5–5.3)
PROT SERPL-MCNC: 7.5 G/DL (ref 6.4–8.2)
RBC # BLD AUTO: 4.43 MILLION/UL (ref 3.88–5.62)
SODIUM SERPL-SCNC: 138 MMOL/L (ref 136–145)
TROPONIN I SERPL-MCNC: <0.02 NG/ML
TROPONIN I SERPL-MCNC: <0.02 NG/ML
WBC # BLD AUTO: 9.54 THOUSAND/UL (ref 4.31–10.16)

## 2020-02-28 PROCEDURE — 83690 ASSAY OF LIPASE: CPT | Performed by: PSYCHIATRY & NEUROLOGY

## 2020-02-28 PROCEDURE — 74177 CT ABD & PELVIS W/CONTRAST: CPT

## 2020-02-28 PROCEDURE — 77012 CT SCAN FOR NEEDLE BIOPSY: CPT | Performed by: RADIOLOGY

## 2020-02-28 PROCEDURE — 99152 MOD SED SAME PHYS/QHP 5/>YRS: CPT

## 2020-02-28 PROCEDURE — 80053 COMPREHEN METABOLIC PANEL: CPT | Performed by: PSYCHIATRY & NEUROLOGY

## 2020-02-28 PROCEDURE — 84484 ASSAY OF TROPONIN QUANT: CPT | Performed by: PSYCHIATRY & NEUROLOGY

## 2020-02-28 PROCEDURE — 99285 EMERGENCY DEPT VISIT HI MDM: CPT

## 2020-02-28 PROCEDURE — 87205 SMEAR GRAM STAIN: CPT | Performed by: INTERNAL MEDICINE

## 2020-02-28 PROCEDURE — 83735 ASSAY OF MAGNESIUM: CPT | Performed by: PSYCHIATRY & NEUROLOGY

## 2020-02-28 PROCEDURE — 87070 CULTURE OTHR SPECIMN AEROBIC: CPT | Performed by: INTERNAL MEDICINE

## 2020-02-28 PROCEDURE — 0W9G3ZX DRAINAGE OF PERITONEAL CAVITY, PERCUTANEOUS APPROACH, DIAGNOSTIC: ICD-10-PCS | Performed by: RADIOLOGY

## 2020-02-28 PROCEDURE — 99223 1ST HOSP IP/OBS HIGH 75: CPT | Performed by: INTERNAL MEDICINE

## 2020-02-28 PROCEDURE — 10160 PNXR ASPIR ABSC HMTMA BULLA: CPT | Performed by: RADIOLOGY

## 2020-02-28 PROCEDURE — 36415 COLL VENOUS BLD VENIPUNCTURE: CPT | Performed by: PSYCHIATRY & NEUROLOGY

## 2020-02-28 PROCEDURE — 85025 COMPLETE CBC W/AUTO DIFF WBC: CPT | Performed by: PSYCHIATRY & NEUROLOGY

## 2020-02-28 PROCEDURE — 93005 ELECTROCARDIOGRAM TRACING: CPT

## 2020-02-28 PROCEDURE — 71275 CT ANGIOGRAPHY CHEST: CPT

## 2020-02-28 PROCEDURE — 99152 MOD SED SAME PHYS/QHP 5/>YRS: CPT | Performed by: RADIOLOGY

## 2020-02-28 PROCEDURE — 99285 EMERGENCY DEPT VISIT HI MDM: CPT | Performed by: PSYCHIATRY & NEUROLOGY

## 2020-02-28 RX ORDER — LISINOPRIL 20 MG/1
20 TABLET ORAL DAILY
Status: DISCONTINUED | OUTPATIENT
Start: 2020-02-28 | End: 2020-02-29 | Stop reason: HOSPADM

## 2020-02-28 RX ORDER — TAMSULOSIN HYDROCHLORIDE 0.4 MG/1
0.4 CAPSULE ORAL
Status: DISCONTINUED | OUTPATIENT
Start: 2020-02-28 | End: 2020-02-29 | Stop reason: HOSPADM

## 2020-02-28 RX ORDER — ACETAMINOPHEN 325 MG/1
650 TABLET ORAL EVERY 6 HOURS PRN
Status: DISCONTINUED | OUTPATIENT
Start: 2020-02-28 | End: 2020-02-29 | Stop reason: HOSPADM

## 2020-02-28 RX ORDER — ONDANSETRON 2 MG/ML
4 INJECTION INTRAMUSCULAR; INTRAVENOUS EVERY 6 HOURS PRN
Status: DISCONTINUED | OUTPATIENT
Start: 2020-02-28 | End: 2020-02-29 | Stop reason: HOSPADM

## 2020-02-28 RX ORDER — MIDAZOLAM HYDROCHLORIDE 2 MG/2ML
INJECTION, SOLUTION INTRAMUSCULAR; INTRAVENOUS CODE/TRAUMA/SEDATION MEDICATION
Status: COMPLETED | OUTPATIENT
Start: 2020-02-28 | End: 2020-02-28

## 2020-02-28 RX ORDER — SODIUM CHLORIDE 9 MG/ML
75 INJECTION, SOLUTION INTRAVENOUS CONTINUOUS
Status: DISCONTINUED | OUTPATIENT
Start: 2020-02-28 | End: 2020-02-29 | Stop reason: HOSPADM

## 2020-02-28 RX ORDER — IBUPROFEN 400 MG/1
400 TABLET ORAL EVERY 6 HOURS PRN
Status: DISCONTINUED | OUTPATIENT
Start: 2020-02-28 | End: 2020-02-29 | Stop reason: HOSPADM

## 2020-02-28 RX ORDER — FENTANYL CITRATE 50 UG/ML
INJECTION, SOLUTION INTRAMUSCULAR; INTRAVENOUS CODE/TRAUMA/SEDATION MEDICATION
Status: COMPLETED | OUTPATIENT
Start: 2020-02-28 | End: 2020-02-28

## 2020-02-28 RX ORDER — LANOLIN ALCOHOL/MO/W.PET/CERES
1 CREAM (GRAM) TOPICAL 3 TIMES DAILY
COMMUNITY

## 2020-02-28 RX ORDER — ATORVASTATIN CALCIUM 10 MG/1
10 TABLET, FILM COATED ORAL
Status: DISCONTINUED | OUTPATIENT
Start: 2020-02-28 | End: 2020-02-29 | Stop reason: HOSPADM

## 2020-02-28 RX ORDER — DIPHENHYDRAMINE HYDROCHLORIDE 50 MG/ML
INJECTION INTRAMUSCULAR; INTRAVENOUS CODE/TRAUMA/SEDATION MEDICATION
Status: COMPLETED | OUTPATIENT
Start: 2020-02-28 | End: 2020-02-28

## 2020-02-28 RX ADMIN — LISINOPRIL 20 MG: 20 TABLET ORAL at 16:41

## 2020-02-28 RX ADMIN — DIPHENHYDRAMINE HYDROCHLORIDE 25 MG: 50 INJECTION, SOLUTION INTRAMUSCULAR; INTRAVENOUS at 15:54

## 2020-02-28 RX ADMIN — MIDAZOLAM HYDROCHLORIDE 1 MG: 1 INJECTION, SOLUTION INTRAMUSCULAR; INTRAVENOUS at 15:52

## 2020-02-28 RX ADMIN — ATORVASTATIN CALCIUM 10 MG: 10 TABLET, FILM COATED ORAL at 16:41

## 2020-02-28 RX ADMIN — SODIUM CHLORIDE 75 ML/HR: 0.9 INJECTION, SOLUTION INTRAVENOUS at 16:41

## 2020-02-28 RX ADMIN — IOHEXOL 100 ML: 350 INJECTION, SOLUTION INTRAVENOUS at 11:45

## 2020-02-28 RX ADMIN — FENTANYL CITRATE 50 MCG: 50 INJECTION, SOLUTION INTRAMUSCULAR; INTRAVENOUS at 15:52

## 2020-02-28 RX ADMIN — TAMSULOSIN HYDROCHLORIDE 0.4 MG: 0.4 CAPSULE ORAL at 16:41

## 2020-02-28 NOTE — H&P
[address] [facility]  H&P- Carlos Courser 1945, 66 y o  male MRN: [id number]    Unit/Bed#: S -01 Encounter: [id number]    Primary Care Provider: Dwaine Thapa MD   Date and time admitted to hospital: 2/28/2020  8:49 AM        * Status post laparoscopic cholecystectomy  Assessment & Plan  · Patient had a laparoscopic cholecystectomy for acute calculous cholecystitis that was overall uncomplicated  He saw surgery 2 weeks ago who cleared him for regular activity/diet  Started with chest pain yesterday  Cardiac work up negative here  CT scan significant for air/fluid collection in the cholecystectomy bed with +/- infection  Not septic  · Admit patient to med/surg under inpatient status   · General Surgery consulted  · For IR drainage  · Hold off on antibiotics   · Pain control   · Hold Eliquis     Chest pain  Assessment & Plan  · Patient states that he has had chest pain since yesterday  Reports that it was a discomfort in the center of his chest  Worse with positional change  No other symptoms  EKG here and troponin x 2 negative  Reproducible with palpation   · No further work up needed   · Rest of management per primary problem     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[address] )  Assessment & Plan  · Patient is tachycardic on admission, but not in A  Fib or other rhythm  He does take Eliquis for pAF with his last dose being this morning  No ectopy noted   · No need for telemetry   · Continue to monitor vitals  · Hold off on Beta Blocker as patient has gotten bradycardic with beta blocker in the past     Essential hypertension  Assessment & Plan  · BP acceptable   · Continue Lisinopril       VTE Prophylaxis: Hold off on anticoagulation due to IR procedure   / sequential compression device   Code Status: Full code   POLST: POLST form is not discussed and not completed at this time    Discussion with family: None at bedside     Anticipated Length of Stay:  Patient will be admitted on an Inpatient basis with an anticipated length of stay of  Greater than 2 midnights  Justification for Hospital Stay: As per above assessment and plan     Total Time for Visit, including Counseling / Coordination of Care: 1 hour  Greater than 50% of this total time spent on direct patient counseling and coordination of care  Chief Complaint:   Chest Discomfort    History of Present Illness:    David Romo is a 66 y o  male with a history of recent cholecystectomy, Tachy-Masoud syndrome, and HTN who presents with chest discomfort since yesterday  Patient states that he was on his way to his brother's birthday yesterday when he began to have central chest discomfort  He states that the only exacerbating factor was movement  He denies any shortness of breath, palpitations, diaphoresis, nausea, or vomiting with the chest pain  He states that it was not even really a pain and has morbid discomfort  He states that he presented to the emergency department because of the persistent nature  He admits to having a cholecystectomy performed on February 2nd and states that he saw Dr Grande 2 weeks postoperatively and was cleared for regular activity and diet  He denies any changes at his surgical site  He reports he was tolerating food and drink  He denies any radiation of the pain  He denies any fevers or chills  Review of Systems:    Review of Systems   Constitutional: Negative for appetite change, chills, diaphoresis, fatigue and fever  HENT: Negative for congestion, rhinorrhea and sore throat  Eyes: Negative for visual disturbance  Respiratory: Negative for cough, chest tightness, shortness of breath and wheezing  Cardiovascular: Positive for chest pain  Negative for palpitations and leg swelling  Gastrointestinal: Negative for abdominal pain, constipation, diarrhea, nausea and vomiting  Genitourinary: Negative for dysuria  Musculoskeletal: Negative for arthralgias and myalgias     Neurological: Negative for dizziness, syncope, weakness, light-headedness, numbness and headaches  All other systems reviewed and are negative  Past Medical and Surgical History:     Past Medical History:   Diagnosis Date    Atrial tachycardia, paroxysmal ([facility] [address] ) 01/09/2018    from allscripts chart    Bradycardia 01/09/2018    from allscripts chart    Dyslipidemia     Essential hypertension     Impaired fasting glucose     Premature atrial contractions     Sinus bradycardia-tachycardia syndrome ([facility] [address] )        Past Surgical History:   Procedure Laterality Date    CHOLECYSTECTOMY LAPAROSCOPIC N/A 2/3/2020    Procedure: CHOLECYSTECTOMY LAPAROSCOPIC;  Surgeon: Renay Bonilla DO;  Location: [facility];  Service: General       Meds/Allergies:    Prior to Admission medications    Medication Sig Start Date End Date Taking? Authorizing Provider   glucosamine-chondroitin 500-400 MG tablet Take 1 tablet by mouth 3 (three) times a day   Yes Historical Provider, MD   Acai 500 MG CAPS Take by mouth    Historical Provider, MD   apixaban (ELIQUIS) 5 mg Take 1 tablet (5 mg total) by mouth 2 (two) times a day Start on 2/12/20 2/5/20   Raisa Friedman PA-C   atorvastatin (LIPITOR) 10 mg tablet Take 1 tablet (10 mg total) by mouth daily 12/18/19   Vega Michaud MD   lisinopril (ZESTRIL) 20 mg tablet Take 1 tablet by mouth daily    Historical Provider, MD   Misc Natural Products (TART CHERRY ADVANCED PO) Take by mouth    Historical Provider, MD   Multiple Vitamin (DAILY VALUE MULTIVITAMIN) TABS Take 1 tablet by mouth daily    Historical Provider, MD   Probiotic Product (PROBIOTIC-10) CAPS Take by mouth    Historical Provider, MD   tamsulosin (FLOMAX) 0 4 mg Take 0 4 mg by mouth    Historical Provider, MD     I have reviewed home medications with patient personally      Allergies: No Known Allergies    Social History:     Marital Status: Single   Occupation: Noncontributory   Patient Pre-hospital Living Situation: Home  Patient Pre-hospital Level of Mobility: Full  Patient Pre-hospital Diet Restrictions: None  Substance Use History:   Social History     Substance and Sexual Activity   Alcohol Use Yes    Alcohol/week: 2 0 standard drinks    Types: 2 Glasses of wine per week    Comment: a day      Social History     Tobacco Use   Smoking Status Never Smoker   Smokeless Tobacco Never Used     Social History     Substance and Sexual Activity   Drug Use No       Family History:    Family History   Problem Relation Age of Onset    Microcephaly Father     Heart attack Father        Physical Exam:     Vitals:   Blood Pressure: 139/97 (02/28/20 1427)  Pulse: 103 (02/28/20 1427)  Temperature: 98 2 °F (36 8 °C) (02/28/20 0857)  Temp Source: Oral (02/28/20 0857)  Respirations: 18 (02/28/20 1427)  Weight - Scale: 91 2 kg (201 lb 1 oz) (02/28/20 0857)  SpO2: 96 % (02/28/20 1427)    Physical Exam   Constitutional: He is oriented to person, place, and time  He appears well-developed and well-nourished  Non-toxic appearance  No distress  HENT:   Head: Normocephalic and atraumatic  Mouth/Throat: Mucous membranes are not dry  Eyes: Pupils are equal, round, and reactive to light  Conjunctivae and EOM are normal  No scleral icterus  Pupils are equal    Neck: Neck supple  Cardiovascular: Regular rhythm, S1 normal, S2 normal, normal heart sounds and intact distal pulses  Tachycardia present  Exam reveals no S3 and no S4  No murmur heard  Pulmonary/Chest: Effort normal and breath sounds normal  No accessory muscle usage or stridor  No respiratory distress  He has no decreased breath sounds  He has no wheezes  He has no rhonchi  He has no rales  He exhibits tenderness  Abdominal: Soft  Bowel sounds are normal  He exhibits no distension and no mass  There is no tenderness  There is no rigidity, no rebound and no guarding  Cholecystectomy scars noted, well healed    Neurological: He is alert and oriented to person, place, and time  He is not disoriented  He displays no tremor  He displays no seizure activity  Skin: Skin is warm and dry  Additional Data:     Lab Results: I have personally reviewed pertinent reports  Results from last 7 days   Lab Units 02/28/20  1002   WBC Thousand/uL 9 54   HEMOGLOBIN g/dL 14 0   HEMATOCRIT % 41 9   PLATELETS Thousands/uL 178   NEUTROS PCT % 68   LYMPHS PCT % 15   MONOS PCT % 14*   EOS PCT % 2     Results from last 7 days   Lab Units 02/28/20  1002   SODIUM mmol/L 138   POTASSIUM mmol/L 4 0   CHLORIDE mmol/L 102   CO2 mmol/L 26   BUN mg/dL 18   CREATININE mg/dL 1 04   ANION GAP mmol/L 10   CALCIUM mg/dL 9 3   ALBUMIN g/dL 3 5   TOTAL BILIRUBIN mg/dL 1 76*   ALK PHOS U/L 73   ALT U/L 43   AST U/L 19   GLUCOSE RANDOM mg/dL 126                       Imaging: I have personally reviewed pertinent reports  PE Study with CT Abdomen and Pelvis with contrast   Final Result by Albert Wynne MD (02/28 1224)      There is no pulmonary embolism  In this patient status post cholecystectomy on 2/2/2020, there is a 4 1 x 1 8 x 1 4 cm air/fluid collection in the cholecystectomy bed (series 4 images 26 through 30 ) While the overall size of the collection is small and not unusual, presence of air is    somewhat concerning for infection  Please correlate clinically and consider follow-up imaging to ensure resolution, or fluid sampling  Again again seen is a 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  Workstation performed: [id number]         IR image guided aspiration / drainage    (Results Pending)       EKG, Pathology, and Other Studies Reviewed on Admission:   · EKG: Sinus Tachy, 108 BPM  · CTA PE Chest CT Abdomen Pelvis: There is no pulmonary embolism  In this patient status post cholecystectomy on 02/02/2020 there is a 4 1 x 1 8 x 1 4 cm air/fluid collection in the cholecystectomy bed  While the overall size of the collection is small and not unusual, the presence of air somewhat concerning for infection    Again seen is a 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

## 2020-02-28 NOTE — CONSULTS
Consultation -General Surgery  Shaheed Boswell 66 y o  male MRN: [id number]  Unit/Bed#: ED 14 Encounter: [id number]        Inpatient consult to Acute Care Surgery  Consult performed by: Yony Kirk PA-C  Consult ordered by: Rina Mays DO          ASSESSMENT/PLAN:  Problem List     Idiopathic pericarditis    Paroxysmal atrial flutter ([facility] [address] )    Acute cholecystitis    Overview Signed 2/3/2020  6:36 AM by Carl Dance, DO     Added automatically from request for surgery [id number]         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[facility] [address] )    HTN (hypertension)      66year old male s/p lap edin with small fluid collection in cholecystectomy bed  -IR consult placed to see if aspiration could be done  -recheck CMP in AM  -No acute surgical intervention warranted  Patient dicussed with Dr Hiwot Gresham  -rest of care as per primary team    Reason for Consult / Principal Problem: chest pain s/p lap edin    HPI: Shaheed Boswell is a 66y o  year old male who presents with chest pain  He had a lap edin on 2/3/20 for gangrenous cholecystitis  A drain was left and pulled before discharge  He recently saw Dr Hiwot Gresham in the office on 2/19/20 and was doing well  Came to the ED with one day of chest pain  Denies abdominal pain, fever,chills or change in bowel habits  The chest pain is aggravated with movement  CTA was ordered that showed small fluid collection in liver bed so we are being consulted for further management  Review of Systems   Constitutional: Negative  HENT: Negative  Eyes: Negative  Respiratory: Negative  Cardiovascular: Positive for chest pain  Negative for palpitations and leg swelling  Gastrointestinal: Negative  Endocrine: Negative  Genitourinary: Negative  Musculoskeletal: Negative  Skin: Negative  Allergic/Immunologic: Negative  Neurological: Negative  Hematological: Negative  Psychiatric/Behavioral: Negative          Historical Information   Past Medical History:   Diagnosis Date    Atrial tachycardia, paroxysmal ([address] ) 01/09/2018    from allscripts chart    Bradycardia 01/09/2018    from allscripts chart    Dyslipidemia     Essential hypertension     Impaired fasting glucose     Premature atrial contractions     Sinus bradycardia-tachycardia syndrome ([facility])      Past Surgical History:   Procedure Laterality Date    CHOLECYSTECTOMY LAPAROSCOPIC N/A 2/3/2020    Procedure: CHOLECYSTECTOMY LAPAROSCOPIC;  Surgeon: Linda Ortiz DO;  Location: [facility];  Service: General     Social History   Social History     Substance and Sexual Activity   Alcohol Use Yes    Alcohol/week: 2 0 standard drinks    Types: 2 Glasses of wine per week    Comment: a day      Social History     Substance and Sexual Activity   Drug Use No     Social History     Tobacco Use   Smoking Status Never Smoker   Smokeless Tobacco Never Used     Family History   Problem Relation Age of Onset    Microcephaly Father     Heart attack Father        Meds/Allergies       (Not in a hospital admission)  No current facility-administered medications for this encounter  No Known Allergies    Objective     Blood pressure 117/84, pulse 104, temperature 98 2 °F (36 8 °C), temperature source Oral, resp  rate 18, weight 91 2 kg (201 lb 1 oz), SpO2 97 %    No intake or output data in the 24 hours ending 02/28/20 1301    PHYSICAL EXAM  General appearance: alert and oriented, in no acute distress and alert  Skin: Skin color, texture, turgor normal  No rashes or lesions  Head: Normocephalic, without obvious abnormality, atraumatic  Heart: regular rate and rhythm, S1, S2 normal, no murmur, click, rub or gallop  Lungs: clear to auscultation bilaterally  Abdomen: soft, non-tender; bowel sounds normal; no masses,  no organomegaly  Back: negative  Rectal: deferred  Neurological: normal without focal findings, mental status, speech normal, alert and oriented x3, FRANCY and reflexes normal and symmetric    Lab Results:   Admission on 02/28/2020 Component Date Value    WBC 02/28/2020 9 54     RBC 02/28/2020 4 43     Hemoglobin 02/28/2020 14 0     Hematocrit 02/28/2020 41 9     MCV 02/28/2020 95     MCH 02/28/2020 31 6     MCHC 02/28/2020 33 4     RDW 02/28/2020 14 2     MPV 02/28/2020 10 2     Platelets 60/76/5722 178     nRBC 02/28/2020 0     Neutrophils Relative 02/28/2020 68     Immat GRANS % 02/28/2020 1     Lymphocytes Relative 02/28/2020 15     Monocytes Relative 02/28/2020 14*    Eosinophils Relative 02/28/2020 2     Basophils Relative 02/28/2020 0     Neutrophils Absolute 02/28/2020 6 51     Immature Grans Absolute 02/28/2020 0 08     Lymphocytes Absolute 02/28/2020 1 42     Monocytes Absolute 02/28/2020 1 35*    Eosinophils Absolute 02/28/2020 0 15     Basophils Absolute 02/28/2020 0 03     Sodium 02/28/2020 138     Potassium 02/28/2020 4 0     Chloride 02/28/2020 102     CO2 02/28/2020 26     ANION GAP 02/28/2020 10     BUN 02/28/2020 18     Creatinine 02/28/2020 1 04     Glucose 02/28/2020 126     Calcium 02/28/2020 9 3     AST 02/28/2020 19     ALT 02/28/2020 43     Alkaline Phosphatase 02/28/2020 73     Total Protein 02/28/2020 7 5     Albumin 02/28/2020 3 5     Total Bilirubin 02/28/2020 1 76*    eGFR 02/28/2020 69     Magnesium 02/28/2020 1 6     Troponin I 02/28/2020 <0 02     Lipase 02/28/2020 81      Imaging Studies: I have personally reviewed pertinent reports  There is no pulmonary embolism      In this patient status post cholecystectomy on 2/2/2020, there is a 4 1 x 1 8 x 1 4 cm air/fluid collection in the cholecystectomy bed (series 4 images 26 through 30 ) While the overall size of the collection is small and not unusual, presence of air is   somewhat concerning for infection  Please correlate clinically and consider follow-up imaging to ensure resolution, or fluid sampling      Again again seen is a 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      Counseling / Coordination of Care  Total time spent today  30 minutes  Greater than 50% of total time was spent with the patient and / or family counseling and / or coordination of care

## 2020-02-28 NOTE — ED PROVIDER NOTES
History  Chief Complaint   Patient presents with    Chest Pain     pt states "soreness in chest" since yest afternoon  states given monitor to wear by cardiologist for 2 weeks but did not called them yest  pt states recent gallbladder surg  denies any other symptoms  Normal Edwin Jon is a 49-year-old male, presenting to [address] [place] [address] ED, status post cholecystectomy approximately 28 days ago, endorsing a nonradiating chest pressure, described like someone sitting on his chest, 5/10  Patient has a a pertinent history of sick sinus syndrome, tachy-winifred syndrome, hypertension and hyperlipidemia on Eliquis  He states he is managed by outpatient cardiology and has a Holter monitor to assess for paroxysmal atrial flutter  Patient is unaware a particular trigger, he states he consumed a high carbohydrate/high fat meal   Patient denies fever, chills, palpitations, shortness of breath, nausea/vomiting/diarrhea  Wells criteria 3  Heart score 7  Prior to Admission Medications   Prescriptions Last Dose Informant Patient Reported? Taking?    Acai 500 MG CAPS  Self Yes No   Sig: Take by mouth   Misc Natural Products (TART CHERRY ADVANCED PO)  Self Yes No   Sig: Take by mouth   Multiple Vitamin (DAILY VALUE MULTIVITAMIN) TABS  Self Yes No   Sig: Take 1 tablet by mouth daily   Probiotic Product (PROBIOTIC-10) CAPS  Self Yes No   Sig: Take by mouth   apixaban (ELIQUIS) 5 mg  Self No No   Sig: Take 1 tablet (5 mg total) by mouth 2 (two) times a day Start on 2/12/20   atorvastatin (LIPITOR) 10 mg tablet  Self No No   Sig: Take 1 tablet (10 mg total) by mouth daily   glucosamine-chondroitin 500-400 MG tablet   Yes Yes   Sig: Take 1 tablet by mouth 3 (three) times a day   lisinopril (ZESTRIL) 20 mg tablet  Self Yes No   Sig: Take 1 tablet by mouth daily   tamsulosin (FLOMAX) 0 4 mg  Self Yes No   Sig: Take 0 4 mg by mouth      Facility-Administered Medications: None       Past Medical History:   Diagnosis Date    Atrial tachycardia, paroxysmal ([facility] [address] ) 01/09/2018    from allscripts chart    Bradycardia 01/09/2018    from allscripts chart    Dyslipidemia     Essential hypertension     Impaired fasting glucose     Premature atrial contractions     [facility]        Past Surgical History:   Procedure Laterality Date    CHOLECYSTECTOMY LAPAROSCOPIC N/A 2/3/2020    Procedure: CHOLECYSTECTOMY LAPAROSCOPIC;  Surgeon: Georgie Tong DO;  Location: [facility];  Service: General    CT GUIDED FINE NEEDLE ASPIRATION (ALL INC)  2/28/2020       Family History   Problem Relation Age of Onset    Microcephaly Father     Heart attack Father      I have reviewed and agree with the history as documented  E-Cigarette/Vaping     E-Cigarette/Vaping Substances     Social History     Tobacco Use    Smoking status: Never Smoker    Smokeless tobacco: Never Used   Substance Use Topics    Alcohol use: Yes     Alcohol/week: 2 0 standard drinks     Types: 2 Glasses of wine per week     Comment: a day     Drug use: No        Review of Systems   Constitutional: Negative for activity change, appetite change, chills, diaphoresis, fatigue and fever  Eyes: Negative for visual disturbance  Respiratory: Positive for chest tightness  Negative for cough and shortness of breath  Cardiovascular: Negative for chest pain, palpitations and leg swelling  Gastrointestinal: Negative for abdominal distention, abdominal pain, constipation, diarrhea, nausea and vomiting  Genitourinary: Negative for decreased urine volume, difficulty urinating, dysuria, flank pain, frequency, hematuria and urgency  Musculoskeletal: Negative for arthralgias and myalgias  Skin: Negative for color change  Neurological: Negative for dizziness, weakness, light-headedness and headaches  Psychiatric/Behavioral: Negative for confusion         Physical Exam  ED Triage Vitals   Temperature Pulse Respirations Blood Pressure SpO2   02/28/20 0857 02/28/20 0857 02/28/20 0857 02/28/20 0857 02/28/20 0857   98 2 °F (36 8 °C) (!) 107 18 131/80 96 %      Temp Source Heart Rate Source Patient Position - Orthostatic VS BP Location FiO2 (%)   02/28/20 0857 02/28/20 0857 02/28/20 1427 02/28/20 1427 --   Oral Monitor Lying Right arm       Pain Score       02/28/20 0857       4             Orthostatic Vital Signs  Vitals:    02/28/20 1601 02/28/20 1606 02/28/20 2300 02/29/20 0700   BP: 166/92 151/89 148/88 128/86   Pulse: (!) 107 (!) 110 (!) 106 102   Patient Position - Orthostatic VS:   Lying Lying       Physical Exam   Constitutional: He is oriented to person, place, and time  Vital signs are normal  He appears well-developed and well-nourished  He does not appear ill  No distress  HENT:   Head: Normocephalic and atraumatic  Eyes: Pupils are equal, round, and reactive to light  EOM are normal    Neck: Normal range of motion  Neck supple  Cardiovascular: Normal rate, regular rhythm, intact distal pulses and normal pulses  Pulmonary/Chest: Effort normal and breath sounds normal  No tachypnea  He has no decreased breath sounds  Abdominal: Soft  Normal appearance and bowel sounds are normal  He exhibits no distension  There is no tenderness  There is no rigidity and no rebound  Musculoskeletal: Normal range of motion  Right lower leg: Normal  He exhibits no tenderness and no edema  Left lower leg: Normal  He exhibits no tenderness and no edema  Neurological: He is alert and oriented to person, place, and time  Skin: Skin is warm and dry  Capillary refill takes less than 2 seconds  Psychiatric: He has a normal mood and affect  His behavior is normal    Vitals reviewed        ED Medications  Medications   iohexol (OMNIPAQUE) 350 MG/ML injection (MULTI-DOSE) 100 mL (100 mL Intravenous Given 2/28/20 1145)   midazolam (VERSED) injection (1 mg Intravenous Given 2/28/20 1552)   fentanyl citrate (PF) 100 MCG/2ML (50 mcg Intravenous Given 2/28/20 4232)   diphenhydrAMINE (BENADRYL) injection (25 mg Intravenous Given 2/28/20 1364)       Diagnostic Studies  Results Reviewed     Procedure Component Value Units Date/Time    Troponin I [[id number]]  (Normal) Collected:  02/28/20 1305    Lab Status:  Final result Specimen:  Blood from Arm, Left Updated:  02/28/20 1329     Troponin I <0 02 ng/mL     Troponin I [[id number]]  (Normal) Collected:  02/28/20 1002    Lab Status:  Final result Specimen:  Blood from Arm, Left Updated:  02/28/20 1031     Troponin I <0 02 ng/mL     Comprehensive metabolic panel [[id number]]  (Abnormal) Collected:  02/28/20 1002    Lab Status:  Final result Specimen:  Blood from Arm, Left Updated:  02/28/20 1029     Sodium 138 mmol/L      Potassium 4 0 mmol/L      Chloride 102 mmol/L      CO2 26 mmol/L      ANION GAP 10 mmol/L      BUN 18 mg/dL      Creatinine 1 04 mg/dL      Glucose 126 mg/dL      Calcium 9 3 mg/dL      AST 19 U/L      ALT 43 U/L      Alkaline Phosphatase 73 U/L      Total Protein 7 5 g/dL      Albumin 3 5 g/dL      Total Bilirubin 1 76 mg/dL      eGFR 69 ml/min/1 73sq m     Narrative:       [place] guidelines for Chronic Kidney Disease (CKD):     Stage 1 with normal or high GFR (GFR > 90 mL/min/1 73 square meters)    Stage 2 Mild CKD (GFR = 60-89 mL/min/1 73 square meters)    Stage 3A Moderate CKD (GFR = 45-59 mL/min/1 73 square meters)    Stage 3B Moderate CKD (GFR = 30-44 mL/min/1 73 square meters)    Stage 4 Severe CKD (GFR = 15-29 mL/min/1 73 square meters)    Stage 5 End Stage CKD (GFR <15 mL/min/1 73 square meters)  Note: GFR calculation is accurate only with a steady state creatinine    Lipase [[id number]]  (Normal) Collected:  02/28/20 1002    Lab Status:  Final result Specimen:  Blood from Arm, Left Updated:  02/28/20 1029     Lipase 81 u/L     Magnesium [[id number]]  (Normal) Collected:  02/28/20 1002    Lab Status:  Final result Specimen:  Blood from Arm, Left Updated:  02/28/20 1029     Magnesium 1 6 mg/dL     CBC and differential [[id number]]  (Abnormal) Collected:  02/28/20 1002    Lab Status:  Final result Specimen:  Blood from Arm, Left Updated:  02/28/20 1012     WBC 9 54 Thousand/uL      RBC 4 43 Million/uL      Hemoglobin 14 0 g/dL      Hematocrit 41 9 %      MCV 95 fL      MCH 31 6 pg      MCHC 33 4 g/dL      RDW 14 2 %      MPV 10 2 fL      Platelets 468 Thousands/uL      nRBC 0 /100 WBCs      Neutrophils Relative 68 %      Immat GRANS % 1 %      Lymphocytes Relative 15 %      Monocytes Relative 14 %      Eosinophils Relative 2 %      Basophils Relative 0 %      Neutrophils Absolute 6 51 Thousands/µL      Immature Grans Absolute 0 08 Thousand/uL      Lymphocytes Absolute 1 42 Thousands/µL      Monocytes Absolute 1 35 Thousand/µL      Eosinophils Absolute 0 15 Thousand/µL      Basophils Absolute 0 03 Thousands/µL                  CT guided fine needle aspiration (all inc)   Final Result by Luis Flores MD (03/02 8258)   Impression: Successful CT-guided aspiration of gallbladder fossa fluid collection  Workstation performed: [id number]         PE Study with CT Abdomen and Pelvis with contrast   Final Result by Albert Wynne MD (02/28 1225)      There is no pulmonary embolism  In this patient status post cholecystectomy on 2/2/2020, there is a 4 1 x 1 8 x 1 4 cm air/fluid collection in the cholecystectomy bed (series 4 images 26 through 30 ) While the overall size of the collection is small and not unusual, presence of air is    somewhat concerning for infection  Please correlate clinically and consider follow-up imaging to ensure resolution, or fluid sampling  Again again seen is a 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                 Workstation performed: [id number]               Procedures  ECG 12 Lead Documentation Only  Date/Time: 2/28/2020 9:34 AM  Performed by: Rochell Manger Holter, DO  Authorized by: [place] C Holter, DO Indications / Diagnosis:  Chest pain   ECG reviewed by me, the ED Provider: yes    Patient location:  ED  Previous ECG:     Previous ECG:  Compared to current    Comparison ECG info:  Ectopic atrial rhythm nonspecific ST changes in inferior and anterior leads, inverted T-waves in lateral leads    Similarity:  Changes noted  Interpretation:     Interpretation: abnormal    Rate:     ECG rate assessment: tachycardic    QRS:     QRS axis:  Left  ST segments:     ST segments:  Non-specific  T waves:     T waves: inverted        Conscious Sedation Assessment      Classification Score   ASA Scale Assessment  2-Mild to moderate systemic disease, medically well controlled, with no functional limitation filed at 02/28/2020 1548   Mallampati Classification  Class II: soft palate, uvula, fauces visible - No Difficulty filed at 02/28/2020 1548          ED Course         HEART Risk Score      Most Recent Value   Heart Score Risk Calculator   History  2 Filed at: 02/28/2020 1227   ECG  1 Filed at: 02/28/2020 1227   Age  2 Filed at: 02/28/2020 1227   Risk Factors  2 Filed at: 02/28/2020 1227   Troponin  0 Filed at: 02/28/2020 1227   HEART Score  7 Filed at: 02/28/2020 1227            PERC Rule for PE      Most Recent Value   PERC Rule for PE   Age >=50  1 Filed at: 02/28/2020 0927   HR >=100  1 Filed at: 02/28/2020 0927   O2 Sat on room air < 95%  0 Filed at: 02/28/2020 3019   History of PE or DVT  0 Filed at: 02/28/2020 8526   Recent trauma or surgery  1 Filed at: 02/28/2020 7066   Hemoptysis  0 Filed at: 02/28/2020 5012   Exogenous estrogen  0 Filed at: 02/28/2020 1714   Unilateral leg swelling  0 Filed at: 02/28/2020 7227   PERC Rule for PE Results  3 Filed at: 02/28/2020 9650                Wells' Criteria for PE      Most Recent Value   Wells' Criteria for PE   Clinical signs and symptoms of DVT  0 Filed at: 02/28/2020 1476   PE is primary diagnosis or equally likely  0 Filed at: 02/28/2020 0927   HR >100  1 5 Filed at: 02/28/2020 0927   Immobilization at least 3 days or Surgery in the previous 4 weeks  1 5 Filed at: 02/28/2020 7137   Previous, objectively diagnosed PE or DVT  0 Filed at: 02/28/2020 5373   Hemoptysis  0 Filed at: 02/28/2020 0641   Malignancy with treatment within 6 months or palliative  0 Filed at: 02/28/2020 1794   Wells' Criteria Total  3 Filed at: 02/28/2020 3183            MDM  Number of Diagnoses or Management Options  Diagnosis management comments: Myocardial infarction, abdominal aortic aneurysm, pulmonary embolism, acute pancreatitis, NATALIA, acute gastritis        Amount and/or Complexity of Data Reviewed  Clinical lab tests: reviewed  Tests in the radiology section of CPT®: reviewed  Tests in the medicine section of CPT®: reviewed  Discussion of test results with the performing providers: yes  Decide to obtain previous medical records or to obtain history from someone other than the patient: yes  Obtain history from someone other than the patient: yes  Review and summarize past medical records: yes  Discuss the patient with other providers: yes  Independent visualization of images, tracings, or specimens: yes          Disposition  Final diagnoses:   Acute cholecystitis     Time reflects when diagnosis was documented in both MDM as applicable and the Disposition within this note     Time User Action Codes Description Comment    2/28/2020  3:42 PM Holter, [place] C Add [K81 0] Acute cholecystitis       ED Disposition     ED Disposition Condition Date/Time Comment    Admit Stable Fri Feb 28, 2020 12:48 PM Case was discussed with Dr Wilfredo Aguayo and the patient's admission status was agreed to be Admission Status: inpatient status to the service of Dr Wilfredo Aguayo           Follow-up Information     Follow up With Specialties Details Why Contact Info    Estefania Tsang MD Internal Medicine Follow up  [address] [place] [address]  [address]  [phone]            Discharge Medication List as of 2/29/2020 11:11 AM      CONTINUE these medications which have NOT CHANGED    Details   Acai 500 MG CAPS Take by mouth, Historical Med      apixaban (ELIQUIS) 5 mg Take 1 tablet (5 mg total) by mouth 2 (two) times a day Start on 20, Starting 2020, Sample      atorvastatin (LIPITOR) 10 mg tablet Take 1 tablet (10 mg total) by mouth daily, Starting 2019, Normal      glucosamine-chondroitin 500-400 MG tablet Take 1 tablet by mouth 3 (three) times a day, Historical Med      lisinopril (ZESTRIL) 20 mg tablet Take 1 tablet by mouth daily, Historical Med      Misc Natural Products (TART CHERRY ADVANCED PO) Take by mouth, Historical Med      Multiple Vitamin (DAILY VALUE MULTIVITAMIN) TABS Take 1 tablet by mouth daily, Historical Med      Probiotic Product (PROBIOTIC-10) CAPS Take by mouth, Historical Med      tamsulosin (FLOMAX) 0 4 mg Take 0 4 mg by mouth, Historical Med           Outpatient Discharge Orders   Discharge Diet     Activity as tolerated       PDMP Review     None           ED Provider  Attending physically available and evaluated Carlos Courser  I managed the patient along with the ED Attending      Electronically Signed by         Callie Gann, DO  [phone] Lester Roberson, DO  20

## 2020-02-28 NOTE — ASSESSMENT & PLAN NOTE
· Patient had a laparoscopic cholecystectomy for acute calculous cholecystitis that was overall uncomplicated  He saw surgery 2 weeks ago who cleared him for regular activity/diet  Started with chest pain yesterday  Cardiac work up negative here  CT scan significant for air/fluid collection in the cholecystectomy bed with +/- infection   Not septic  · Admit patient to med/surg under inpatient status   · General Surgery consulted  · For IR drainage  · Hold off on antibiotics   · Pain control   · Hold Eliquis

## 2020-02-28 NOTE — ASSESSMENT & PLAN NOTE
· Patient is tachycardic on admission, but not in A  Fib or other rhythm  He does take Eliquis for pAF with his last dose being this morning   No ectopy noted   · No need for telemetry   · Continue to monitor vitals  · Hold off on Beta Blocker as patient has gotten bradycardic with beta blocker in the past

## 2020-02-28 NOTE — ASSESSMENT & PLAN NOTE
· Patient states that he has had chest pain since yesterday  Reports that it was a discomfort in the center of his chest  Worse with positional change  No other symptoms  EKG here and troponin x 2 negative   Reproducible with palpation   · No further work up needed   · Rest of management per primary problem

## 2020-02-28 NOTE — DISCHARGE INSTRUCTIONS
Abscess/fluid collection Aspiration      WHAT YOU NEED TO KNOW:     Today you underwent a fluid collection/abscess aspiration  Usually the fluid is sent to the lab for culture  You may have some pain associated with the puncture site  The Procedure is performed with Local anesthesia (Lidocaine) and sometimes with local and IV Sedation  After you go Home:    Home care  These tips can help your wound heal:   The wound may drain for the first 2 days  Cover the wound with a clean dry dressing  Change the dressing if it becomes soaked with blood or pus   If a gauze packing was placed inside the abscess pocket, you may be told to remove it yourself  You may do this in the shower  Once the packing is removed, you should wash the area in the shower, or clean the area as directed by your provider  Continue to do this until the skin opening has closed  Make sure you wash your hands after changing the packing or cleaning the wound   If you were prescribed antibiotics, take them as directed until they are all gone   You may use acetaminophen or ibuprofen to control pain, unless another pain medicine was prescribed  If you have liver disease or ever had a stomach ulcer, talk with your doctor before using these medicines  Follow-up care  Follow up with your healthcare provider, or as advised  If a gauze packing was put in your wound, it should be removed in 1 to 2 days  Check your wound every day for any signs that the infection is getting worse  The signs are listed below    When to seek medical advice  Call your healthcare provider right away if any of these occur:   Increasing redness or swelling   Red streaks in the skin leading away from the wound   Increasing local pain or swelling   Continued pus draining from the wound 2 days after treatment   Fever of 100 4ºF (38ºC) or higher, or as directed by your healthcare provider   Abscess  returns when you are at home

## 2020-02-28 NOTE — BRIEF OP NOTE (RAD/CATH)
CT GUIDED FINE NEEDLE ASPIRATION (ALL INC) Procedure Note    PATIENT NAME: Filomena Moseley  : 1945  MRN: [id number]    Pre-op Diagnosis:   1  Acute cholecystitis      Post-op Diagnosis:   1  Acute cholecystitis        Surgeon:   Amy Martinez MD  Assistants:     No qualified resident was available, Resident is only observing    Estimated Blood Loss: minimal     Findings:     CT guided aspiration of gall bladder fossa fluid and air collection  Scant blood type products, likely post surgical fluid aspirated  Marked reduction in the size of the collection  Sample sent for culture       Specimens: aspirate of gallbladder fossa collection    Complications:  none    Anesthesia: Conscious sedation and Cassie Hernandez MD     Date: 2020  Time: 4:08 PM

## 2020-02-28 NOTE — INCIDENTAL FINDINGS
The following findings require follow up:  Radiographic finding   Finding:  Iliac artery aneurysm   Follow up required:  Vascular surgery evaluation   Follow up should be done within 1-2 month(s)    Please notify the following clinician to assist with the follow up:   Dr Valle Heal

## 2020-02-28 NOTE — ED ATTENDING ATTESTATION
2/28/2020  IJune DO, saw and evaluated the patient  I have discussed the patient with the resident/non-physician practitioner and agree with the resident's/non-physician practitioner's findings, Plan of Care, and MDM as documented in the resident's/non-physician practitioner's note, except where noted  All available labs and Radiology studies were reviewed  I was present for key portions of any procedure(s) performed by the resident/non-physician practitioner and I was immediately available to provide assistance  At this point I agree with the current assessment done in the Emergency Department  I have conducted an independent evaluation of this patient a history and physical is as follows:    ED Course     66year old male presents for evaluation chest pressure  Patient states that he 1st noticed a chest pressure sensation last night  He describes it as constant and 5/10  It is unchanged today  He is approximately 4 weeks status post laparoscopic cholecystectomy  He reports the procedure was uncomplicated  He has been tolerating a normal diet  He denies fevers or chills  Patient has been having frequent episodes of palpitations and is currently wearing a Holter monitor to evaluate irregular heartbeat  Patient denies shortness of breath  No diaphoresis  Past medical history:  Tachy-winifred syndrome, hypertension, hyperlipidemia, sick sinus syndrome, recent cholecystectomy    Physical exam:  Vital signs stable, no acute distress, mucous membranes are moist   Neck is supple without JVD  Heart is tachycardic with occasional ectopy and irregularity, no murmur noted  Lungs are clear to auscultation bilaterally  Abdomen is soft  There is no tenderness with deep palpation the right upper quadrant  And surgical incisions are fully healed  Patient has 5/5 strength in all 4 extremities  No lower extremity edema noted  No focal neurologic deficits noted    Plan:   Will check EKG, chest x-ray, lab work, consider admission for cardiac evaluation due to elevated heart score    Critical Care Time  Procedures

## 2020-02-29 VITALS
SYSTOLIC BLOOD PRESSURE: 128 MMHG | HEART RATE: 102 BPM | HEIGHT: 67 IN | OXYGEN SATURATION: 93 % | DIASTOLIC BLOOD PRESSURE: 86 MMHG | WEIGHT: 195.4 LBS | BODY MASS INDEX: 30.67 KG/M2 | RESPIRATION RATE: 17 BRPM | TEMPERATURE: 98.2 F

## 2020-02-29 PROBLEM — R07.9 CHEST PAIN: Status: RESOLVED | Noted: 2020-02-28 | Resolved: 2020-02-29

## 2020-02-29 LAB
ALBUMIN SERPL BCP-MCNC: 2.8 G/DL (ref 3.5–5)
ALP SERPL-CCNC: 62 U/L (ref 46–116)
ALT SERPL W P-5'-P-CCNC: 44 U/L (ref 12–78)
ANION GAP SERPL CALCULATED.3IONS-SCNC: 10 MMOL/L (ref 4–13)
AST SERPL W P-5'-P-CCNC: 23 U/L (ref 5–45)
ATRIAL RATE: 104 BPM
BILIRUB DIRECT SERPL-MCNC: 0.31 MG/DL (ref 0–0.2)
BILIRUB SERPL-MCNC: 1.11 MG/DL (ref 0.2–1)
BUN SERPL-MCNC: 21 MG/DL (ref 5–25)
CALCIUM SERPL-MCNC: 8.5 MG/DL (ref 8.3–10.1)
CHLORIDE SERPL-SCNC: 104 MMOL/L (ref 100–108)
CO2 SERPL-SCNC: 26 MMOL/L (ref 21–32)
CREAT SERPL-MCNC: 0.92 MG/DL (ref 0.6–1.3)
ERYTHROCYTE [DISTWIDTH] IN BLOOD BY AUTOMATED COUNT: 14.3 % (ref 11.6–15.1)
GFR SERPL CREATININE-BSD FRML MDRD: 80 ML/MIN/1.73SQ M
GLUCOSE SERPL-MCNC: 111 MG/DL (ref 65–140)
HCT VFR BLD AUTO: 39.4 % (ref 36.5–49.3)
HGB BLD-MCNC: 12.9 G/DL (ref 12–17)
MCH RBC QN AUTO: 31.4 PG (ref 26.8–34.3)
MCHC RBC AUTO-ENTMCNC: 32.7 G/DL (ref 31.4–37.4)
MCV RBC AUTO: 96 FL (ref 82–98)
P AXIS: -79 DEGREES
PLATELET # BLD AUTO: 155 THOUSANDS/UL (ref 149–390)
PMV BLD AUTO: 9.7 FL (ref 8.9–12.7)
POTASSIUM SERPL-SCNC: 3.9 MMOL/L (ref 3.5–5.3)
PR INTERVAL: 172 MS
PROT SERPL-MCNC: 6.3 G/DL (ref 6.4–8.2)
QRS AXIS: -21 DEGREES
QRSD INTERVAL: 92 MS
QT INTERVAL: 324 MS
QTC INTERVAL: 426 MS
RBC # BLD AUTO: 4.11 MILLION/UL (ref 3.88–5.62)
SODIUM SERPL-SCNC: 140 MMOL/L (ref 136–145)
T WAVE AXIS: 108 DEGREES
VENTRICULAR RATE: 104 BPM
WBC # BLD AUTO: 6.85 THOUSAND/UL (ref 4.31–10.16)

## 2020-02-29 PROCEDURE — 85027 COMPLETE CBC AUTOMATED: CPT | Performed by: PHYSICIAN ASSISTANT

## 2020-02-29 PROCEDURE — 99239 HOSP IP/OBS DSCHRG MGMT >30: CPT | Performed by: PHYSICIAN ASSISTANT

## 2020-02-29 PROCEDURE — 93010 ELECTROCARDIOGRAM REPORT: CPT | Performed by: INTERNAL MEDICINE

## 2020-02-29 PROCEDURE — 80048 BASIC METABOLIC PNL TOTAL CA: CPT | Performed by: INTERNAL MEDICINE

## 2020-02-29 PROCEDURE — 80076 HEPATIC FUNCTION PANEL: CPT | Performed by: INTERNAL MEDICINE

## 2020-02-29 RX ADMIN — LISINOPRIL 20 MG: 20 TABLET ORAL at 08:02

## 2020-02-29 RX ADMIN — ENOXAPARIN SODIUM 40 MG: 40 INJECTION SUBCUTANEOUS at 08:03

## 2020-02-29 NOTE — DISCHARGE SUMMARY
[address] [facility]  Discharge- Paty Hopping 1945, 66 y o  male MRN: [id number]    Unit/Bed#: S -01 Encounter: [id number]    Primary Care Provider: Leydi Ozuna MD   Date and time admitted to hospital: 2/28/2020  8:49 AM        * Status post laparoscopic cholecystectomy  Assessment & Plan  · Patient had a laparoscopic cholecystectomy for acute calculous cholecystitis that was overall uncomplicated  Status post drainage that was mostly bloody material  No polys/bacteria seen on gram stain  · Stable for discharge  · No surgical follow up needed   · Restart Eliquis   · Regular diet     Chest painresolved as of 2/29/2020  Assessment & Plan  · Patient states that he has had chest pain since yesterday  Reports that it was a discomfort in the center of his chest  Worse with positional change  No other symptoms  EKG here and troponin x 2 negative  Reproducible with palpation  No further episodes    · No further work up needed      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[facility])  Assessment & Plan  · Patient is tachycardic on admission, but not in A  Fib or other rhythm  He does take Eliquis for pAF with his last dose being this morning  No ectopy noted   HR stable    · Hold off on Beta Blocker as patient has gotten bradycardic with beta blocker in the past     Essential hypertension  Assessment & Plan  · BP acceptable   · Continue Lisinopril         Discharging Physician / Practitioner: Andrez Corbett PA-C  PCP: Leydi Ozuna MD  Admission Date:   Admission Orders (From admission, onward)     Ordered        02/28/20 1249  Inpatient Admission  Once                   Discharge Date: 02/29/20    Resolved Problems  Date Reviewed: 2/29/2020          Resolved    Chest pain 2/29/2020     Resolved by  Andrez Corbett PA-C          Consultations During Hospital Stay:  · General Surgery - Dr Erin Manley  · IR - Dr Layo Washington     Procedures Performed:   · CTA Chest CT Abdomen Pelvis  · CT FNA    Significant Findings / Test Results: · CTA Chest CT Abdomen Pelvis: There is no pulmonary embolism  In this patient status post cholecystectomy there is a 4 1 x 1 8 x 1 4 cm air/fluid collection in the cholecystectomy bed  Overall size of the collection of small not unusual, presence of air is somewhat concerning for infection  Again seen is a 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  · IR FNA:  See op note for further details    Incidental Findings:   · 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     Test Results Pending at Discharge (will require follow up): · None     Outpatient Tests Requested:  · None    Complications:  None    Reason for Admission: Chest Pain    Hospital Course:     Betsy Diehl is a 66 y o  male patient who originally presented to the hospital on 2/28/2020 due to chest pain  His cardiac workup was negative in the emergency department  CT of the abdomen pelvis was positive for and air-fluid collection and given recent cholecystectomy in general surgery was consulted  He underwent IR drainage with findings of mostly bloody liquid and there was no bacterial findings on gram stain  No further workup was needed and the patient was restarted on his Eliquis and deemed stable for discharge  No surgical follow-up was needed  Seen incidentally on the CT scan was a 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  Patient feels as though he was told about this before and he continues to be aware of it now  He will follow-up his primary care doctor concerning this in the future  The patient, initially admitted to the hospital as inpatient, was discharged earlier than expected given the following: The patient's care was delivered in an efficient manner and was able to be deemed stable for discharge prior to meeting the original 2 midnight expectation       Please see above list of diagnoses and related plan for additional information       Condition at Discharge: stable     Discharge Day Visit / Exam:     Subjective:  Patient denies any further pain overnight  Denies any other changes or complaints  Excited to go home  Vitals: Blood Pressure: 128/86 (02/29/20 0700)  Pulse: 102 (02/29/20 0700)  Temperature: 98 2 °F (36 8 °C) (02/29/20 0700)  Temp Source: Oral (02/29/20 0700)  Respirations: 17 (02/29/20 0700)  Height: 5' 7" (170 2 cm) (02/28/20 1445)  Weight - Scale: 88 6 kg (195 lb 6 4 oz) (02/28/20 1445)  SpO2: 93 % (02/29/20 0700)  Exam:   Physical Exam   Constitutional: He is oriented to person, place, and time  Vital signs are normal  He appears well-developed and well-nourished  Non-toxic appearance  No distress  HENT:   Head: Normocephalic and atraumatic  Mouth/Throat: Mucous membranes are not dry  Eyes: Pupils are equal, round, and reactive to light  Conjunctivae and EOM are normal  No scleral icterus  Pupils are equal    Neck: Neck supple  Cardiovascular: Normal rate, regular rhythm, S1 normal, S2 normal, normal heart sounds and intact distal pulses  Exam reveals no S3 and no S4  No murmur heard  Pulmonary/Chest: Effort normal and breath sounds normal  No accessory muscle usage or stridor  No respiratory distress  He has no decreased breath sounds  He has no wheezes  He has no rhonchi  He has no rales  He exhibits no tenderness  Abdominal: Soft  Bowel sounds are normal  He exhibits no distension and no mass  There is no tenderness  There is no rigidity, no rebound and no guarding  Neurological: He is alert and oriented to person, place, and time  He is not disoriented  He displays no tremor  He displays no seizure activity  Skin: Skin is warm and dry  Discussion with Family: None requested     Discharge instructions/Information to patient and family:   See after visit summary for information provided to patient and family  Provisions for Follow-Up Care:  See after visit summary for information related to follow-up care and any pertinent home health orders        Disposition:     Home    For Discharges to [facility]:   · Not Applicable to this Patient - Not Applicable to this Patient    Planned Readmission: None     Discharge Statement:  I spent 45 minutes discharging the patient  This time was spent on the day of discharge  I had direct contact with the patient on the day of discharge  Greater than 50% of the total time was spent examining patient, answering all patient questions, arranging and discussing plan of care with patient as well as directly providing post-discharge instructions  Additional time then spent on discharge activities  Discharge Medications:  See after visit summary for reconciled discharge medications provided to patient and family        ** Please Note: This note has been constructed using a voice recognition system **

## 2020-02-29 NOTE — PROGRESS NOTES
Progress Note -Surgery PA  Kiko Adam 66 y o  male MRN: [id number]  Unit/Bed#: S -01 Encounter: [id number]    ASSESSMENT/PLAN:  Problem List     * (Principal) Status post laparoscopic cholecystectomy    Idiopathic pericarditis    Paroxysmal atrial flutter (HCC)    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HCC)    Essential hypertension (Chronic)   56-year-old male status post laparoscopic cholecystectomy on February 3, 2020 presents with gallbladder flux fossa fluid and air collection now postop day 1 status post interventional radiology aspiration  Per report " aspirated fluid appeared as "scant blood type products " Gram stain is negative, and patient says the pain has resolved  T bili trending down white blood count normal and LFTs normal   He does continue to be slightly tachycardic  · Encourage out of bed and ambulation  · Pain meds as needed  · Follow-up fluid culture  · Diet as tolerated  · Okay to restart Eliquis     VTE Pharmacologic Prophylaxis: Sequential compression device (Venodyne)  and Enoxaparin (Lovenox)    Subjective/Objective     Subjective:    Pain resolved   No complaints    Objective/Physical Exam: Blood pressure 148/88, pulse (!) 106, temperature 98 6 °F (37 °C), temperature source Oral, resp  rate 18, height 5' 7" (1 702 m), weight 88 6 kg (195 lb 6 4 oz), SpO2 96 %  ,Body mass index is 30 6 kg/m²      General appearance: alert and oriented, in no acute distress  Abdomen: soft, non-tender; bowel sounds normal; no masses,  no organomegaly and Incisions healing well      Current Facility-Administered Medications:     acetaminophen (TYLENOL) tablet 650 mg, 650 mg, Oral, Q6H PRN, Yue Ramos PA-C    atorvastatin (LIPITOR) tablet 10 mg, 10 mg, Oral, Daily With Dinner, Nilo Reynolds PA-C, 10 mg at 02/28/20 1641    enoxaparin (LOVENOX) subcutaneous injection 40 mg, 40 mg, Subcutaneous, Daily, Nilo Melissa PA-C, Stopped at 02/28/20 1639    ibuprofen (MOTRIN) tablet 400 mg, 400 mg, Oral, Q6H PRN, Nilo LYON Shorty Cevallos PA-C    lisinopril (ZESTRIL) tablet 20 mg, 20 mg, Oral, Daily, Nilo Melissa PA-C, 20 mg at 02/28/20 1641    ondansetron (ZOFRAN) injection 4 mg, 4 mg, Intravenous, Q6H PRN, Mahesh Orlando PA-C    sodium chloride 0 9 % infusion, 75 mL/hr, Intravenous, Continuous, Nilo Melissa PA-C, Last Rate: 75 mL/hr at 02/28/20 1641, 75 mL/hr at 02/28/20 1641    tamsulosin (FLOMAX) capsule 0 4 mg, 0 4 mg, Oral, Daily With Dinner, Nilo Londono PA-C, 0 4 mg at 02/28/20 1641       Lab, Imaging and other studies:   Gram Stain Result 02/28/2020 No Polys or Bacteria seen   Preliminary    WBC 02/29/2020 6 85  4 31 - 10 16 Thousand/uL Final    RBC 02/29/2020 4 11  3 88 - 5 62 Million/uL Final    Hemoglobin 02/29/2020 12 9  12 0 - 17 0 g/dL Final    Hematocrit 02/29/2020 39 4  36 5 - 49 3 % Final    MCV 02/29/2020 96  82 - 98 fL Final    MCH 02/29/2020 31 4  26 8 - 34 3 pg Final    MCHC 02/29/2020 32 7  31 4 - 37 4 g/dL Final    RDW 02/29/2020 14 3  11 6 - 15 1 % Final    Platelets 90/46/7386 155  149 - 390 Thousands/uL Final    MPV 02/29/2020 9 7  8 9 - 12 7 fL Final      Ref Range & Units 2/29/20 0552 2/28/20 1002 2/5/20 0327   Total Bilirubin 0 20 - 1 00 mg/dL 1  11High   1  76High   0 60    Bilirubin, Direct 0 00 - 0 20 mg/dL 0  31High       Alkaline Phosphatase 46 - 116 U/L 62  73  60    AST 5 - 45 U/L 23  19 CM 46High  CM   Comment:    Specimen collection should occur prior to Sulfasalazine administration due to the potential for falsely depressed results  ALT 12 - 78 U/L 44  43 CM 46 CM   Comment:    Specimen collection should occur prior to Sulfasalazine administration due to the potential for falsely depressed results      Total Protein 6 4 - 8 2 g/dL 6 3Low   7 5  5 8Low     Albumin 3 5 - 5 0 g/dL 2 8Low   3 5  2 3Low             None    Ref Range & Units 2/29/20 0552 2/28/20 1002 2/5/20 0327   Sodium 136 - 145 mmol/L 140  138  142    Potassium 3 5 - 5 3 mmol/L 3 9  4 0  3 8    Chloride 100 - 108 mmol/L 104  102  107    CO2 21 - 32 mmol/L 26  26  27    ANION GAP 4 - 13 mmol/L 10  10  8    BUN 5 - 25 mg/dL 21  18  26High     Creatinine 0 60 - 1 30 mg/dL 0 92  1 04 CM 1 01 CM   Comment: Standardized to IDMS reference method   Glucose 65 - 140 mg/dL 111  126  CM   Comment:    If the patient is fasting, the ADA then defines impaired fasting glucose as > 100 mg/dL and diabetes as > or equal to 123 mg/dL  Specimen collection should occur prior to Sulfasalazine administration due to the potential for falsely depressed results  Specimen collection should occur prior to Sulfapyridine administration due to the potential for falsely elevated results     Calcium 8 3 - 10 1 mg/dL 8 5  9 3  8 7    eGFR ml/min/1 73sq m 80  69  71

## 2020-02-29 NOTE — ASSESSMENT & PLAN NOTE
· Patient states that he has had chest pain since yesterday  Reports that it was a discomfort in the center of his chest  Worse with positional change  No other symptoms  EKG here and troponin x 2 negative  Reproducible with palpation   No further episodes    · No further work up needed

## 2020-02-29 NOTE — ASSESSMENT & PLAN NOTE
· Patient is tachycardic on admission, but not in A  Fib or other rhythm  He does take Eliquis for pAF with his last dose being this morning  No ectopy noted   HR stable    · Hold off on Beta Blocker as patient has gotten bradycardic with beta blocker in the past

## 2020-02-29 NOTE — ASSESSMENT & PLAN NOTE
· Patient had a laparoscopic cholecystectomy for acute calculous cholecystitis that was overall uncomplicated  Status post drainage that was mostly bloody material  No polys/bacteria seen on gram stain     · Stable for discharge  · No surgical follow up needed   · Restart Eliquis   · Regular diet

## 2020-02-29 NOTE — DISCHARGE INSTR - AVS FIRST PAGE
Dear Jazmine Machuca,     It was our pleasure to care for you here at [facility]  It is our hope that we were always able to exceed the expected standards for your care during your stay  You were hospitalized due to abdominal pain  You were cared for on the 3rd floor by Jo Ann Jolley PA-C under the service of Mychal Sotelo MD with the [facility] who covers for your primary care physician (PCP), Gil Overton MD, while you were hospitalized  If you have any questions or concerns related to this hospitalization, you may contact us at [phone]  For follow up as well as any medication refills, we recommend that you follow up with your primary care physician  A registered nurse will reach out to you by phone within a few days after your discharge to answer any additional questions that you may have after going home  However, at this time we provide for you here, the most important instructions / recommendations at discharge:     · Notable Medication Adjustments -   · None  · Testing Required after Discharge -   · None  · Important follow up information -   · None  · Other Instructions -   · None  · Please review this entire after visit summary as additional general instructions including medication list, appointments, activity, diet, any pertinent wound care, and other additional recommendations from your care team that may be provided for you        Sincerely,     Jo Ann Jolley PA-C

## 2020-03-02 LAB
BACTERIA SPEC BFLD CULT: NO GROWTH
GRAM STN SPEC: NORMAL

## 2020-03-03 NOTE — PHYSICIAN ADVISOR
Current patient class: Inpatient  The patient is currently on Hospital Day: 2 at [facility]      The patient was admitted to the hospital at [phone] on 2/28/20 for the following diagnosis:  Chest pain [R07 9]       There is documentation in the medical record of an expected length of stay of at least 2 midnights  The patient is therefore expected to satisfy the 2 midnight benchmark and given the 2 midnight presumption is appropriate for INPATIENT ADMISSION  Given this expectation of a satisfying stay, CMS instructs us that the patient is most often appropriate for inpatient admission under part A provided medical necessity is documented in the chart  After review of the relevant documentation, labs, vital signs and test results, the patient is appropriate for INPATIENT ADMISSION  Admission to the hospital as an inpatient is a complex decision making process which requires the practitioner to consider the patients presenting complaint, history and physical examination and all relevant testing  With this in mind, in this case, the patient was deemed appropriate for INPATIENT ADMISSION  After review of the documentation and testing available at the time of the admission I concur with this clinical determination of medical necessity  Rationale is as follows: The patient is a 66 yrs old Male who presented to the ED at 2/28/2020  8:49 AM with a chief complaint of Chest Pain (pt states "soreness in chest" since yest afternoon  states given monitor to wear by cardiologist for 2 weeks but did not called them yest  pt states recent gallbladder surg  denies any other symptoms )     Patient admitted with chest discomfort which was increased with movement  It is noted that he had a laparoscopic cholecystectomy 2 weeks ago  A CT of the A/P revealed an air/fluid collection in the cholecystectomy which was concerning for a possible infectious process    The patient was seen by IR and they performed a CT gudied aspiration of the GB fossa  The patient was seen by Surgery and they noted he felt better after the IR procedure and no further care was necessary  The patient was discharged in stable condition after a one night stay and this was faster than initially anticipated  With the need for care for a possible infection in a recent post-op patient, and admission class to the hospital would be considered appropriate          The patients vitals on arrival were ED Triage Vitals   Temperature Pulse Respirations Blood Pressure SpO2   02/28/20 0857 02/28/20 0857 02/28/20 0857 02/28/20 0857 02/28/20 0857   98 2 °F (36 8 °C) (!) 107 18 131/80 96 %      Temp Source Heart Rate Source Patient Position - Orthostatic VS BP Location FiO2 (%)   02/28/20 0857 02/28/20 0857 02/28/20 1427 02/28/20 1427 --   Oral Monitor Lying Right arm       Pain Score       02/28/20 0857       4           Past Medical History:   Diagnosis Date    Atrial tachycardia, paroxysmal (HCC) 01/09/2018    from allscripts chart    Bradycardia 01/09/2018    from allscripts chart    Dyslipidemia     Essential hypertension     Impaired fasting glucose     Premature atrial contractions     Sinus bradycardia-tachycardia syndrome (HCC)      Past Surgical History:   Procedure Laterality Date    CHOLECYSTECTOMY LAPAROSCOPIC N/A 2/3/2020    Procedure: CHOLECYSTECTOMY LAPAROSCOPIC;  Surgeon: Celeste Darling DO;  Location: [facility];  Service: General    CT GUIDED FINE NEEDLE ASPIRATION (ALL INC)  2/28/2020           Consults have been placed to:   IP CONSULT TO ACUTE CARE SURGERY    Vitals:    02/28/20 1601 02/28/20 1606 02/28/20 2300 02/29/20 0700   BP: 166/92 151/89 148/88 128/86   BP Location:   Right arm Right arm   Pulse: (!) 107 (!) 110 (!) 106 102   Resp: 18 18 18 17   Temp:   98 6 °F (37 °C) 98 2 °F (36 8 °C)   TempSrc:   Oral Oral   SpO2: 98% 96% 96% 93%   Weight:       Height:           Most recent labs:    No results for input(s): WBC, HGB, HCT, PLT, K, NA, CALCIUM, BUN, CREATININE, LIPASE, AMYLASE, INR, TROPONINI, CKTOTAL, AST, ALT, ALKPHOS, BILITOT in the last 72 hours  Scheduled Meds:  Continuous Infusions:  No current facility-administered medications for this encounter  PRN Meds:      Surgical procedures (if appropriate):

## 2020-03-09 ENCOUNTER — CLINICAL SUPPORT (OUTPATIENT)
Dept: CARDIOLOGY CLINIC | Facility: CLINIC | Age: 75
End: 2020-03-09
Payer: MEDICARE

## 2020-03-09 ENCOUNTER — TELEPHONE (OUTPATIENT)
Dept: CARDIOLOGY CLINIC | Facility: CLINIC | Age: 75
End: 2020-03-09

## 2020-03-09 DIAGNOSIS — I49.5 TACHY-BRADY SYNDROME (HCC): ICD-10-CM

## 2020-03-09 PROCEDURE — 0298T PR EXT ECG > 48HR TO 21 DAY REVIEW AND INTERPRETATN: CPT | Performed by: INTERNAL MEDICINE

## 2020-03-09 NOTE — TELEPHONE ENCOUNTER
Abnormal reading- 28 % burden abn - afib - avg  - 5 episodes of afib - final report is available for uploading

## 2020-03-10 ENCOUNTER — TELEPHONE (OUTPATIENT)
Dept: CARDIOLOGY CLINIC | Facility: CLINIC | Age: 75
End: 2020-03-10

## 2020-03-10 NOTE — TELEPHONE ENCOUNTER
----- Message from Magi Rich MD sent at 3/9/2020  5:08 PM EDT -----  Regarding: Karla Shrestha results  Please notify patient that I have read the report of the above study and have forwarded it to Dr Shashi Fountain for a 2nd opinion regarding any additional medication that might be helpful  For now, stay on Eliquis

## 2020-03-11 ENCOUNTER — DOCUMENTATION (OUTPATIENT)
Dept: CARDIOLOGY CLINIC | Facility: CLINIC | Age: 75
End: 2020-03-11

## 2020-03-11 NOTE — PROGRESS NOTES
Notified patient regarding results of the Zio patch monitor, which included 20% burden of atrial fibrillation/flutter with longest duration atrial fibrillation 2 hours 48 minutes at average heart rate of 134 bpm   There were five runs of wide complex tachycardia consistent with VT, longest of which was 16 beats at 176 bpm   Patient was asymptomatic throughout  The monitor was discussed with Dr Justin Sanhcez, who felt that we should continue the patient on Eliquis and consider repeating the monitor in approximately six months  I told the patient we could discuss this further at his next office appointment in August, 2020  The patient tells me he feels well and has no cardiopulmonary symptoms  I advised him to continue on Eliquis  We decided not to recommend any anti dysrhythmic or heart rate limiting medication because of his known history of marked bradycardia, which could then be exacerbated and prompt the need for a permanent pacemaker  There did not appear to be a strong indication for catheter ablation at this point because of his asymptomatic status

## 2020-04-02 ENCOUNTER — TELEMEDICINE (OUTPATIENT)
Dept: VASCULAR SURGERY | Facility: CLINIC | Age: 75
End: 2020-04-02
Payer: MEDICARE

## 2020-04-02 VITALS — HEIGHT: 67 IN | WEIGHT: 210 LBS | BODY MASS INDEX: 32.96 KG/M2

## 2020-04-02 DIAGNOSIS — I72.3 ANEURYSM OF RIGHT INTERNAL ILIAC ARTERY (HCC): Primary | ICD-10-CM

## 2020-04-02 PROCEDURE — 99213 OFFICE O/P EST LOW 20 MIN: CPT | Performed by: SURGERY

## 2020-07-29 ENCOUNTER — TELEPHONE (OUTPATIENT)
Dept: CARDIOLOGY CLINIC | Facility: CLINIC | Age: 75
End: 2020-07-29

## 2020-08-12 ENCOUNTER — OFFICE VISIT (OUTPATIENT)
Dept: CARDIOLOGY CLINIC | Facility: CLINIC | Age: 75
End: 2020-08-12
Payer: MEDICARE

## 2020-08-12 VITALS
TEMPERATURE: 98.6 F | SYSTOLIC BLOOD PRESSURE: 114 MMHG | DIASTOLIC BLOOD PRESSURE: 80 MMHG | HEART RATE: 110 BPM | OXYGEN SATURATION: 97 % | HEIGHT: 67 IN | WEIGHT: 197 LBS | BODY MASS INDEX: 30.92 KG/M2

## 2020-08-12 DIAGNOSIS — I49.5 TACHY-BRADY SYNDROME (HCC): ICD-10-CM

## 2020-08-12 DIAGNOSIS — I10 ESSENTIAL HYPERTENSION: ICD-10-CM

## 2020-08-12 DIAGNOSIS — E66.9 CLASS 1 OBESITY: ICD-10-CM

## 2020-08-12 DIAGNOSIS — E78.5 DYSLIPIDEMIA: ICD-10-CM

## 2020-08-12 DIAGNOSIS — I48.92 PAROXYSMAL ATRIAL FLUTTER (HCC): Primary | ICD-10-CM

## 2020-08-12 DIAGNOSIS — R73.01 IMPAIRED FASTING GLUCOSE: ICD-10-CM

## 2020-08-12 PROCEDURE — 99214 OFFICE O/P EST MOD 30 MIN: CPT | Performed by: INTERNAL MEDICINE

## 2020-08-12 PROCEDURE — 93000 ELECTROCARDIOGRAM COMPLETE: CPT | Performed by: INTERNAL MEDICINE

## 2020-08-12 NOTE — PROGRESS NOTES
Office Cardiology Progress Note  Naomie Kidney 66 y o  male MRN: [id number]  08/12/20  10:51 AM      ASSESSMENT:    1  Mildly symptomatic sick sinus syndrome with occasional brief lightheadedness, known  frequent atypical flutter with 3:1 AV block and previously showing   intermittent marked sinus bradycardia   Prior history of asymptomatic tachycardia-bradycardia phenomenon  Seen by Dr Gisselle Hassan  1-1/2 years ago and recommended to stay on Eliquis  2  Chronic PACs with 4 8% frequency on 02/06/2018 Holter monitor and known atrial tachycardia runs of up to 46 beats with fastest rate during  bpm   20% atrial fibrillation/flutter burden on Zio patch 03/09/2020 with five runs of wide complex tachycardia, moderate PACs and rare PVCs with no symptoms  3  Well-controlled essential hypertension and hypertensive heart disease  4  Chronically impaired fasting glucose with most recent fasting blood glucose 119 two days after cholecystectomy  5  History of prior hyperkalemia and azotemia on ACE inhibition, currently suppressed  6  History of right renal mass cryoablation 5/17/12 with previous follow-up at [facility], but patient now discharged from follow-up     7  Controlled dyslipidemia as of 08/29/2019   8  Controlled recurrent diverticulitis  9  Stable BPH and urinary frequency/nocturia  10  Chronic obesity, class I, with a nine pound weight loss  in approximately six months  11  Normal nuclear stress study on 10/4/16 with 70% LVEF    12  Status post  laparoscopic cholecystectomy at [facility] for acute cholecystitis on 02/03/2020 and subsequent IR drainage of collection of blood from cholecystectomy bed on 02/29/2020  13  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  Plan       Patient Instructions     1  Call immediately if increase in frequency or severity of lightheadedness or faintness  2  Will postpone doing Zio patch monitor until above symptoms occur    3  Continue current medications  4  Cardiology follow-up approximately six months with EKG, lipids, CMP  HPI    This 66 y o  male experiences occasional brief periods of lightheadedness in the past several months  He actually had an episode of syncope about six weeks ago following a severe bout of diarrhea and inadequate fluid intake with no similar episodes  The patient is known to have a 3 centimeter right internal iliac artery aneurysm and is scheduled for duplex scanning follow-up at [facility] on 10/06/2020  The patient denies chest pain, palpitations, dyspnea, PND, orthopnea, edema, falls, cough, sputum, wheezing, fever, chills  The patient claims to be adherent to his Eliquis and other medication  The patient had a Zio patch on 03/09/2020 with underlying sinus rhythm, 20% burden of atrial fibrillation/flutter with longest episode 2 hours 48 minutes at average heart rate of 134 bpm   There were five runs of possible VT, longest 16 beats at 176 bpm   There were moderate PACs and rare PVCs with no symptoms or patient triggered recordings  The monitor was discussed with Dr Goldman, who recommended continuation of Eliquis and consideration of repeating the Zio patch  No medication or ablation therapy was indicated in the absence of symptoms at that time  The patient had an admission to [facility] on 02/28/2020 and underwent CT fine needle aspiration of a blood collection in his cholecystectomy bed by IR  He had incidental finding of a 3 2 centimeters right internal iliac artery aneurysm on CT scan  He was subsequently seen by Dr Emilia Osborne who recommended follow-up duplex scanning in October, 2020  The patient is being seen in follow-up of the below listed diagnoses  Encounter Diagnoses   Name Primary?     Paroxysmal atrial flutter (HCC) Yes    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HCC)     Essential hypertension     Dyslipidemia     Class 1 obesity     Impaired fasting glucose Review of Systems    All other systems negative, except as noted in history of present illness    Historical Information   Past Medical History:   Diagnosis Date    Atrial tachycardia, paroxysmal ([address] ) 01/09/2018    from allscripts chart    Bradycardia 01/09/2018    from allscripts chart    Dyslipidemia     Essential hypertension     Impaired fasting glucose     Premature atrial contractions     Sinus bradycardia-tachycardia syndrome (HCC)      Past Surgical History:   Procedure Laterality Date    CHOLECYSTECTOMY LAPAROSCOPIC N/A 2/3/2020    Procedure: CHOLECYSTECTOMY LAPAROSCOPIC;  Surgeon: Lino Pastor DO;  [facility];  Service: General    CT GUIDED FINE NEEDLE ASPIRATION (ALL INC)  2/28/2020     Social History     Substance and Sexual Activity   Alcohol Use Yes    Alcohol/week: 2 0 standard drinks    Types: 2 Glasses of wine per week    Comment: a day      Social History     Substance and Sexual Activity   Drug Use No     Social History     Tobacco Use   Smoking Status Never Smoker   Smokeless Tobacco Never Used       Family History:  Family History   Problem Relation Age of Onset    Microcephaly Father     Heart attack Father          Meds/Allergies     Prior to Admission medications    Medication Sig Start Date End Date Taking?  Authorizing Provider   Acai 500 MG CAPS Take by mouth   Yes Historical Provider, MD   apixaban (ELIQUIS) 5 mg Take 1 tablet (5 mg total) by mouth 2 (two) times a day Start on 2/12/20 2/5/20  Yes [address] [place] [address]   atorvastatin (LIPITOR) 10 mg tablet Take 1 tablet (10 mg total) by mouth daily 12/18/19  Yes Ilya Hackett MD   glucosamine-chondroitin 500-400 MG tablet Take 1 tablet by mouth 3 (three) times a day   Yes Historical Provider, MD   lisinopril (ZESTRIL) 20 mg tablet Take 1 tablet by mouth daily   Yes Historical Provider, MD   Misc Natural Products (TART CHERRY ADVANCED PO) Take by mouth   Yes Historical Provider, MD   Multiple Vitamin (DAILY VALUE MULTIVITAMIN) TABS Take 1 tablet by mouth daily   Yes Historical Provider, MD   Probiotic Product (PROBIOTIC-10) CAPS Take by mouth   Yes Historical Provider, MD   tamsulosin (FLOMAX) 0 4 mg Take 0 4 mg by mouth   Yes Historical Provider, MD       No Known Allergies      Vitals:    08/12/20 0954 08/12/20 1011   BP: 122/88 114/80   BP Location: Left arm Left arm   Patient Position: Sitting Standing   Cuff Size: Standard Standard   Pulse: (!) 54 (!) 110   Temp: 98 6 °F (37 °C)    SpO2: 97%    Weight: 89 4 kg (197 lb)    Height: 5' 7" (1 702 m)        Body mass index is 30 85 kg/m²  9 pound weight loss in six months    Physical Exam:    General Appearance:  Alert, cooperative, no distress, appears stated age and is mildly obese  Head:  Normocephalic, without obvious abnormality, atraumatic   Eyes:  PERRL, conjunctiva/corneas clear, EOM's intact,   both eyes   Ears:  Normal TM's and external ear canals, both ears   Nose: Nares normal, septum midline, mucosa normal, no drainage or sinus tenderness   Throat: Lips, mucosa, and tongue normal; teeth and gums normal   Neck: Supple, symmetrical, trachea midline, no adenopathy, thyroid: not enlarged, symmetric, no tenderness/mass/nodules, no carotid bruit or JVD   Back:   Symmetric, no curvature, ROM normal, no CVA tenderness   Lungs:   Clear to auscultation bilaterally, respirations unlabored   Chest Wall:  No tenderness or deformity   Heart:  Periods of bradycardia interspersed with periods of rapid heart action, S1, S2 normal, no murmur, rub or gallop   Abdomen:   Soft, non-tender, bowel sounds active all four quadrants,  no masses, no organomegaly with mild obesity noted     Extremities: Extremities normal, atraumatic, no cyanosis or edema   Pulses: 2+ and symmetric   Skin: Skin showed normal color, texture, turgor and no rashes or lesions   Lymph nodes: Cervical, supraclavicular, and axillary nodes normal   Neurologic: Normal         Cardiographics    ECG 08/12/20:    Sinus bradycardia at 54 bpm  Minimal LVH voltage  Lateral T-wave inversions  Resolved ectopic atrial tachycardia, new sinus bradycardia, and new T inversions in leads I and aVL compared to 02/12/2020    IMAGING:    No Chest XR results available for this patient            LAB REVIEW:      Lab Results   Component Value Date    SODIUM 140 02/29/2020    K 3 9 02/29/2020     02/29/2020    CO2 26 02/29/2020    BUN 21 02/29/2020    CREATININE 0 92 02/29/2020    GLUF 104 (H) 08/29/2019    CALCIUM 8 5 02/29/2020    AST 23 02/29/2020    ALT 44 02/29/2020    ALKPHOS 62 02/29/2020    EGFR 80 02/29/2020     Lab Results   Component Value Date    CHOLESTEROL 159 08/29/2019    CHOLESTEROL 149 09/17/2018    CHOLESTEROL 154 02/06/2018     Lab Results   Component Value Date    HDL 47 08/29/2019    HDL 49 09/17/2018    HDL 51 02/06/2018       Lab Results   Component Value Date    LDLCALC 93 08/29/2019    LDLCALC 78 09/17/2018    LDLCALC 85 02/06/2018       Lab Results   Component Value Date    TRIG 94 08/29/2019    TRIG 112 09/17/2018    TRIG 88 02/06/2018               Zarina Bell MD

## 2020-08-12 NOTE — PATIENT INSTRUCTIONS
1  Call immediately if increase in frequency or severity of lightheadedness or faintness  2  Will postpone doing Zio patch monitor until above symptoms occur  3  Continue current medications  4  Cardiology follow-up approximately six months with EKG, lipids, CMP

## 2020-08-14 ENCOUNTER — HOSPITAL ENCOUNTER (INPATIENT)
Facility: HOSPITAL | Age: 75
LOS: 2 days | DRG: 309 | End: 2020-08-16
Attending: EMERGENCY MEDICINE | Admitting: INTERNAL MEDICINE
Payer: MEDICARE

## 2020-08-14 ENCOUNTER — APPOINTMENT (EMERGENCY)
Dept: CT IMAGING | Facility: HOSPITAL | Age: 75
DRG: 309 | End: 2020-08-14
Payer: MEDICARE

## 2020-08-14 DIAGNOSIS — I61.9 CEREBRAL PARENCHYMAL HEMORRHAGE (HCC): ICD-10-CM

## 2020-08-14 DIAGNOSIS — I49.5 TACHY-BRADY SYNDROME (HCC): ICD-10-CM

## 2020-08-14 DIAGNOSIS — R55 SYNCOPE: Primary | ICD-10-CM

## 2020-08-14 DIAGNOSIS — S09.90XA INJURY OF HEAD, INITIAL ENCOUNTER: ICD-10-CM

## 2020-08-14 LAB
ALBUMIN SERPL BCP-MCNC: 3.9 G/DL (ref 3.5–5)
ALP SERPL-CCNC: 59 U/L (ref 46–116)
ALT SERPL W P-5'-P-CCNC: 37 U/L (ref 12–78)
ANION GAP SERPL CALCULATED.3IONS-SCNC: 9 MMOL/L (ref 4–13)
APTT PPP: 27 SECONDS (ref 23–37)
AST SERPL W P-5'-P-CCNC: 21 U/L (ref 5–45)
BASOPHILS # BLD AUTO: 0.07 THOUSANDS/ΜL (ref 0–0.1)
BASOPHILS NFR BLD AUTO: 1 % (ref 0–1)
BILIRUB SERPL-MCNC: 0.74 MG/DL (ref 0.2–1)
BUN SERPL-MCNC: 23 MG/DL (ref 5–25)
CALCIUM SERPL-MCNC: 8.5 MG/DL (ref 8.3–10.1)
CHLORIDE SERPL-SCNC: 102 MMOL/L (ref 100–108)
CO2 SERPL-SCNC: 25 MMOL/L (ref 21–32)
CREAT SERPL-MCNC: 1.31 MG/DL (ref 0.6–1.3)
EOSINOPHIL # BLD AUTO: 0.13 THOUSAND/ΜL (ref 0–0.61)
EOSINOPHIL NFR BLD AUTO: 1 % (ref 0–6)
ERYTHROCYTE [DISTWIDTH] IN BLOOD BY AUTOMATED COUNT: 14.5 % (ref 11.6–15.1)
GFR SERPL CREATININE-BSD FRML MDRD: 52 ML/MIN/1.73SQ M
GLUCOSE SERPL-MCNC: 164 MG/DL (ref 65–140)
HCT VFR BLD AUTO: 44.2 % (ref 36.5–49.3)
HGB BLD-MCNC: 14.8 G/DL (ref 12–17)
IMM GRANULOCYTES # BLD AUTO: 0.14 THOUSAND/UL (ref 0–0.2)
IMM GRANULOCYTES NFR BLD AUTO: 1 % (ref 0–2)
INR PPP: 1.1 (ref 0.84–1.19)
LYMPHOCYTES # BLD AUTO: 1.24 THOUSANDS/ΜL (ref 0.6–4.47)
LYMPHOCYTES NFR BLD AUTO: 13 % (ref 14–44)
MCH RBC QN AUTO: 31.9 PG (ref 26.8–34.3)
MCHC RBC AUTO-ENTMCNC: 33.5 G/DL (ref 31.4–37.4)
MCV RBC AUTO: 95 FL (ref 82–98)
MONOCYTES # BLD AUTO: 0.72 THOUSAND/ΜL (ref 0.17–1.22)
MONOCYTES NFR BLD AUTO: 7 % (ref 4–12)
NEUTROPHILS # BLD AUTO: 7.47 THOUSANDS/ΜL (ref 1.85–7.62)
NEUTS SEG NFR BLD AUTO: 77 % (ref 43–75)
NRBC BLD AUTO-RTO: 0 /100 WBCS
PLATELET # BLD AUTO: 150 THOUSANDS/UL (ref 149–390)
PMV BLD AUTO: 9.7 FL (ref 8.9–12.7)
POTASSIUM SERPL-SCNC: 3.8 MMOL/L (ref 3.5–5.3)
PROT SERPL-MCNC: 7.3 G/DL (ref 6.4–8.2)
PROTHROMBIN TIME: 14.3 SECONDS (ref 11.6–14.5)
RBC # BLD AUTO: 4.64 MILLION/UL (ref 3.88–5.62)
SODIUM SERPL-SCNC: 136 MMOL/L (ref 136–145)
TROPONIN I SERPL-MCNC: <0.02 NG/ML
WBC # BLD AUTO: 9.77 THOUSAND/UL (ref 4.31–10.16)

## 2020-08-14 PROCEDURE — 85610 PROTHROMBIN TIME: CPT | Performed by: EMERGENCY MEDICINE

## 2020-08-14 PROCEDURE — 93005 ELECTROCARDIOGRAM TRACING: CPT

## 2020-08-14 PROCEDURE — 80053 COMPREHEN METABOLIC PANEL: CPT | Performed by: EMERGENCY MEDICINE

## 2020-08-14 PROCEDURE — 85730 THROMBOPLASTIN TIME PARTIAL: CPT | Performed by: EMERGENCY MEDICINE

## 2020-08-14 PROCEDURE — 85025 COMPLETE CBC W/AUTO DIFF WBC: CPT | Performed by: EMERGENCY MEDICINE

## 2020-08-14 PROCEDURE — G1004 CDSM NDSC: HCPCS

## 2020-08-14 PROCEDURE — 96374 THER/PROPH/DIAG INJ IV PUSH: CPT

## 2020-08-14 PROCEDURE — 99285 EMERGENCY DEPT VISIT HI MDM: CPT

## 2020-08-14 PROCEDURE — 36415 COLL VENOUS BLD VENIPUNCTURE: CPT | Performed by: EMERGENCY MEDICINE

## 2020-08-14 PROCEDURE — 70450 CT HEAD/BRAIN W/O DYE: CPT

## 2020-08-14 PROCEDURE — 84484 ASSAY OF TROPONIN QUANT: CPT | Performed by: EMERGENCY MEDICINE

## 2020-08-14 PROCEDURE — 99285 EMERGENCY DEPT VISIT HI MDM: CPT | Performed by: EMERGENCY MEDICINE

## 2020-08-14 RX ORDER — LISINOPRIL 10 MG/1
20 TABLET ORAL ONCE
Status: COMPLETED | OUTPATIENT
Start: 2020-08-14 | End: 2020-08-14

## 2020-08-14 RX ORDER — ONDANSETRON 2 MG/ML
4 INJECTION INTRAMUSCULAR; INTRAVENOUS ONCE
Status: COMPLETED | OUTPATIENT
Start: 2020-08-14 | End: 2020-08-14

## 2020-08-14 RX ADMIN — ONDANSETRON 4 MG: 2 INJECTION INTRAMUSCULAR; INTRAVENOUS at 21:52

## 2020-08-14 RX ADMIN — LISINOPRIL 20 MG: 10 TABLET ORAL at 23:20

## 2020-08-15 ENCOUNTER — APPOINTMENT (INPATIENT)
Dept: CT IMAGING | Facility: HOSPITAL | Age: 75
DRG: 309 | End: 2020-08-15
Payer: MEDICARE

## 2020-08-15 PROBLEM — N17.9 AKI (ACUTE KIDNEY INJURY) (HCC): Status: ACTIVE | Noted: 2020-08-15

## 2020-08-15 PROBLEM — R55 SYNCOPE: Status: ACTIVE | Noted: 2020-08-15

## 2020-08-15 LAB
ANION GAP SERPL CALCULATED.3IONS-SCNC: 7 MMOL/L (ref 4–13)
BUN SERPL-MCNC: 18 MG/DL (ref 5–25)
CALCIUM SERPL-MCNC: 8.7 MG/DL (ref 8.3–10.1)
CHLORIDE SERPL-SCNC: 103 MMOL/L (ref 100–108)
CO2 SERPL-SCNC: 26 MMOL/L (ref 21–32)
CREAT SERPL-MCNC: 1.17 MG/DL (ref 0.6–1.3)
ERYTHROCYTE [DISTWIDTH] IN BLOOD BY AUTOMATED COUNT: 14.2 % (ref 11.6–15.1)
GFR SERPL CREATININE-BSD FRML MDRD: 60 ML/MIN/1.73SQ M
GLUCOSE SERPL-MCNC: 125 MG/DL (ref 65–140)
HCT VFR BLD AUTO: 43.4 % (ref 36.5–49.3)
HGB BLD-MCNC: 14.5 G/DL (ref 12–17)
MCH RBC QN AUTO: 32.2 PG (ref 26.8–34.3)
MCHC RBC AUTO-ENTMCNC: 33.4 G/DL (ref 31.4–37.4)
MCV RBC AUTO: 96 FL (ref 82–98)
PLATELET # BLD AUTO: 162 THOUSANDS/UL (ref 149–390)
PMV BLD AUTO: 9.8 FL (ref 8.9–12.7)
POTASSIUM SERPL-SCNC: 4.1 MMOL/L (ref 3.5–5.3)
RBC # BLD AUTO: 4.51 MILLION/UL (ref 3.88–5.62)
SODIUM SERPL-SCNC: 136 MMOL/L (ref 136–145)
TROPONIN I SERPL-MCNC: <0.02 NG/ML
TROPONIN I SERPL-MCNC: <0.02 NG/ML
WBC # BLD AUTO: 9.77 THOUSAND/UL (ref 4.31–10.16)

## 2020-08-15 PROCEDURE — 70450 CT HEAD/BRAIN W/O DYE: CPT

## 2020-08-15 PROCEDURE — G1004 CDSM NDSC: HCPCS

## 2020-08-15 PROCEDURE — 85027 COMPLETE CBC AUTOMATED: CPT | Performed by: PHYSICIAN ASSISTANT

## 2020-08-15 PROCEDURE — 99223 1ST HOSP IP/OBS HIGH 75: CPT | Performed by: INTERNAL MEDICINE

## 2020-08-15 PROCEDURE — 80048 BASIC METABOLIC PNL TOTAL CA: CPT | Performed by: PHYSICIAN ASSISTANT

## 2020-08-15 PROCEDURE — 84484 ASSAY OF TROPONIN QUANT: CPT | Performed by: PHYSICIAN ASSISTANT

## 2020-08-15 RX ORDER — HYDRALAZINE HYDROCHLORIDE 20 MG/ML
10 INJECTION INTRAMUSCULAR; INTRAVENOUS EVERY 6 HOURS PRN
Status: DISCONTINUED | OUTPATIENT
Start: 2020-08-15 | End: 2020-08-16 | Stop reason: HOSPADM

## 2020-08-15 RX ORDER — BACITRACIN, NEOMYCIN, POLYMYXIN B 400; 3.5; 5 [USP'U]/G; MG/G; [USP'U]/G
1 OINTMENT TOPICAL 2 TIMES DAILY
Status: DISCONTINUED | OUTPATIENT
Start: 2020-08-15 | End: 2020-08-16 | Stop reason: HOSPADM

## 2020-08-15 RX ORDER — ACETAMINOPHEN 325 MG/1
650 TABLET ORAL EVERY 6 HOURS PRN
Status: DISCONTINUED | OUTPATIENT
Start: 2020-08-15 | End: 2020-08-16 | Stop reason: HOSPADM

## 2020-08-15 RX ORDER — SODIUM CHLORIDE 9 MG/ML
100 INJECTION, SOLUTION INTRAVENOUS CONTINUOUS
Status: DISCONTINUED | OUTPATIENT
Start: 2020-08-15 | End: 2020-08-16 | Stop reason: HOSPADM

## 2020-08-15 RX ORDER — MECLIZINE HCL 12.5 MG/1
12.5 TABLET ORAL EVERY 8 HOURS SCHEDULED
Status: DISCONTINUED | OUTPATIENT
Start: 2020-08-15 | End: 2020-08-16 | Stop reason: HOSPADM

## 2020-08-15 RX ORDER — TAMSULOSIN HYDROCHLORIDE 0.4 MG/1
0.4 CAPSULE ORAL
Status: DISCONTINUED | OUTPATIENT
Start: 2020-08-15 | End: 2020-08-16 | Stop reason: HOSPADM

## 2020-08-15 RX ORDER — CALCIUM CARBONATE 200(500)MG
1000 TABLET,CHEWABLE ORAL DAILY PRN
Status: DISCONTINUED | OUTPATIENT
Start: 2020-08-15 | End: 2020-08-16 | Stop reason: HOSPADM

## 2020-08-15 RX ORDER — ATORVASTATIN CALCIUM 10 MG/1
10 TABLET, FILM COATED ORAL DAILY
Status: DISCONTINUED | OUTPATIENT
Start: 2020-08-15 | End: 2020-08-16 | Stop reason: HOSPADM

## 2020-08-15 RX ADMIN — APIXABAN 5 MG: 5 TABLET, FILM COATED ORAL at 17:15

## 2020-08-15 RX ADMIN — MECLIZINE 12.5 MG: 12.5 TABLET ORAL at 12:57

## 2020-08-15 RX ADMIN — SODIUM CHLORIDE 100 ML/HR: 0.9 INJECTION, SOLUTION INTRAVENOUS at 11:06

## 2020-08-15 RX ADMIN — BACITRACIN, NEOMYCIN, POLYMYXIN B 1 SMALL APPLICATION: 400; 3.5; 5 OINTMENT TOPICAL at 17:15

## 2020-08-15 RX ADMIN — BACITRACIN, NEOMYCIN, POLYMYXIN B 1 SMALL APPLICATION: 400; 3.5; 5 OINTMENT TOPICAL at 12:56

## 2020-08-15 RX ADMIN — TAMSULOSIN HYDROCHLORIDE 0.4 MG: 0.4 CAPSULE ORAL at 17:15

## 2020-08-15 RX ADMIN — ATORVASTATIN CALCIUM 10 MG: 10 TABLET, FILM COATED ORAL at 09:07

## 2020-08-15 RX ADMIN — MECLIZINE 12.5 MG: 12.5 TABLET ORAL at 04:32

## 2020-08-15 RX ADMIN — APIXABAN 5 MG: 5 TABLET, FILM COATED ORAL at 09:07

## 2020-08-15 RX ADMIN — SODIUM CHLORIDE 100 ML/HR: 0.9 INJECTION, SOLUTION INTRAVENOUS at 21:03

## 2020-08-15 RX ADMIN — SODIUM CHLORIDE 100 ML/HR: 0.9 INJECTION, SOLUTION INTRAVENOUS at 00:54

## 2020-08-15 RX ADMIN — MECLIZINE 12.5 MG: 12.5 TABLET ORAL at 21:02

## 2020-08-15 NOTE — H&P
H&P- Antonia Toby 1945, 66 y o  male MRN: [id number]  Unit/Bed#: S -01 Encounter: [id number]  Primary Care Provider: Dawn Edwards MD   Date and time admitted to hospital: 8/14/2020  9:26 PM    * Syncope  Assessment & Plan  · Patient with syncopal episode after urination  He states that he went to the bathroom to urinate and after finishing urination he became lightheaded and syncopized falling backwards and striking his head on the washing machine  Patient states that he is currently on Eliquis  · CT scan of the head shows 4 to 5 mm area of hyperdensity in the left hamlet (axial image 10, series 2), could represent an area of mineralization, petechial hemorrhaging or possibly a cavernous malformation, traumatic hemorrhage is felt to be unlikely  Small hematoma back of scalp noted  · ED provider discuss case with radiologist who felt low likelihood for bleed  · Patient's neuro exam is nonfocal   Nevertheless for completeness Will re-scanned has head in approximately 6 hours to rule out any kind of bleed given that he is on Eliquis with head trauma  · Differential diagnosis includes cardiogenic syncope verses vasovagal versus orthostatic verses seizure  · Likely vasovagal versus orthostatic has patient did have symptoms of lightheadedness prior to  · Will monitor patient on telemetry for 24 hours  · Orthostatic vital signs  · Q 4 neuro checks given possibility of head bleed  · Check echo    CHRISTOPHER (acute kidney injury) ([facility] [address] )  Assessment & Plan  · Acute kidney injury present on admission with creatinine 1 31   · Baseline appears to be around 1   · Likely secondary to poor p o  Intake  · IV fluid  · Avoid nephrotoxins and systemic hypotension  · Will hold lisinopril at this time    Paroxysmal atrial flutter (HCC)  Assessment & Plan  · Heart rates controlled    · Not on beta-blocker secondary to tachy-winifred syndrome and significant bradycardia when on beta-blocker in the past   · Anticoagulated with Eliquis  · Monitor heart rates    Essential hypertension  Assessment & Plan  · Patient has been significantly hypertensive since admission  · He is currently only on lisinopril for blood pressure, however this will have to be held in the setting of acute kidney injury  · Can add p r n  Hydralazine for blood pressures greater than 874 systolic  · Monitor blood pressure  VTE Prophylaxis: Heparin  / sequential compression device   Code Status: full  POLST: There is no POLST form on file for this patient (pre-hospital)  Discussion with family: patient     Anticipated Length of Stay:  Patient will be admitted on an Inpatient basis with an anticipated length of stay of  > 2 midnights  Justification for Hospital Stay: syncope     Total Time for Visit, including Counseling / Coordination of Care: 1 hour  Greater than 50% of this total time spent on direct patient counseling and coordination of care  Chief Complaint:   Syncope     History of Present Illness:    Aaron To is a 66 y o  male who presents with syncope  He has a past medical history significant for essential hypertension, tachy-winifred syndrome, atrial fibrillation  He presents to the emergency department for a syncopal episode which happened earlier today  Patient states that he went to the bathroom to urinate, and after urination felt lightheaded fell back in his head on the washing machine  Patient is currently anticoagulated on Eliquis  He presented to the emergency department as a trauma alert  CT scan of the head showed  A moderate to large posterior left parietal occipital scalp hematoma  Interestingly, a 4-5 mm area of hypodensity in the left hamlet possible representing mineralization versus petechial hemorrhage, however radiology thought low likelihood of traumatic hemorrhage  Patient denies any seizure-like activity  He denies any tongue biting  He states that he had no postictal   That he knows of    He denies any fevers, chills, chest pain, or shortness of breath  He states that he is having a slight residual headache, and that he is still slightly dizzy  Review of Systems:    Review of Systems   Constitutional: Negative for chills, fatigue, fever and unexpected weight change  Respiratory: Negative for cough, chest tightness and shortness of breath  Cardiovascular: Negative for chest pain and palpitations  Gastrointestinal: Negative for abdominal pain, diarrhea, nausea and vomiting  Genitourinary: Negative for decreased urine volume, dysuria, frequency and urgency  Musculoskeletal: Negative for arthralgias  Neurological: Positive for syncope, light-headedness and headaches  Negative for dizziness  All other systems reviewed and are negative  Past Medical and Surgical History:     Past Medical History:   Diagnosis Date    Atrial tachycardia, paroxysmal ([facility] [address] ) 01/09/2018    from allscripts chart    Bradycardia 01/09/2018    from allscripts chart    Dyslipidemia     Essential hypertension     Impaired fasting glucose     Premature atrial contractions     Sinus bradycardia-tachycardia syndrome ([facility] )        Past Surgical History:   Procedure Laterality Date    CHOLECYSTECTOMY LAPAROSCOPIC N/A 2/3/2020    Procedure: CHOLECYSTECTOMY LAPAROSCOPIC;  Surgeon: Gabi Nguyen DO;  [facility];  Service: General    CT GUIDED FINE NEEDLE ASPIRATION (ALL INC)  2/28/2020       Meds/Allergies:    Prior to Admission medications    Medication Sig Start Date End Date Taking?  Authorizing Provider   apixaban (ELIQUIS) 5 mg Take 1 tablet (5 mg total) by mouth 2 (two) times a day Start on 2/12/20 2/5/20  Yes [address] [place] [address]   atorvastatin (LIPITOR) 10 mg tablet Take 1 tablet (10 mg total) by mouth daily 12/18/19  Yes Alexandria Banerjee MD   glucosamine-chondroitin 500-400 MG tablet Take 1 tablet by mouth 3 (three) times a day   Yes Historical Provider, MD   lisinopril (ZESTRIL) 20 mg tablet Take 1 tablet by mouth daily   Yes Historical Provider, MD   Misc Natural Products (TART CHERRY ADVANCED PO) Take by mouth   Yes Historical Provider, MD   Multiple Vitamin (DAILY VALUE MULTIVITAMIN) TABS Take 1 tablet by mouth daily   Yes Historical Provider, MD   Probiotic Product (PROBIOTIC-10) CAPS Take by mouth   Yes Historical Provider, MD   tamsulosin (FLOMAX) 0 4 mg Take 0 4 mg by mouth   Yes Historical Provider, MD   Acai 500 MG CAPS Take by mouth    Historical Provider, MD     I have reviewed home medications with patient personally  Allergies: No Known Allergies    Social History:     Marital Status: Single   Occupation: unknown   Patient Pre-hospital Living Situation: lives at home   Patient Pre-hospital Level of Mobility: ambulates   Patient Pre-hospital Diet Restrictions: none   Substance Use History:   Social History     Substance and Sexual Activity   Alcohol Use Yes    Alcohol/week: 2 0 standard drinks    Types: 2 Glasses of wine per week    Comment: a day      Social History     Tobacco Use   Smoking Status Never Smoker   Smokeless Tobacco Never Used     Social History     Substance and Sexual Activity   Drug Use No       Family History:    Family History   Problem Relation Age of Onset    Microcephaly Father     Heart attack Father        Physical Exam:     Vitals:   Blood Pressure: (!) 176/90 (08/15/20 0003)  Pulse: (!) 106 (08/15/20 0001)  Temperature: 98 2 °F (36 8 °C) (08/15/20 0001)  Temp Source: Oral (08/15/20 0001)  Respirations: 18 (08/15/20 0001)  Height: 5' 7" (170 2 cm) (08/15/20 0001)  Weight - Scale: 88 kg (194 lb 0 1 oz) (08/15/20 0001)  SpO2: 97 % (08/15/20 0001)    Physical Exam  Constitutional:       General: He is not in acute distress  HENT:      Head: Normocephalic  Laceration present  No abrasion  Comments: Patient has no midline or spinal tenderness to palpation along cervical, thoracic, or lumbar spine    He has full range of motion is neck with extension flexion left and right deviation  Mouth/Throat:      Mouth: Mucous membranes are moist       Pharynx: Oropharynx is clear  No oropharyngeal exudate  Eyes:      General:         Right eye: No discharge  Left eye: No discharge  Conjunctiva/sclera: Conjunctivae normal       Pupils: Pupils are equal, round, and reactive to light  Neck:      Musculoskeletal: Neck supple  No muscular tenderness  Cardiovascular:      Rate and Rhythm: Regular rhythm  Tachycardia present  Pulses: Normal pulses  Heart sounds: Normal heart sounds  No murmur  Pulmonary:      Effort: Pulmonary effort is normal  No respiratory distress  Breath sounds: Normal breath sounds  No wheezing or rales  Abdominal:      General: Abdomen is flat  There is no distension  Palpations: Abdomen is soft  Tenderness: There is no abdominal tenderness  Musculoskeletal: Normal range of motion  Right lower leg: No edema  Left lower leg: No edema  Skin:     General: Skin is warm and dry  Capillary Refill: Capillary refill takes less than 2 seconds  Coloration: Skin is not jaundiced  Neurological:      General: No focal deficit present  Mental Status: He is alert and oriented to person, place, and time  Cranial Nerves: No cranial nerve deficit  Comments: Neuro exam grossly nonfocal   Cranial nerves 2-12 grossly intact  Finger-nose test normal    strength normal   Strength 5/5 in upper lower extremities bilaterally  Patient is alert and oriented x3  Speech is clear without any slurring  He is able to follow commands appropriately  Negative pronator drift and Romberg  He does have slight nystagmus on exam   Psychiatric:         Mood and Affect: Mood normal            Additional Data:     Lab Results: I have personally reviewed pertinent reports        Results from last 7 days   Lab Units 08/14/20  2137   WBC Thousand/uL 9 77   HEMOGLOBIN g/dL 14 8   HEMATOCRIT % 44 2   PLATELETS Thousands/uL 150   NEUTROS PCT % 77*   LYMPHS PCT % 13*   MONOS PCT % 7   EOS PCT % 1     Results from last 7 days   Lab Units 08/14/20  2209   SODIUM mmol/L 136   POTASSIUM mmol/L 3 8   CHLORIDE mmol/L 102   CO2 mmol/L 25   BUN mg/dL 23   CREATININE mg/dL 1 31*   ANION GAP mmol/L 9   CALCIUM mg/dL 8 5   ALBUMIN g/dL 3 9   TOTAL BILIRUBIN mg/dL 0 74   ALK PHOS U/L 59   ALT U/L 37   AST U/L 21   GLUCOSE RANDOM mg/dL 164*     Results from last 7 days   Lab Units 08/14/20  2209   INR  1 10                   Imaging: I have personally reviewed pertinent reports  CT head without contrast   Final Result by Keon Cervantes DO (08/14 2232)      Moderate to large posterior left parieto-occipital scalp hematoma but no calvarial fracture  4 to 5 mm area of hyperdensity in the left hamlet (axial image 10, series 2), could represent an area of mineralization, petechial hemorrhaging or possibly a cavernous malformation, traumatic hemorrhage is felt to be unlikely  Other findings as above  Findings discussed with Dr Devorah Hernandez by Dr Eric King at 10:25 PM on 8/14/2020  Workstation performed: [id number]         CT head wo contrast    (Results Pending)       EKG, Pathology, and Other Studies Reviewed on Admission:   · EKG: no ischemic changes  Sinus tachycardia with no ectopy     Allscripts / Epic Records Reviewed: Yes     ** Please Note: This note has been constructed using a voice recognition system   **

## 2020-08-15 NOTE — ED PROVIDER NOTES
History  Chief Complaint   Patient presents with    Head Injury     Reynold sustained an unwitness fall approx 1 1/2hrs prior  (+) LOC and (+) head strike to back of head on washer  Patient takes Eliquis daily  75-year-old male presents today for evaluation after a syncopal episode just prior to arrival   States he felt lightheaded and dizzy, lost consciousness, falling backwards and hit his head on the washing machine  No neurologic deficit  Is alert awake and oriented right now on arrival   Takes Eliquis  Denies other injury  History provided by:  Patient  Syncope   Episode history:  Single  Most recent episode: Today  Progression:  Resolved  Chronicity:  New  Witnessed: no    Associated symptoms: no chest pain, no confusion, no difficulty breathing, no dizziness, no fever, no headaches, no rectal bleeding and no shortness of breath        Prior to Admission Medications   Prescriptions Last Dose Informant Patient Reported? Taking?    Acai 500 MG CAPS  Self Yes No   Sig: Take by mouth   Misc Natural Products (TART CHERRY ADVANCED PO) 8/14/2020 at Unknown time Self Yes Yes   Sig: Take by mouth   Multiple Vitamin (DAILY VALUE MULTIVITAMIN) TABS 8/14/2020 at Unknown time Self Yes Yes   Sig: Take 1 tablet by mouth daily   Probiotic Product (PROBIOTIC-10) CAPS 8/14/2020 at Unknown time Self Yes Yes   Sig: Take by mouth   apixaban (ELIQUIS) 5 mg 8/14/2020 at 0900 Self No Yes   Sig: Take 1 tablet (5 mg total) by mouth 2 (two) times a day Start on 2/12/20   atorvastatin (LIPITOR) 10 mg tablet 8/13/2020 at Unknown time Self No Yes   Sig: Take 1 tablet (10 mg total) by mouth daily   glucosamine-chondroitin 500-400 MG tablet 8/14/2020 at Unknown time Self Yes Yes   Sig: Take 1 tablet by mouth 3 (three) times a day   lisinopril (ZESTRIL) 20 mg tablet 8/13/2020 at Unknown time Self Yes Yes   Sig: Take 1 tablet by mouth daily   tamsulosin (FLOMAX) 0 4 mg 8/13/2020 at Unknown time Self Yes Yes   Sig: Take 0 4 mg by mouth      Facility-Administered Medications: None       Past Medical History:   Diagnosis Date    Atrial tachycardia, paroxysmal ([address] ) 01/09/2018    from allscripts chart    Bradycardia 01/09/2018    from allscripts chart    Dyslipidemia     Essential hypertension     Impaired fasting glucose     Premature atrial contractions     [facility] )        Past Surgical History:   Procedure Laterality Date    CHOLECYSTECTOMY LAPAROSCOPIC N/A 2/3/2020    Procedure: CHOLECYSTECTOMY LAPAROSCOPIC;  Surgeon: Lino Pastor DO;  Location: [facility];  Service: General    CT GUIDED FINE NEEDLE ASPIRATION (ALL INC)  2/28/2020       Family History   Problem Relation Age of Onset    Microcephaly Father     Heart attack Father      I have reviewed and agree with the history as documented  E-Cigarette/Vaping    E-Cigarette Use Never User      E-Cigarette/Vaping Substances    Nicotine No     THC No     CBD No     Flavoring No     Other No     Unknown No      Social History     Tobacco Use    Smoking status: Never Smoker    Smokeless tobacco: Never Used   Substance Use Topics    Alcohol use: Yes     Alcohol/week: 2 0 standard drinks     Types: 2 Glasses of wine per week     Comment: a day     Drug use: No       Review of Systems   Constitutional: Negative for chills, fatigue and fever  HENT: Negative for postnasal drip, sore throat and trouble swallowing  Eyes: Negative for visual disturbance  Respiratory: Negative for chest tightness and shortness of breath  Cardiovascular: Positive for syncope  Negative for chest pain  Gastrointestinal: Negative for abdominal pain  Genitourinary: Negative for dysuria  Musculoskeletal: Negative for back pain  Skin: Negative for rash  Allergic/Immunologic: Negative for immunocompromised state  Neurological: Negative for dizziness, light-headedness and headaches  Psychiatric/Behavioral: Negative for confusion  Physical Exam  Physical Exam  Vitals signs and nursing note reviewed  Constitutional:       Appearance: He is well-developed  HENT:      Head:      Comments: Small laceration on the occipital scalp  Bleeding controlled     Mouth/Throat:      Mouth: Mucous membranes are moist       Pharynx: Uvula midline  Tonsils: No tonsillar exudate  Eyes:      Pupils: Pupils are equal, round, and reactive to light  Neck:      Musculoskeletal: Normal range of motion and neck supple  Cardiovascular:      Rate and Rhythm: Normal rate and regular rhythm  Pulmonary:      Effort: Pulmonary effort is normal       Breath sounds: Normal breath sounds  Abdominal:      General: Bowel sounds are normal       Palpations: Abdomen is soft  Tenderness: There is no abdominal tenderness  There is no guarding or rebound  Musculoskeletal:         General: No tenderness or deformity  Skin:     General: Skin is warm and dry  Capillary Refill: Capillary refill takes less than 2 seconds  Neurological:      General: No focal deficit present  Mental Status: He is alert and oriented to person, place, and time  Comments: Patient moving all extremities equally, no focal neuro deficits noted         Psychiatric:         Mood and Affect: Mood normal          Behavior: Behavior normal          Vital Signs  ED Triage Vitals [08/14/20 2127]   Temperature Pulse Respirations Blood Pressure SpO2   98 9 °F (37 2 °C) 100 20 (!) 195/111 97 %      Temp Source Heart Rate Source Patient Position - Orthostatic VS BP Location FiO2 (%)   Oral Monitor Sitting Right arm --      Pain Score       No Pain           Vitals:    08/14/20 2215 08/14/20 2230 08/14/20 2245 08/14/20 2300   BP: (!) 172/103 (!) 168/101 (!) 161/105 (!) 163/103   Pulse: 104 102 102 102   Patient Position - Orthostatic VS:    Sitting         Visual Acuity  Visual Acuity      Most Recent Value   L Pupil Size (mm)  3   R Pupil Size (mm)  3          ED Medications  Medications   lisinopril (ZESTRIL) tablet 20 mg (has no administration in time range)   ondansetron (ZOFRAN) injection 4 mg (4 mg Intravenous Given 8/14/20 2152)       Diagnostic Studies  Results Reviewed     Procedure Component Value Units Date/Time    Troponin I [[id number]]  (Normal) Collected:  08/14/20 2209    Lab Status:  Final result Specimen:  Blood from Arm, Right Updated:  08/14/20 2254     Troponin I <0 02 ng/mL     Comprehensive metabolic panel [[id number]]  (Abnormal) Collected:  08/14/20 2209    Lab Status:  Final result Specimen:  Blood from Arm, Right Updated:  08/14/20 2252     Sodium 136 mmol/L      Potassium 3 8 mmol/L      Chloride 102 mmol/L      CO2 25 mmol/L      ANION GAP 9 mmol/L      BUN 23 mg/dL      Creatinine 1 31 mg/dL      Glucose 164 mg/dL      Calcium 8 5 mg/dL      AST 21 U/L      ALT 37 U/L      Alkaline Phosphatase 59 U/L      Total Protein 7 3 g/dL      Albumin 3 9 g/dL      Total Bilirubin 0 74 mg/dL      eGFR 52 ml/min/1 73sq m     Narrative:       [place] guidelines for Chronic Kidney Disease (CKD):     Stage 1 with normal or high GFR (GFR > 90 mL/min/1 73 square meters)    Stage 2 Mild CKD (GFR = 60-89 mL/min/1 73 square meters)    Stage 3A Moderate CKD (GFR = 45-59 mL/min/1 73 square meters)    Stage 3B Moderate CKD (GFR = 30-44 mL/min/1 73 square meters)    Stage 4 Severe CKD (GFR = 15-29 mL/min/1 73 square meters)    Stage 5 End Stage CKD (GFR <15 mL/min/1 73 square meters)  Note: GFR calculation is accurate only with a steady state creatinine    Protime-INR [[id number]]  (Normal) Collected:  08/14/20 2209    Lab Status:  Final result Specimen:  Blood from Arm, Right Updated:  08/14/20 2233     Protime 14 3 seconds      INR 1 10    APTT [[id number]]  (Normal) Collected:  08/14/20 2209    Lab Status:  Final result Specimen:  Blood from Arm, Right Updated:  08/14/20 2233     PTT 27 seconds     CBC and differential [[id number]] (Abnormal) Collected:  08/14/20 2137    Lab Status:  Final result Specimen:  Blood from Arm, Left Updated:  08/14/20 2150     WBC 9 77 Thousand/uL      RBC 4 64 Million/uL      Hemoglobin 14 8 g/dL      Hematocrit 44 2 %      MCV 95 fL      MCH 31 9 pg      MCHC 33 5 g/dL      RDW 14 5 %      MPV 9 7 fL      Platelets 888 Thousands/uL      nRBC 0 /100 WBCs      Neutrophils Relative 77 %      Immat GRANS % 1 %      Lymphocytes Relative 13 %      Monocytes Relative 7 %      Eosinophils Relative 1 %      Basophils Relative 1 %      Neutrophils Absolute 7 47 Thousands/µL      Immature Grans Absolute 0 14 Thousand/uL      Lymphocytes Absolute 1 24 Thousands/µL      Monocytes Absolute 0 72 Thousand/µL      Eosinophils Absolute 0 13 Thousand/µL      Basophils Absolute 0 07 Thousands/µL                  CT head without contrast   Final Result by Juan Manuel Rosales DO (08/14 2232)      Moderate to large posterior left parieto-occipital scalp hematoma but no calvarial fracture  4 to 5 mm area of hyperdensity in the left hamlet (axial image 10, series 2), could represent an area of mineralization, petechial hemorrhaging or possibly a cavernous malformation, traumatic hemorrhage is felt to be unlikely  Other findings as above  Findings discussed with Dr Mayte Wallace by Dr Brianna Mon at 10:25 PM on 8/14/2020  Workstation performed: [id number]                    Procedures  ECG 12 Lead Documentation Only    Date/Time: 8/14/2020 9:44 PM  Performed by: Priscilla Álvarez DO  Authorized by: Christel Anderson DO     Indications / Diagnosis:  Syncope  ECG reviewed by me, the ED Provider: yes    Patient location:  ED  Previous ECG:     Previous ECG:  Compared to current  Comments:      Biphasic P-wave, possible ectopic atrial rhythm at 97 beats per minute  Leftward axis, slow R progression, nonspecific ST T wave abnormalities  QTC is normal   No significant change compared to prior from 02/28/2020               ED Course                                             MDM  Number of Diagnoses or Management Options  Injury of head, initial encounter:   Syncope: new and requires workup  Diagnosis management comments: 11:11 PM  CT head does not show acute traumatic injury  Moderate to large posterior left parieto-occipital scalp hematoma but no calvarial fracture  4 to 5 mm area of hyperdensity in the left hamlet (axial image 10, series 2), could represent an area of mineralization, petechial hemorrhaging or possibly a cavernous malformation, traumatic hemorrhage is felt to be unlikely  I do not think the patient requires transfer to [facility] for a possible intracranial hemorrhage at this time based on the above reading  Discussed the case with [facility] who agrees to admit with frequent neuro checks and a repeat CT head in 4-6 hours  Patient was reevaluated by me at 2316 and was found to have a normal neurological exam and reports he is feeling much better  I examined his scalp wound and its really a series of superficial abrasions, none requiring repair  Bleeding is controlled                                   Amount and/or Complexity of Data Reviewed  Clinical lab tests: ordered and reviewed  Tests in the radiology section of CPT®: ordered and reviewed  Tests in the medicine section of CPT®: ordered and reviewed  Review and summarize past medical records: yes  Discuss the patient with other providers: yes  Independent visualization of images, tracings, or specimens: yes    Risk of Complications, Morbidity, and/or Mortality  Presenting problems: high  Diagnostic procedures: high  Management options: high    Patient Progress  Patient progress: stable        Disposition  Final diagnoses:   Syncope   Injury of head, initial encounter     Time reflects when diagnosis was documented in both MDM as applicable and the Disposition within this note     Time User Action Codes Description Comment    8/14/2020  9:46 PM Nicol Tamez Add [R55] Syncope     8/14/2020 11:14 PM [employer] Add [S09 [id number]] Injury of head, initial encounter       ED Disposition     ED Disposition Condition Date/Time Comment    Admit Stable Fri Aug 14, 2020 11:14 PM Case was discussed with [facility] and the patient's admission status was agreed to be Admission Status: inpatient status to the service of Dr Kristin Paz   Follow-up Information    None         Patient's Medications   Discharge Prescriptions    No medications on file     No discharge procedures on file      PDMP Review     None          ED Provider  Electronically Signed by           Og Fountain DO  08/14/20 4528

## 2020-08-15 NOTE — PLAN OF CARE
Problem: SKIN/TISSUE INTEGRITY - ADULT  Goal: Skin integrity remains intact  Description: INTERVENTIONS  - Identify patients at risk for skin breakdown  - Assess and monitor skin integrity  - Assess and monitor nutrition and hydration status  - Monitor labs (i e  albumin)  - Assess for incontinence   - Turn and reposition patient  - Assist with mobility/ambulation  - Relieve pressure over bony prominences  - Avoid friction and shearing  - Provide appropriate hygiene as needed including keeping skin clean and dry  - Evaluate need for skin moisturizer/barrier cream  - Collaborate with interdisciplinary team (i e  Nutrition, Rehabilitation, etc )   - Patient/family teaching  Outcome: Progressing     Problem: MUSCULOSKELETAL - ADULT  Goal: Maintain or return mobility to safest level of function  Description: INTERVENTIONS:  - Assess patient's ability to carry out ADLs; assess patient's baseline for ADL function and identify physical deficits which impact ability to perform ADLs (bathing, care of mouth/teeth, toileting, grooming, dressing, etc )  - Assess/evaluate cause of self-care deficits   - Assess range of motion  - Assess patient's mobility  - Assess patient's need for assistive devices and provide as appropriate  - Encourage maximum independence but intervene and supervise when necessary  - Involve family in performance of ADLs  - Assess for home care needs following discharge   - Consider OT consult to assist with ADL evaluation and planning for discharge  - Provide patient education as appropriate  Outcome: Progressing

## 2020-08-15 NOTE — ASSESSMENT & PLAN NOTE
· Patient has been significantly hypertensive since admission  · He is currently only on lisinopril for blood pressure, however this will have to be held in the setting of acute kidney injury  · Can add p r n  Hydralazine for blood pressures greater than 184 systolic  · Monitor blood pressure

## 2020-08-15 NOTE — ASSESSMENT & PLAN NOTE
· Acute kidney injury present on admission with creatinine 1 31   · Baseline appears to be around 1   · Likely secondary to poor p o  Intake  · IV fluid  · Avoid nephrotoxins and systemic hypotension    · Will hold lisinopril at this time

## 2020-08-15 NOTE — ASSESSMENT & PLAN NOTE
· Patient with syncopal episode after urination  He states that he went to the bathroom to urinate and after finishing urination he became lightheaded and syncopized falling backwards and striking his head on the washing machine  Patient states that he is currently on Eliquis  · CT scan of the head shows 4 to 5 mm area of hyperdensity in the left hamlet (axial image 10, series 2), could represent an area of mineralization, petechial hemorrhaging or possibly a cavernous malformation, traumatic hemorrhage is felt to be unlikely  Small hematoma back of scalp noted  · ED provider discuss case with radiologist who felt low likelihood for bleed  · Patient's neuro exam is nonfocal   Nevertheless for completeness Will re-scanned has head in approximately 6 hours to rule out any kind of bleed given that he is on Eliquis with head trauma  · Differential diagnosis includes cardiogenic syncope verses vasovagal versus orthostatic verses seizure  · Likely vasovagal versus orthostatic has patient did have symptoms of lightheadedness prior to    · Will monitor patient on telemetry for 24 hours  · Orthostatic vital signs  · Q 4 neuro checks given possibility of head bleed  · Check echo

## 2020-08-15 NOTE — ASSESSMENT & PLAN NOTE
· Patient with syncopal episode after urination  He states that he went to the bathroom to urinate and after finishing urination he became lightheaded and syncopized falling backwards and striking his head on the washing machine  Patient states that he is currently on Eliquis  · CT scan of the head shows 4 to 5 mm area of hyperdensity in the left hamlet (axial image 10, series 2), could represent an area of mineralization, petechial hemorrhaging or possibly a cavernous malformation, traumatic hemorrhage is felt to be unlikely  Small hematoma back of scalp noted  · ED provider discuss case with radiologist who felt low likelihood for bleed  · Patient's neuro exam is nonfocal   Nevertheless for completeness Will re-scanned has head in approximately 6 hours to rule out any kind of bleed given that he is on Eliquis with head trauma  · 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 syncopal episode was probably related to cardiac arrhythmia, can not exclude vasovagal given his significant lightheadedness prior to falling  · Patient was monitored on tele with persistent bradycardia reported  · Orthostatic vital signs  · Q 4 neuro checks given possibility of head bleed  · Repeated CT head showed  Stable 4 mm focal hyperdensity at the left aspect of the hamlet with differential considerations including focal calcification, cavernous angioma, and less likely, acute traumatic hemorrhage  · repeat CT brain in 24 hours is suggested to assess for continued stability  · Echocardiogram currently pending  · Neuro exam this morning is completely unremarkable  · Patient still is, repeated CT and echo consider discharge    · Patient will require cardiology follow-up to revisit the discussion about pacemaker placement

## 2020-08-15 NOTE — ASSESSMENT & PLAN NOTE
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

## 2020-08-15 NOTE — PROGRESS NOTES
Progress Note - Lauryn Fuller 1945, 66 y o  male MRN: [id number]    Unit/Bed#: S -01 Encounter: [id number]    Primary Care Provider: Ash Palmer MD   Date and time admitted to hospital: 8/14/2020  9:26 PM        * Syncope  Assessment & Plan  · Patient with syncopal episode after urination  He states that he went to the bathroom to urinate and after finishing urination he became lightheaded and syncopized falling backwards and striking his head on the washing machine  Patient states that he is currently on Eliquis  · CT scan of the head shows 4 to 5 mm area of hyperdensity in the left hamlet (axial image 10, series 2), could represent an area of mineralization, petechial hemorrhaging or possibly a cavernous malformation, traumatic hemorrhage is felt to be unlikely  Small hematoma back of scalp noted  · ED provider discuss case with radiologist who felt low likelihood for bleed  · Patient's neuro exam is nonfocal   Nevertheless for completeness Will re-scanned has head in approximately 6 hours to rule out any kind of bleed given that he is on Eliquis with head trauma  · 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 syncopal episode was probably related to cardiac arrhythmia, can not exclude vasovagal given his significant lightheadedness prior to falling  · Patient was monitored on tele with persistent bradycardia reported  · Orthostatic vital signs  · Q 4 neuro checks given possibility of head bleed  · Repeated CT head showed  Stable 4 mm focal hyperdensity at the left aspect of the hamlet with differential considerations including focal calcification, cavernous angioma, and less likely, acute traumatic hemorrhage  · repeat CT brain in 24 hours is suggested to assess for continued stability  · Echocardiogram currently pending  · Neuro exam this morning is completely unremarkable  · Patient still is, repeated CT and echo consider discharge    · Patient will require cardiology follow-up to revisit the discussion about pacemaker placement    GASTON (acute kidney injury) ([address] )  Assessment & Plan  · Acute kidney injury present on admission with creatinine 1 31  Currently trending down 1 17   · Baseline appears to be around 1   · Patient's [place]aston I most probably secondary to decreased oral intake  · Initially started on IV fluids, will discontinue fluids and encourage oral intake pain  · Avoid nephrotoxins and systemic hypotension  · Monitor BMP      Essential hypertension  Assessment & Plan  · Patient has been significantly hypertensive since admission, blood pressure is currently stable 132/99  · Patient was on lisinopril which was initially held for acute kidney injury on presentation pain consider resume lisinopril even kidney function is back to baseline  · p r n  Hydralazine for blood pressures greater than 730 systolic  · Monitor blood pressure  Paroxysmal atrial flutter (HCC)  Assessment & Plan  · Heart rates controlled  · Not on beta-blocker secondary to tachy-winifred syndrome and significant bradycardia when on beta-blocker in the past   · Anticoagulated with Eliquis    · Monitor heart rates        VTE Pharmacologic Prophylaxis:   Pharmacologic: Apixaban (Eliquis)  Mechanical VTE Prophylaxis in Place: Yes    Discussions with Specialists or Other Care Team Provider:  Primary medical team, case management    Education and Discussions with Family / Patient:  Discussed with patient his current plan of care    Current Length of Stay: 1 day(s)    Current Patient Status: Inpatient     Discharge Plan / Estimated Discharge Date:  Based on the pending echo and CT scan tomorrow discharge will be discussed    Code Status: Level 1 - Full Code      Subjective:   Patient is lying in bed comfortable denies any chest pain, palpitations, blurry visions, slurred speech, weakness, numbness, tingling, lightheadedness or dizziness pain    Objective:     Vitals:   Temp (24hrs), Av 5 °F (36 9 °C), Min:98 2 °F (36 8 °C), Max:98 9 °F (37 2 °C)    Temp:  [98 2 °F (36 8 °C)-98 9 °F (37 2 °C)] 98 6 °F (37 °C)  HR:  [] 101  Resp:  [16-20] 18  BP: (129-242)/() 135/90  SpO2:  [91 %-97 %] 94 %  Body mass index is 30 39 kg/m²  Input and Output Summary (last 24 hours): Intake/Output Summary (Last 24 hours) at 8/15/2020 1149  Last data filed at 8/15/2020 1106  Gross per 24 hour   Intake 1238 ml   Output 650 ml   Net 588 ml       Physical Exam:     Physical Exam  Vitals signs reviewed  Constitutional:       General: He is not in acute distress  Appearance: Normal appearance  HENT:      Head: Normocephalic  Comments: Posterior parietal soft tissue hematoma with scalp laceration  Cardiovascular:      Rate and Rhythm: Normal rate  Rhythm irregular  Pulmonary:      Effort: Pulmonary effort is normal  No respiratory distress  Breath sounds: Normal breath sounds  No wheezing or rales  Abdominal:      General: Abdomen is flat  There is no distension  Tenderness: There is no abdominal tenderness  Skin:     General: Skin is warm and dry  Capillary Refill: Capillary refill takes less than 2 seconds  Neurological:      General: No focal deficit present  Mental Status: He is alert and oriented to person, place, and time  Cranial Nerves: No cranial nerve deficit  Sensory: No sensory deficit  Motor: No weakness        Coordination: Coordination normal       Gait: Gait normal    Psychiatric:         Mood and Affect: Mood normal          Behavior: Behavior normal              Additional Data:     Labs:    Results from last 7 days   Lab Units 08/15/20  0432 08/14/20  2137   WBC Thousand/uL 9 77 9 77   HEMOGLOBIN g/dL 14 5 14 8   HEMATOCRIT % 43 4 44 2   PLATELETS Thousands/uL 162 150   NEUTROS PCT %  --  77*   LYMPHS PCT %  --  13*   MONOS PCT %  --  7   EOS PCT %  --  1     Results from last 7 days   Lab Units 08/15/20  0432 08/14/20  2209   POTASSIUM mmol/L 4 1 3 8   CHLORIDE mmol/L 103 102   CO2 mmol/L 26 25   BUN mg/dL 18 23   CREATININE mg/dL 1 17 1 31*   CALCIUM mg/dL 8 7 8 5   ALK PHOS U/L  --  59   ALT U/L  --  37   AST U/L  --  21     Results from last 7 days   Lab Units 08/14/20  2209   INR  1 10       * I Have Reviewed All Lab Data Listed Above  * Additional Pertinent Lab Tests Reviewed: [address] Admission Reviewed    Imaging:    Imaging Reports Reviewed Today Include:  Head CT  Imaging Personally Reviewed by Myself Includes:   head CT    Recent Cultures (last 7 days):           Last 24 Hours Medication List:   Current Facility-Administered Medications   Medication Dose Route Frequency Provider Last Rate    acetaminophen  650 mg Oral Q6H PRN Gilbert Valverde PA-C      apixaban  5 mg Oral BID Gilbert Valverde PA-C      atorvastatin  10 mg Oral Daily Gilbert Valverde PA-C      calcium carbonate  1,000 mg Oral Daily PRN Emani Lips, ANA      hydrALAZINE  10 mg Intravenous Q6H PRN Emani Lips, ANA      meclizine  12 5 mg Oral Q8H [facility] Gilbert Valverde PA-C      sodium chloride  100 mL/hr Intravenous Continuous Gilbert Valverde PA-C 100 mL/hr (08/15/20 1106)    tamsulosin  0 4 mg Oral Daily With Dinner Gilbert Valverde PA-C          Today, Patient Was Seen By: Lasha Gilmore MD    ** Please Note: This note has been constructed using a voice recognition system   **

## 2020-08-15 NOTE — ASSESSMENT & PLAN NOTE
· Acute kidney injury present on admission with creatinine 1 31  Currently trending down 1 17   · Baseline appears to be around 1   · Patient's Gaston I most probably secondary to decreased oral intake  · Initially started on IV fluids, will discontinue fluids and encourage oral intake pain  · Avoid nephrotoxins and systemic hypotension    · Monitor BMP

## 2020-08-15 NOTE — PLAN OF CARE
Problem: PAIN - ADULT  Goal: Verbalizes/displays adequate comfort level or baseline comfort level  Description: Interventions:  - Encourage patient to monitor pain and request assistance  - Assess pain using appropriate pain scale  - Administer analgesics based on type and severity of pain and evaluate response  - Implement non-pharmacological measures as appropriate and evaluate response  - Consider cultural and social influences on pain and pain management  - Notify physician/advanced practitioner if interventions unsuccessful or patient reports new pain  Outcome: Progressing     Problem: INFECTION - ADULT  Goal: Absence or prevention of progression during hospitalization  Description: INTERVENTIONS:  - Assess and monitor for signs and symptoms of infection  - Monitor lab/diagnostic results  - Monitor all insertion sites, i e  indwelling lines, tubes, and drains  - Monitor endotracheal if appropriate and nasal secretions for changes in amount and color  - [place] appropriate cooling/warming therapies per order  - Administer medications as ordered  - Instruct and encourage patient and family to use good hand hygiene technique  - Identify and instruct in appropriate isolation precautions for identified infection/condition  Outcome: Progressing  Goal: Absence of fever/infection during neutropenic period  Description: INTERVENTIONS:  - Monitor WBC    Outcome: Progressing     Problem: SAFETY ADULT  Goal: Patient will remain free of falls  Description: INTERVENTIONS:  - Assess patient frequently for physical needs  -  Identify cognitive and physical deficits and behaviors that affect risk of falls    -  [place] fall precautions as indicated by assessment   - Educate patient/family on patient safety including physical limitations  - Instruct patient to call for assistance with activity based on assessment  - Modify environment to reduce risk of injury  - Consider OT/PT consult to assist with strengthening/mobility  Outcome: Progressing  Goal: Maintain or return to baseline ADL function  Description: INTERVENTIONS:  -  Assess patient's ability to carry out ADLs; assess patient's baseline for ADL function and identify physical deficits which impact ability to perform ADLs (bathing, care of mouth/teeth, toileting, grooming, dressing, etc )  - Assess/evaluate cause of self-care deficits   - Assess range of motion  - Assess patient's mobility; develop plan if impaired  - Assess patient's need for assistive devices and provide as appropriate  - Encourage maximum independence but intervene and supervise when necessary  - Involve family in performance of ADLs  - Assess for home care needs following discharge   - Consider OT consult to assist with ADL evaluation and planning for discharge  - Provide patient education as appropriate  Outcome: Progressing  Goal: Maintain or return mobility status to optimal level  Description: INTERVENTIONS:  - Assess patient's baseline mobility status (ambulation, transfers, stairs, etc )    - Identify cognitive and physical deficits and behaviors that affect mobility  - Identify mobility aids required to assist with transfers and/or ambulation (gait belt, sit-to-stand, lift, walker, cane, etc )  - [place] fall precautions as indicated by assessment  - Record patient progress and toleration of activity level on Mobility SBAR; progress patient to next Phase/Stage  - Instruct patient to call for assistance with activity based on assessment  - Consider rehabilitation consult to assist with strengthening/weightbearing, etc   Outcome: Progressing     Problem: DISCHARGE PLANNING  Goal: Discharge to home or other facility with appropriate resources  Description: INTERVENTIONS:  - Identify barriers to discharge w/patient and caregiver  - Arrange for needed discharge resources and transportation as appropriate  - Identify discharge learning needs (meds, wound care, etc )  - Arrange for interpretive services to assist at discharge as needed  - Refer to Case Management Department for coordinating discharge planning if the patient needs post-hospital services based on physician/advanced practitioner order or complex needs related to functional status, cognitive ability, or social support system  Outcome: Progressing     Problem: Knowledge Deficit  Goal: Patient/family/caregiver demonstrates understanding of disease process, treatment plan, medications, and discharge instructions  Description: Complete learning assessment and assess knowledge base    Interventions:  - Provide teaching at level of understanding  - Provide teaching via preferred learning methods  Outcome: Progressing     Problem: NEUROSENSORY - ADULT  Goal: Achieves stable or improved neurological status  Description: INTERVENTIONS  - Monitor and report changes in neurological status  - Monitor vital signs such as temperature, blood pressure, glucose, and any other labs ordered   - Initiate measures to prevent increased intracranial pressure  - Monitor for seizure activity and implement precautions if appropriate      Outcome: Progressing  Goal: Remains free of injury related to seizures activity  Description: INTERVENTIONS  - Maintain airway, patient safety  and administer oxygen as ordered  - Monitor patient for seizure activity, document and report duration and description of seizure to physician/advanced practitioner  - If seizure occurs,  ensure patient safety during seizure  - Reorient patient post seizure  - Seizure pads on all 4 side rails  - Instruct patient/family to notify RN of any seizure activity including if an aura is experienced  - Instruct patient/family to call for assistance with activity based on nursing assessment  - Administer anti-seizure medications if ordered    Outcome: Progressing  Goal: Achieves maximal functionality and self care  Description: INTERVENTIONS  - Monitor swallowing and airway patency with patient fatigue and changes in neurological status  - Encourage and assist patient to increase activity and self care     - Encourage visually impaired, hearing impaired and aphasic patients to use assistive/communication devices  Outcome: Progressing     Problem: CARDIOVASCULAR - ADULT  Goal: Maintains optimal cardiac output and hemodynamic stability  Description: INTERVENTIONS:  - Monitor I/O, vital signs and rhythm  - Monitor for S/S and trends of decreased cardiac output  - Administer and titrate ordered vasoactive medications to optimize hemodynamic stability  - Assess quality of pulses, skin color and temperature  - Assess for signs of decreased coronary artery perfusion  - Instruct patient to report change in severity of symptoms  Outcome: Progressing  Goal: Absence of cardiac dysrhythmias or at baseline rhythm  Description: INTERVENTIONS:  - Continuous cardiac monitoring, vital signs, obtain 12 lead EKG if ordered  - Administer antiarrhythmic and heart rate control medications as ordered  - Monitor electrolytes and administer replacement therapy as ordered  Outcome: Progressing     Problem: SKIN/TISSUE INTEGRITY - ADULT  Goal: Skin integrity remains intact  Description: INTERVENTIONS  - Identify patients at risk for skin breakdown  - Assess and monitor skin integrity  - Assess and monitor nutrition and hydration status  - Monitor labs (i e  albumin)  - Assess for incontinence   - Turn and reposition patient  - Assist with mobility/ambulation  - Relieve pressure over bony prominences  - Avoid friction and shearing  - Provide appropriate hygiene as needed including keeping skin clean and dry  - Evaluate need for skin moisturizer/barrier cream  - Collaborate with interdisciplinary team (i e  Nutrition, Rehabilitation, etc )   - Patient/family teaching  Outcome: Progressing  Goal: Incision(s), wounds(s) or drain site(s) healing without S/S of infection  Description: INTERVENTIONS  - Assess and document risk factors for skin impairment   - Assess and document dressing, incision, wound bed, drain sites and surrounding tissue  - Consider nutrition services referral as needed  - Oral mucous membranes remain intact  - Provide patient/ family education  Outcome: Progressing  Goal: Oral mucous membranes remain intact  Description: INTERVENTIONS  - Assess oral mucosa and hygiene practices  - Implement preventative oral hygiene regimen  - Implement oral medicated treatments as ordered  - Initiate Nutrition services referral as needed  Outcome: Progressing     Problem: MUSCULOSKELETAL - ADULT  Goal: Maintain or return mobility to safest level of function  Description: INTERVENTIONS:  - Assess patient's ability to carry out ADLs; assess patient's baseline for ADL function and identify physical deficits which impact ability to perform ADLs (bathing, care of mouth/teeth, toileting, grooming, dressing, etc )  - Assess/evaluate cause of self-care deficits   - Assess range of motion  - Assess patient's mobility  - Assess patient's need for assistive devices and provide as appropriate  - Encourage maximum independence but intervene and supervise when necessary  - Involve family in performance of ADLs  - Assess for home care needs following discharge   - Consider OT consult to assist with ADL evaluation and planning for discharge  - Provide patient education as appropriate  Outcome: Progressing  Goal: Maintain proper alignment of affected body part  Description: INTERVENTIONS:  - Support, maintain and protect limb and body alignment  - Provide patient/ family with appropriate education  Outcome: Progressing

## 2020-08-16 ENCOUNTER — APPOINTMENT (INPATIENT)
Dept: CT IMAGING | Facility: HOSPITAL | Age: 75
DRG: 309 | End: 2020-08-16
Payer: MEDICARE

## 2020-08-16 ENCOUNTER — HOSPITAL ENCOUNTER (INPATIENT)
Facility: HOSPITAL | Age: 75
LOS: 3 days | Discharge: HOME | DRG: 242 | End: 2020-08-19
Attending: INTERNAL MEDICINE | Admitting: INTERNAL MEDICINE
Payer: MEDICARE

## 2020-08-16 VITALS
HEIGHT: 67 IN | BODY MASS INDEX: 30.45 KG/M2 | OXYGEN SATURATION: 96 % | HEART RATE: 102 BPM | RESPIRATION RATE: 18 BRPM | SYSTOLIC BLOOD PRESSURE: 120 MMHG | DIASTOLIC BLOOD PRESSURE: 80 MMHG | TEMPERATURE: 97.7 F | WEIGHT: 194 LBS

## 2020-08-16 DIAGNOSIS — I61.9 CEREBRAL PARENCHYMAL HEMORRHAGE (HCC): ICD-10-CM

## 2020-08-16 DIAGNOSIS — R55 SYNCOPE: ICD-10-CM

## 2020-08-16 DIAGNOSIS — I49.5 TACHY-BRADY SYNDROME (HCC): ICD-10-CM

## 2020-08-16 DIAGNOSIS — I48.92 PAROXYSMAL ATRIAL FLUTTER (HCC): Primary | ICD-10-CM

## 2020-08-16 LAB
ANION GAP SERPL CALCULATED.3IONS-SCNC: 8 MMOL/L (ref 4–13)
BASOPHILS # BLD AUTO: 0.04 THOUSANDS/ΜL (ref 0–0.1)
BASOPHILS NFR BLD AUTO: 1 % (ref 0–1)
BUN SERPL-MCNC: 15 MG/DL (ref 5–25)
CALCIUM SERPL-MCNC: 8.3 MG/DL (ref 8.3–10.1)
CHLORIDE SERPL-SCNC: 106 MMOL/L (ref 100–108)
CO2 SERPL-SCNC: 27 MMOL/L (ref 21–32)
CREAT SERPL-MCNC: 1.05 MG/DL (ref 0.6–1.3)
EOSINOPHIL # BLD AUTO: 0.21 THOUSAND/ΜL (ref 0–0.61)
EOSINOPHIL NFR BLD AUTO: 3 % (ref 0–6)
ERYTHROCYTE [DISTWIDTH] IN BLOOD BY AUTOMATED COUNT: 14.6 % (ref 11.6–15.1)
GFR SERPL CREATININE-BSD FRML MDRD: 68 ML/MIN/1.73SQ M
GLUCOSE SERPL-MCNC: 107 MG/DL (ref 65–140)
HCT VFR BLD AUTO: 41.5 % (ref 36.5–49.3)
HGB BLD-MCNC: 13.7 G/DL (ref 12–17)
IMM GRANULOCYTES # BLD AUTO: 0.06 THOUSAND/UL (ref 0–0.2)
IMM GRANULOCYTES NFR BLD AUTO: 1 % (ref 0–2)
LYMPHOCYTES # BLD AUTO: 2.29 THOUSANDS/ΜL (ref 0.6–4.47)
LYMPHOCYTES NFR BLD AUTO: 33 % (ref 14–44)
MCH RBC QN AUTO: 31.8 PG (ref 26.8–34.3)
MCHC RBC AUTO-ENTMCNC: 33 G/DL (ref 31.4–37.4)
MCV RBC AUTO: 96 FL (ref 82–98)
MONOCYTES # BLD AUTO: 0.75 THOUSAND/ΜL (ref 0.17–1.22)
MONOCYTES NFR BLD AUTO: 11 % (ref 4–12)
NEUTROPHILS # BLD AUTO: 3.68 THOUSANDS/ΜL (ref 1.85–7.62)
NEUTS SEG NFR BLD AUTO: 51 % (ref 43–75)
NRBC BLD AUTO-RTO: 0 /100 WBCS
PLATELET # BLD AUTO: 158 THOUSANDS/UL (ref 149–390)
PMV BLD AUTO: 9.5 FL (ref 8.9–12.7)
POTASSIUM SERPL-SCNC: 4.2 MMOL/L (ref 3.5–5.3)
RBC # BLD AUTO: 4.31 MILLION/UL (ref 3.88–5.62)
SODIUM SERPL-SCNC: 141 MMOL/L (ref 136–145)
WBC # BLD AUTO: 7.03 THOUSAND/UL (ref 4.31–10.16)

## 2020-08-16 PROCEDURE — 99239 HOSP IP/OBS DSCHRG MGMT >30: CPT | Performed by: INTERNAL MEDICINE

## 2020-08-16 PROCEDURE — 85025 COMPLETE CBC W/AUTO DIFF WBC: CPT | Performed by: INTERNAL MEDICINE

## 2020-08-16 PROCEDURE — 99223 1ST HOSP IP/OBS HIGH 75: CPT | Performed by: INTERNAL MEDICINE

## 2020-08-16 PROCEDURE — 99447 NTRPROF PH1/NTRNET/EHR 11-20: CPT | Performed by: PHYSICIAN ASSISTANT

## 2020-08-16 PROCEDURE — 70450 CT HEAD/BRAIN W/O DYE: CPT

## 2020-08-16 PROCEDURE — 99222 1ST HOSP IP/OBS MODERATE 55: CPT | Performed by: INTERNAL MEDICINE

## 2020-08-16 PROCEDURE — 80048 BASIC METABOLIC PNL TOTAL CA: CPT | Performed by: INTERNAL MEDICINE

## 2020-08-16 PROCEDURE — G1004 CDSM NDSC: HCPCS

## 2020-08-16 RX ORDER — TAMSULOSIN HYDROCHLORIDE 0.4 MG/1
0.4 CAPSULE ORAL
Status: DISCONTINUED | OUTPATIENT
Start: 2020-08-16 | End: 2020-08-19 | Stop reason: HOSPADM

## 2020-08-16 RX ORDER — ACETAMINOPHEN 325 MG/1
650 TABLET ORAL EVERY 6 HOURS PRN
Status: CANCELLED | OUTPATIENT
Start: 2020-08-16

## 2020-08-16 RX ORDER — ATORVASTATIN CALCIUM 10 MG/1
10 TABLET, FILM COATED ORAL DAILY
Status: DISCONTINUED | OUTPATIENT
Start: 2020-08-17 | End: 2020-08-19 | Stop reason: HOSPADM

## 2020-08-16 RX ORDER — HYDRALAZINE HYDROCHLORIDE 20 MG/ML
10 INJECTION INTRAMUSCULAR; INTRAVENOUS EVERY 6 HOURS PRN
Status: DISCONTINUED | OUTPATIENT
Start: 2020-08-16 | End: 2020-08-19 | Stop reason: HOSPADM

## 2020-08-16 RX ORDER — ATORVASTATIN CALCIUM 10 MG/1
10 TABLET, FILM COATED ORAL DAILY
Status: CANCELLED | OUTPATIENT
Start: 2020-08-17

## 2020-08-16 RX ORDER — CALCIUM CARBONATE 200(500)MG
1000 TABLET,CHEWABLE ORAL DAILY PRN
Status: CANCELLED | OUTPATIENT
Start: 2020-08-16

## 2020-08-16 RX ORDER — CALCIUM CARBONATE 200(500)MG
1000 TABLET,CHEWABLE ORAL DAILY PRN
Status: DISCONTINUED | OUTPATIENT
Start: 2020-08-16 | End: 2020-08-19 | Stop reason: HOSPADM

## 2020-08-16 RX ORDER — HYDRALAZINE HYDROCHLORIDE 20 MG/ML
10 INJECTION INTRAMUSCULAR; INTRAVENOUS EVERY 6 HOURS PRN
Status: DISCONTINUED | OUTPATIENT
Start: 2020-08-16 | End: 2020-08-16

## 2020-08-16 RX ORDER — MECLIZINE HCL 12.5 MG/1
12.5 TABLET ORAL EVERY 8 HOURS SCHEDULED
Status: DISCONTINUED | OUTPATIENT
Start: 2020-08-16 | End: 2020-08-19 | Stop reason: HOSPADM

## 2020-08-16 RX ORDER — LISINOPRIL 20 MG/1
20 TABLET ORAL DAILY
Status: DISCONTINUED | OUTPATIENT
Start: 2020-08-17 | End: 2020-08-19 | Stop reason: HOSPADM

## 2020-08-16 RX ORDER — LABETALOL 20 MG/4 ML (5 MG/ML) INTRAVENOUS SYRINGE
5 ONCE
Status: COMPLETED | OUTPATIENT
Start: 2020-08-16 | End: 2020-08-16

## 2020-08-16 RX ORDER — ACETAMINOPHEN 325 MG/1
650 TABLET ORAL EVERY 6 HOURS PRN
Status: DISCONTINUED | OUTPATIENT
Start: 2020-08-16 | End: 2020-08-19 | Stop reason: HOSPADM

## 2020-08-16 RX ORDER — SODIUM CHLORIDE 9 MG/ML
100 INJECTION, SOLUTION INTRAVENOUS CONTINUOUS
Status: CANCELLED | OUTPATIENT
Start: 2020-08-16

## 2020-08-16 RX ORDER — BACITRACIN, NEOMYCIN, POLYMYXIN B 400; 3.5; 5 [USP'U]/G; MG/G; [USP'U]/G
1 OINTMENT TOPICAL 2 TIMES DAILY
Status: DISCONTINUED | OUTPATIENT
Start: 2020-08-16 | End: 2020-08-19 | Stop reason: HOSPADM

## 2020-08-16 RX ORDER — MECLIZINE HCL 12.5 MG/1
12.5 TABLET ORAL EVERY 8 HOURS SCHEDULED
Status: CANCELLED | OUTPATIENT
Start: 2020-08-16

## 2020-08-16 RX ORDER — BACITRACIN, NEOMYCIN, POLYMYXIN B 400; 3.5; 5 [USP'U]/G; MG/G; [USP'U]/G
1 OINTMENT TOPICAL 2 TIMES DAILY
Status: CANCELLED | OUTPATIENT
Start: 2020-08-16

## 2020-08-16 RX ORDER — SODIUM CHLORIDE 9 MG/ML
100 INJECTION, SOLUTION INTRAVENOUS CONTINUOUS
Status: DISCONTINUED | OUTPATIENT
Start: 2020-08-16 | End: 2020-08-16

## 2020-08-16 RX ORDER — HYDRALAZINE HYDROCHLORIDE 20 MG/ML
10 INJECTION INTRAMUSCULAR; INTRAVENOUS EVERY 6 HOURS PRN
Status: CANCELLED | OUTPATIENT
Start: 2020-08-16

## 2020-08-16 RX ORDER — TAMSULOSIN HYDROCHLORIDE 0.4 MG/1
0.4 CAPSULE ORAL
Status: CANCELLED | OUTPATIENT
Start: 2020-08-16

## 2020-08-16 RX ADMIN — LABETALOL 20 MG/4 ML (5 MG/ML) INTRAVENOUS SYRINGE 5 MG: at 19:01

## 2020-08-16 RX ADMIN — SODIUM CHLORIDE 100 ML/HR: 0.9 INJECTION, SOLUTION INTRAVENOUS at 06:01

## 2020-08-16 RX ADMIN — MECLIZINE 12.5 MG: 12.5 TABLET ORAL at 05:07

## 2020-08-16 RX ADMIN — ATORVASTATIN CALCIUM 10 MG: 10 TABLET, FILM COATED ORAL at 08:41

## 2020-08-16 RX ADMIN — BACITRACIN, NEOMYCIN, POLYMYXIN B 1 SMALL APPLICATION: 400; 3.5; 5 OINTMENT TOPICAL at 08:41

## 2020-08-16 RX ADMIN — MECLIZINE HYDROCHLORIDE 12.5 MG: 12.5 TABLET, FILM COATED ORAL at 22:30

## 2020-08-16 RX ADMIN — SODIUM CHLORIDE 100 ML/HR: 0.9 INJECTION, SOLUTION INTRAVENOUS at 13:25

## 2020-08-16 RX ADMIN — MECLIZINE HYDROCHLORIDE 12.5 MG: 12.5 TABLET, FILM COATED ORAL at 14:19

## 2020-08-16 RX ADMIN — HYDRALAZINE HYDROCHLORIDE 10 MG: 20 INJECTION INTRAMUSCULAR; INTRAVENOUS at 16:45

## 2020-08-16 RX ADMIN — TAMSULOSIN HYDROCHLORIDE 0.4 MG: 0.4 CAPSULE ORAL at 16:41

## 2020-08-16 NOTE — DISCHARGE SUMMARY
Discharge-Ailin Yeh 1945, 66 y o  male MRN: [id number]    Unit/Bed#: S -01 Encounter: [id number]    Primary Care Provider: Ko Alfaro MD   Date and time admitted to hospital: 8/14/2020  9:26 PM        * Syncope  Assessment & Plan  · Patient with syncopal episode after urination  He states that he went to the bathroom to urinate and after finishing urination he became lightheaded and syncopized falling backwards and striking his head on the washing machine  Patient states that he is currently on Eliquis  · CT scan of the head shows 4 to 5 mm area of hyperdensity in the left hamlet (axial image 10, series 2), could represent an area of mineralization, petechial hemorrhaging or possibly a cavernous malformation, traumatic hemorrhage is felt to be unlikely  Small hematoma back of scalp noted  · ED provider discussed case with radiologist who felt low likelihood for bleed  · Patient's neuro exam is nonfocal   Nevertheless for completeness CT head was repeated  · Given the patient history of tachy-winifred syndrome, his syncopal episode was probably related to cardiac arrhythmia  · Patient was monitored on tele with persistent bradycardia recorded  · Echocardiogram currently pending  · Neuro exam this morning is completely unremarkable  · Cardiology consulted  Patient will be transferred to [facility] for pacemaker placement  · NPO after midnight  · Continue to monitor on telemetry    Cerebral parenchymal hemorrhage (HCC)  Assessment & Plan  · CT on admission with evidence hyperdensity in the left hamlet originally measuring 4-5 mm  Possible etiology at that time was suspected to be mineralization, petechial hemorrhaging, or possibly a cavernous malformation  · Repeat CT scan done today 08/16/2020 with evidence of increased interval in size of hyperdensity lesion now measuring 0 7 x 0 4 cm, likely indicating a small parenchymal hemorrhage  · Neurosurgery consulted  Appreciate input  They recommend MRI brain with and without contrast prior to any pacemaker placement to evaluate etiology of hyperdensity found on CT head  This has been ordered and currently pending  Will likely be completed at \[facility]\""  · Patient being transferred to \[facility]\"" where he can be evaluated by neurosurgery as well  CHRISTOPHER (acute kidney injury) [facility])  Assessment & Plan  Recent Labs     08/14/20  2209 08/15/20  0432 08/16/20  0450   CREATININE 1 31* 1 17 1 05     · Acute kidney injury present on admission with creatinine 1 31  Now back to baseline  · Baseline appears to be around 1   · Patient's CHRISTOPHER most probably secondary to decreased oral intake  · Initially started on IV fluids  This has been discontinued  · Avoid nephrotoxins and systemic hypotension  · Monitor BMP      Essential hypertension  Assessment & Plan  · Patient was significantly hypertensive on admission  Blood pressure is currently stable at 120/80  · Patient was on lisinopril which was initially held for acute kidney injury on presentation  Consider resuming lisinopril once kidney function is back to baseline  · Monitor BP  · Hydralazine prn for SBP > 180 mmHg      Paroxysmal atrial flutter (HCC)  Assessment & Plan  · Patient admitted after experiencing syncope likely secondary to bradycardia  · Patient has been slightly tachycardic since admission   · Not on beta-blocker secondary to tachy-winifred syndrome and significant bradycardia when on beta-blocker in the past   · Anticoagulated with Eliquis, now currently on hold  · Cardiology consulted  Appreciate input    Patient will be transferred to [facility] for pacemaker placement      Discharging Resident: Rosalie Rich MD  Attending: Ilda Silva MD  PCP: Romero Pelaez MD  Admission Date: 8/14/2020  Discharge Date: 08/16/20    Disposition:      Inpatient [address] at [facility] AND [facility]      Reason for Admission:  Syncope    Consultations During [facility] Stay:  · Cardiology  · Neurosurgery    Procedures Performed:     · None    Medication Adjustments and Discharge Medications:  · Summary of Medication Adjustments made as a result of this hospitalization:  None  · Medication Dosing Tapers - Please refer to Discharge Medication List for details on any medication dosing tapers (if applicable to patient)  · Medications being temporarily held (include recommended restart time):  Eliquis currently on hold  Continue to hold until cleared by cardiology and Neurosurgery  · Discharge Medication List: See after visit summary for reconciled discharge medications  Wound Care Recommendations:  When applicable, please see wound care section of After Visit Summary  Diet Recommendations at Discharge:  Diet -        Diet Orders   (From admission, onward)             Start     Ordered    08/17/20 0001  Diet NPO  Diet effective midnight     Question Answer Comment   Diet Type NPO    RD to adjust diet per protocol? Yes        08/16/20 0929    08/15/20 0748  Room Service  Once     Question:  Type of Service  Answer:  Room Service - Appropriate with Assistance    08/15/20 0747    08/15/20 0035  Diet Regular; Regular House  Diet effective now     Question Answer Comment   Diet Type Regular    Regular Regular House    RD to adjust diet per protocol? Yes        08/15/20 0038              Fluid Restriction - No Fluid Restriction at Discharge  Significant Findings / Test Results:     · BMP on admission with elevated creatinine of 1 31, now back to baseline at 1 05 after IV fluids  · CBC unremarkable  · Troponin negative x3  · CT head on admission with evidence of Moderate to large posterior left parieto-occipital scalp hematoma but no calvarial fracture  4 to 5 mm area of hyperdensity in the left hamlet could represent an area of mineralization, petechial hemorrhaging or possibly a cavernous malformation, traumatic hemorrhage is felt to be unlikely    · Repeat CT head from 08/16/2020 reporting Redemonstration of previously seen subcentimeter hyperdensity in the left hamlet measuring approximately 0 7 x 0 4 cm in size currently, previously measuring approximately 0 5 x 0 3 cm in size, suspect a small parenchymal hemorrhage  Incidental Findings:   · As above       Complications:  None    Hospital Course:     Ed Dates is a 66 y o  male patient who originally presented to the hospital on 8/14/2020 after experiencing a syncopal event at home  Patient reported he became lightheaded after urination and then fell backwards hitting his head on the washing machine  Patient has a history of AFib for which he is on Eliquis  CT scan of the head on admission showed findings reported above  Patient denied any seizure-like activity during this episode  Patient was admitted to [facility] and cardiology was consulted  At this point, the cost of the syncopal event is thought to be secondary to symptomatic bradycardia  Patient has a history of tachy-winifred syndrome  Per cardiology recommendation, patient will be transferred to [facility] for placement of pacemaker in the morning  Eliquis is on hold  Patient is currently NPO after midnight  Additionally, repeat CT head showed possible small parenchymal hemorrhage  Neurosurgery was consulted with recommendations of MRI brain with and without contrast prior to pacemaker placement  This was ordered and currently pending  Will likely be completed at [facility]  Patient will also be evaluated by Neurosurgery once he arrives to [facility]  Condition at Discharge: stable     Discharge Day Visit / Exam:     Subjective:  Patient seen and examined this morning  Patient states he feels great and is ready to go home  Patient denies any headache, lightheadedness, dizziness, vision changes, chest pain, shortness of breath, nausea, vomiting  He has no overnight complaints      Vitals: Blood Pressure: 120/80 (08/16/20 0700)  Pulse: 102 (08/16/20 0700)  Temperature: 97 7 °F (36 5 °C) (08/16/20 0700)  Temp Source: Oral (08/16/20 0700)  Respirations: 18 (08/16/20 0700)  Height: 5' 7" (170 2 cm) (08/15/20 0001)  Weight - Scale: 88 kg (194 lb 0 1 oz) (08/15/20 0001)  SpO2: 96 % (08/16/20 0700)     Exam:   Physical Exam  Vitals signs reviewed  Constitutional:       General: He is not in acute distress  Appearance: Normal appearance  HENT:      Head: Normocephalic  Comments: Posterior parietal soft tissue hematoma with scalp laceration  Eyes:      General: No scleral icterus  Pupils: Pupils are equal, round, and reactive to light  Cardiovascular:      Rate and Rhythm: Normal rate  Rhythm irregular  Pulmonary:      Effort: Pulmonary effort is normal  No respiratory distress  Breath sounds: Normal breath sounds  No wheezing or rales  Abdominal:      General: Abdomen is flat  There is no distension  Tenderness: There is no abdominal tenderness  There is no guarding  Musculoskeletal: Normal range of motion  Skin:     General: Skin is warm and dry  Capillary Refill: Capillary refill takes less than 2 seconds  Neurological:      General: No focal deficit present  Mental Status: He is alert and oriented to person, place, and time  Cranial Nerves: No cranial nerve deficit  Sensory: No sensory deficit  Motor: No weakness  Coordination: Coordination normal       Gait: Gait normal    Psychiatric:         Mood and Affect: Mood normal          Behavior: Behavior normal            Discussion with Family: I offered to contact patient's family and he declined and stated he will update them       Goals of Care Discussions:  · Code Status at Discharge: Level 1 - Full Code  · Were there any Goals of Care Discussions during Hospitalization?: No  · Results of any General Goals of Care Discussions:  Not applicable   · POLST Completed: No   · If POLST Completed, Summary of POLST Agreement Provided Here:  Not applicable   · OK to Rehospitalize if Needed? Yes    Discharge instructions/Information to patient and family:   See after visit summary section titled Discharge Instructions for information provided to patient and family  Planned Readmission:  Readmit at [facility]       ** Please Note: This note has been constructed using a voice recognition system **

## 2020-08-16 NOTE — ASSESSMENT & PLAN NOTE
· Patient with syncopal episode after urination  He states that he went to the bathroom to urinate and after finishing urination he became lightheaded and syncopized falling backwards and striking his head on the washing machine  Patient states that he is currently on Eliquis  · CT scan of the head shows 4 to 5 mm area of hyperdensity in the left hamlet (axial image 10, series 2), could represent an area of mineralization, petechial hemorrhaging or possibly a cavernous malformation, traumatic hemorrhage is felt to be unlikely  Small hematoma back of scalp noted  · ED provider discussed case with radiologist who felt low likelihood for bleed  · Patient's neuro exam is nonfocal   Nevertheless for completeness CT head was repeated  · Given the patient history of tachy-winifred syndrome, his syncopal episode was probably related to cardiac arrhythmia  · Patient was monitored on tele with persistent bradycardia recorded  · Echocardiogram currently pending  · Neuro exam this morning is completely unremarkable  · Cardiology consulted  Patient will be transferred to [facility] for pacemaker placement  · NPO after midnight    · Continue to monitor on telemetry

## 2020-08-16 NOTE — ASSESSMENT & PLAN NOTE
Recent Labs     08/14/20  2209 08/15/20  0432 08/16/20  0450   CREATININE 1 31* 1 17 1 05     · Acute kidney injury present on admission with creatinine 1 31  Now back to baseline  · Baseline appears to be around 1   · Patient's CHRISTOPHER most probably secondary to decreased oral intake  · Initially started on IV fluids  This has been discontinued  · Avoid nephrotoxins and systemic hypotension    · Monitor BMP

## 2020-08-16 NOTE — PLAN OF CARE
Problem: CARDIOVASCULAR - ADULT  Goal: Absence of cardiac dysrhythmias or at baseline rhythm  Description: INTERVENTIONS:  - Continuous cardiac monitoring, vital signs, obtain 12 lead EKG if ordered  - Administer antiarrhythmic and heart rate control medications as ordered  - Monitor electrolytes and administer replacement therapy as ordered  Outcome: Progressing

## 2020-08-16 NOTE — NURSING NOTE
Pt transferred to [facility] to get a pacemaker procedure done  Called and gave a report to the RN  Pt transported via stretcher by [facility]  Pt left the floor in a stable condition

## 2020-08-16 NOTE — ASSESSMENT & PLAN NOTE
· CT on admission with evidence hyperdensity in the left hamlet originally measuring 4-5 mm  Possible etiology at that time was suspected to be mineralization, petechial hemorrhaging, or possibly a cavernous malformation  · Repeat CT scan done today 08/16/2020 with evidence of increased interval in size of hyperdensity lesion now measuring 0 7 x 0 4 cm, likely indicating a small parenchymal hemorrhage  · Neurosurgery consulted  Appreciate input  They recommend MRI brain with and without contrast prior to any pacemaker placement to evaluate etiology of hyperdensity found on CT head  This has been ordered and currently pending  Will likely be completed at [facility]  · Patient being transferred to [facility] where he can be evaluated by neurosurgery as well

## 2020-08-16 NOTE — ASSESSMENT & PLAN NOTE
· Patient admitted after experiencing syncope likely secondary to bradycardia  · Patient has been slightly tachycardic since admission   · Not on beta-blocker secondary to tachy-winifred syndrome and significant bradycardia when on beta-blocker in the past   · Anticoagulated with Eliquis, now currently on hold  · Cardiology consulted  Appreciate input    Patient will be transferred to [facility] for pacemaker placement

## 2020-08-16 NOTE — CONSULTS
Cardiology   Aaron To 66 y o  male MRN: [id number]  Unit/Bed#: S -01 Encounter: [id number]      Reason for Consult / Principal Problem: Syncope    Physician Requesting Consult:  Aram Gilliam MD  Office cardiologist:  Dr Saray Cunningham    Cardiologist: Roberto Watt MD      Assessment/Plan:  Syncope with head trauma and LOC -  Hx of tachybrady syndrome, atypical  Flutter 3:1 AV block on Eliquis  HTN  Hx of hyperkalemia      HPI: Aaron To 66y o  year old male with history of mildly symptomatic sick sinus syndrome with occasional brief lightheadedness in the past  Patient has known requent atypical flutter with 3:1 AV block and previously intermittent marked sinus bradycardia  He has prior history of asymptomatic tachycardia-bradycardia phenomenon   Patient was seen by Dr Justo Nails years ago and recommended to stay on Eliquis  Patient was seen in office by Dr Saray Cunningham on 8/12/2020  Patient stated that he ha had an episode of syncope about six weeks ago following a severe bout of diarrhea and inadequate fluid intake with no similar episodes  Yesterday the patient had a syncopal episode after urination  He states that he went to the bathroom to urinate and after finishing urination he became lightheaded and syncopized falling backwards and striking his head on the washing machine  He is on Eliquis and NO antiarrhythmics or AV alfonso blocking agents  Will discuss with Dr Amalia Pack  Patient will most likely permament pacemaker  Contacted Dr Goldman via phone and discussed patient  Feels that patient needs permanent pacemaker and would like him transferred to [facility] later today for pacemaker in the am  Will hold his Eliquis for now  Slim aware of transfer      Family History:   Family History   Problem Relation Age of Onset    Microcephaly Father     Heart attack Father      Historical Information   Past Medical History:   Diagnosis Date    Atrial tachycardia, paroxysmal ([facility] [address] ) 01/09/2018    from allscripts chart    Bradycardia 01/09/2018    from allscripts chart    Dyslipidemia     Essential hypertension     Impaired fasting glucose     Premature atrial contractions     Sinus bradycardia-tachycardia syndrome (HCC)      Past Surgical History:   Procedure Laterality Date    CHOLECYSTECTOMY LAPAROSCOPIC N/A 2/3/2020    Procedure: CHOLECYSTECTOMY LAPAROSCOPIC;  Surgeon: Mikhail Stone DO;  Location: [facility];  Service: General    CT GUIDED FINE NEEDLE ASPIRATION (ALL INC)  2/28/2020     Social History   Social History     Substance and Sexual Activity   Alcohol Use Yes    Alcohol/week: 2 0 standard drinks    Types: 2 Glasses of wine per week    Comment: a day      Social History     Substance and Sexual Activity   Drug Use No     Social History     Tobacco Use   Smoking Status Never Smoker   Smokeless Tobacco Never Used     Family History:   Family History   Problem Relation Age of Onset    Microcephaly Father     Heart attack Father        Review of Systems:  Review of Systems   Constitutional: Negative  HENT: Negative  Eyes: Negative  Respiratory: Negative  Gastrointestinal: Negative  Endocrine: Negative  Genitourinary: Negative  Musculoskeletal: Negative  Skin: Negative  Allergic/Immunologic: Negative  Neurological: Positive for dizziness  Syncope   Hematological: Negative  Psychiatric/Behavioral: Negative              Scheduled Meds:  Current Facility-Administered Medications   Medication Dose Route Frequency Provider Last Rate    acetaminophen  650 mg Oral Q6H PRN Gilbert Valverde PA-C      apixaban  5 mg Oral BID Cherelle Bashir PA-C      atorvastatin  10 mg Oral Daily Gilbert Valverde PA-C      calcium carbonate  1,000 mg Oral Daily PRN Cherelle Bashir PA-C      hydrALAZINE  10 mg Intravenous Q6H PRN Cherelle Bashir PA-C      meclizine  12 5 mg Oral Q8H [facility] van[facility]      neomycin-bacitracin-polymyxin b  1 small application Topical BID Lasha Gilmore MD      sodium chloride  100 mL/hr Intravenous Continuous Gilbert Valverde PA-C 100 mL/hr (08/16/20 0601)    tamsulosin  0 4 mg Oral Daily With Dinner Gilbert Valverde PA-C       Continuous Infusions:sodium chloride, 100 mL/hr, Last Rate: 100 mL/hr (08/16/20 0601)      PRN Meds:   acetaminophen    calcium carbonate    hydrALAZINE  PTA meds:   Prior to Admission Medications   Prescriptions Last Dose Informant Patient Reported? Taking? Acai 500 MG CAPS  Self Yes No   Sig: Take by mouth   Misc Natural Products (TART CHERRY ADVANCED PO) 8/14/2020 at Unknown time Self Yes Yes   Sig: Take by mouth   Multiple Vitamin (DAILY VALUE MULTIVITAMIN) TABS 8/14/2020 at Unknown time Self Yes Yes   Sig: Take 1 tablet by mouth daily   Probiotic Product (PROBIOTIC-10) CAPS 8/14/2020 at Unknown time Self Yes Yes   Sig: Take by mouth   apixaban (ELIQUIS) 5 mg 8/14/2020 at 0900 Self No Yes   Sig: Take 1 tablet (5 mg total) by mouth 2 (two) times a day Start on 2/12/20   atorvastatin (LIPITOR) 10 mg tablet 8/13/2020 at Unknown time Self No Yes   Sig: Take 1 tablet (10 mg total) by mouth daily   glucosamine-chondroitin 500-400 MG tablet 8/14/2020 at Unknown time Self Yes Yes   Sig: Take 1 tablet by mouth 3 (three) times a day   lisinopril (ZESTRIL) 20 mg tablet 8/13/2020 at Unknown time Self Yes Yes   Sig: Take 1 tablet by mouth daily   tamsulosin (FLOMAX) 0 4 mg 8/13/2020 at Unknown time Self Yes Yes   Sig: Take 0 4 mg by mouth      Facility-Administered Medications: None       No Known Allergies    Objective   Vitals: Blood pressure 120/80, pulse 102, temperature 97 7 °F (36 5 °C), temperature source Oral, resp  rate 18, height 5' 7" (1 702 m), weight 88 kg (194 lb 0 1 oz), SpO2 96 %  , Body mass index is 30 39 kg/m² ,   Orthostatic Blood Pressures      Most Recent Value   Blood Pressure  120/80 filed at 08/16/2020 0700   Patient Position - Orthostatic VS Sitting filed at 08/16/2020 0700            Intake/Output Summary (Last 24 hours) at 8/16/2020 6230  Last data filed at 8/15/2020 1837  Gross per 24 hour   Intake 1958 ml   Output 1000 ml   Net 958 ml       Invasive Devices     Peripheral Intravenous Line            Peripheral IV 08/15/20 Distal;Left;Dorsal (posterior) Forearm 1 day                Physical Exam:  Physical Exam  Constitutional:       Appearance: Normal appearance  HENT:      Head:      Comments: Posterior head laceration  Neck:      Musculoskeletal: Neck supple  Cardiovascular:      Rate and Rhythm: Bradycardia present  Rhythm irregular  Heart sounds: Normal heart sounds  Comments: Tachy-iwnifred  as low as 32 during the night currently in the 100s  Pulmonary:      Effort: Pulmonary effort is normal       Breath sounds: Normal breath sounds  Abdominal:      General: Abdomen is flat  Bowel sounds are normal    Skin:     General: Skin is dry  Capillary Refill: Capillary refill takes less than 2 seconds  Neurological:      Mental Status: He is alert and oriented to person, place, and time     Psychiatric:         Behavior: Behavior normal          Lab Results:   Recent Results (from the past 24 hour(s))   CBC and differential    Collection Time: 08/16/20  4:50 AM   Result Value Ref Range    WBC 7 03 4 31 - 10 16 Thousand/uL    RBC 4 31 3 88 - 5 62 Million/uL    Hemoglobin 13 7 12 0 - 17 0 g/dL    Hematocrit 41 5 36 5 - 49 3 %    MCV 96 82 - 98 fL    MCH 31 8 26 8 - 34 3 pg    MCHC 33 0 31 4 - 37 4 g/dL    RDW 14 6 11 6 - 15 1 %    MPV 9 5 8 9 - 12 7 fL    Platelets 476 496 - 558 Thousands/uL    nRBC 0 /100 WBCs    Neutrophils Relative 51 43 - 75 %    Immat GRANS % 1 0 - 2 %    Lymphocytes Relative 33 14 - 44 %    Monocytes Relative 11 4 - 12 %    Eosinophils Relative 3 0 - 6 %    Basophils Relative 1 0 - 1 %    Neutrophils Absolute 3 68 1 85 - 7 62 Thousands/µL    Immature Grans Absolute 0 06 0 00 - 0 20 Thousand/uL    Lymphocytes Absolute 2 29 0 60 - 4 47 Thousands/µL    Monocytes Absolute 0 75 0 17 - 1 22 Thousand/µL    Eosinophils Absolute 0 21 0 00 - 0 61 Thousand/µL    Basophils Absolute 0 04 0 00 - 0 10 Thousands/µL   Basic metabolic panel    Collection Time: 08/16/20  4:50 AM   Result Value Ref Range    Sodium 141 136 - 145 mmol/L    Potassium 4 2 3 5 - 5 3 mmol/L    Chloride 106 100 - 108 mmol/L    CO2 27 21 - 32 mmol/L    ANION GAP 8 4 - 13 mmol/L    BUN 15 5 - 25 mg/dL    Creatinine 1 05 0 60 - 1 30 mg/dL    Glucose 107 65 - 140 mg/dL    Calcium 8 3 8 3 - 10 1 mg/dL    eGFR 68 ml/min/1 73sq m   ]    Imaging: I have personally reviewed pertinent films in PACS    EKG: atrial fib with RVR  CHEST X-RAY:none  ECHO:  CT of Head: Redemonstration of previously seen subcentimeter hyperdensity in the left hamlet measuring approximately 0 7 x 0 4 cm in size currently, previously measuring approximately 0 5 x 0 3 cm in size, suspect a small parenchymal hemorrhage       Redemonstration of a moderate to large posterior scalp hematoma but no calvarial fracture is seen  Code Status:ful code  Epic/ Allscripts/Care Everywhere records reviewed:     * Please Note: Fluency DirectDictation voice to text software may have been used in the creation of this document   **

## 2020-08-16 NOTE — SOCIAL WORK
CM department notified by provider that patient is requiring transfer to [facility] for pace maker placement and neurosurgery consult  CM spoke with primary nurse regarding patient's requirements for transport  CMN was completed for Transport once a bed is available and PACS secures a transport time  Nursing and provider team will keep patient updated on transfer and transportation updates

## 2020-08-16 NOTE — TELEMEDICINE
On-Call Telephone Note    Contacted by Dr Nataliya Grimes and Dr Lisa Trinidadamy Trimbleer 66 y o  male MRN: [id number]  Unit/Bed#: S -01 Encounter: [id number]    Per provider report, patient presents with syncope after urination, became lightheaded and fell backwards striking his head on washing machine  He has a history of a-fib on eliquis as well as CHRISTOPHER and HTN  Available past medical history,social history, surgical history, medication list, drug allergies and review of systems were reviewed  /80 (BP Location: Right arm)   Pulse 102   Temp 97 7 °F (36 5 °C) (Oral)   Resp 18   Ht 5' 7" (1 702 m)   Wt 88 kg (194 lb 0 1 oz)   SpO2 96%   BMI 30 39 kg/m²      Clinical exam per provider report, patient is GCS 15 and nonfocal, presented with complaint of lightheadedness, dizziness, headaches, and syncope  Imaging personally reviewed and reviewed with attending  CT head on 8/14 with 4 to 5 mm area of hyperdensity in the left hamlet (axial image 10, series 2), could represent an area of mineralization, petechial hemorrhaging or possibly a cavernous malformation, traumatic hemorrhage is felt to be unlikely  Repeat CT head on 8/16 with Redemonstration of previously seen subcentimeter hyperdensity in the left hamlet measuring approximately 0 7 x 0 4 cm in size currently, previously measuring approximately 0 5 x 0 3 cm in size, suspect a small parenchymal hemorrhage  These findings are not consistent with trauma  Assessment and Plan  1  Given patient is neurologically stable at this time, it is unlikely CT head finding is acute as a hamlet hemorrhage would leave patient with devastating symptoms  Patient does not seem to be symptomatic from hamlet hyperdensity  2  Per Abdiel Patel primary team, patient being transferred to [facility] for pacemaker placement at the request of cardiology  3   Would recommend MRI brain w wo contrast prior to any pacemaker placement to evaluate etiology of hyperdensity found on CT head  4  Will see patient in full consult once at [facility]  5  Please call with questions or concerns  All questions answered  Provider is in agreement with the course of action  A total of 20 minutes was spent discussing the presentation, physical exam and formulating a management plan with the provider

## 2020-08-17 ENCOUNTER — APPOINTMENT (INPATIENT)
Dept: NON INVASIVE DIAGNOSTICS | Facility: HOSPITAL | Age: 75
DRG: 242 | End: 2020-08-17
Payer: MEDICARE

## 2020-08-17 ENCOUNTER — APPOINTMENT (INPATIENT)
Dept: RADIOLOGY | Facility: HOSPITAL | Age: 75
DRG: 242 | End: 2020-08-17
Payer: MEDICARE

## 2020-08-17 PROBLEM — R93.0 ABNORMAL CT OF THE HEAD: Status: ACTIVE | Noted: 2020-08-16

## 2020-08-17 LAB
ANION GAP SERPL CALCULATED.3IONS-SCNC: 5 MMOL/L (ref 4–13)
ATRIAL RATE: 97 BPM
BUN SERPL-MCNC: 10 MG/DL (ref 5–25)
CALCIUM SERPL-MCNC: 9.1 MG/DL (ref 8.3–10.1)
CHLORIDE SERPL-SCNC: 108 MMOL/L (ref 100–108)
CO2 SERPL-SCNC: 28 MMOL/L (ref 21–32)
CREAT SERPL-MCNC: 0.94 MG/DL (ref 0.6–1.3)
ERYTHROCYTE [DISTWIDTH] IN BLOOD BY AUTOMATED COUNT: 14.2 % (ref 11.6–15.1)
GFR SERPL CREATININE-BSD FRML MDRD: 78 ML/MIN/1.73SQ M
GLUCOSE SERPL-MCNC: 115 MG/DL (ref 65–140)
HCT VFR BLD AUTO: 45.1 % (ref 36.5–49.3)
HGB BLD-MCNC: 14.9 G/DL (ref 12–17)
MCH RBC QN AUTO: 31.7 PG (ref 26.8–34.3)
MCHC RBC AUTO-ENTMCNC: 33 G/DL (ref 31.4–37.4)
MCV RBC AUTO: 96 FL (ref 82–98)
P AXIS: 268 DEGREES
PLATELET # BLD AUTO: 166 THOUSANDS/UL (ref 149–390)
PMV BLD AUTO: 10.1 FL (ref 8.9–12.7)
POTASSIUM SERPL-SCNC: 4 MMOL/L (ref 3.5–5.3)
PR INTERVAL: 168 MS
QRS AXIS: -22 DEGREES
QRSD INTERVAL: 88 MS
QT INTERVAL: 318 MS
QTC INTERVAL: 403 MS
RBC # BLD AUTO: 4.7 MILLION/UL (ref 3.88–5.62)
SODIUM SERPL-SCNC: 141 MMOL/L (ref 136–145)
T WAVE AXIS: 134 DEGREES
VENTRICULAR RATE: 97 BPM
WBC # BLD AUTO: 8.13 THOUSAND/UL (ref 4.31–10.16)

## 2020-08-17 PROCEDURE — 93306 TTE W/DOPPLER COMPLETE: CPT

## 2020-08-17 PROCEDURE — 80048 BASIC METABOLIC PNL TOTAL CA: CPT | Performed by: INTERNAL MEDICINE

## 2020-08-17 PROCEDURE — 99222 1ST HOSP IP/OBS MODERATE 55: CPT | Performed by: INTERNAL MEDICINE

## 2020-08-17 PROCEDURE — A9585 GADOBUTROL INJECTION: HCPCS | Performed by: INTERNAL MEDICINE

## 2020-08-17 PROCEDURE — 93010 ELECTROCARDIOGRAM REPORT: CPT | Performed by: INTERNAL MEDICINE

## 2020-08-17 PROCEDURE — G1004 CDSM NDSC: HCPCS

## 2020-08-17 PROCEDURE — 99223 1ST HOSP IP/OBS HIGH 75: CPT | Performed by: NEUROLOGICAL SURGERY

## 2020-08-17 PROCEDURE — 93306 TTE W/DOPPLER COMPLETE: CPT | Performed by: INTERNAL MEDICINE

## 2020-08-17 PROCEDURE — 99232 SBSQ HOSP IP/OBS MODERATE 35: CPT | Performed by: INTERNAL MEDICINE

## 2020-08-17 PROCEDURE — 85027 COMPLETE CBC AUTOMATED: CPT | Performed by: INTERNAL MEDICINE

## 2020-08-17 PROCEDURE — 70553 MRI BRAIN STEM W/O & W/DYE: CPT

## 2020-08-17 RX ORDER — CEFAZOLIN SODIUM 2 G/50ML
2000 SOLUTION INTRAVENOUS ONCE
Status: COMPLETED | OUTPATIENT
Start: 2020-08-18 | End: 2020-08-18

## 2020-08-17 RX ADMIN — LISINOPRIL 20 MG: 20 TABLET ORAL at 08:03

## 2020-08-17 RX ADMIN — BACITRACIN, NEOMYCIN, POLYMYXIN B 1 SMALL APPLICATION: 400; 3.5; 5 OINTMENT TOPICAL at 21:57

## 2020-08-17 RX ADMIN — MECLIZINE HYDROCHLORIDE 12.5 MG: 12.5 TABLET, FILM COATED ORAL at 21:56

## 2020-08-17 RX ADMIN — ATORVASTATIN CALCIUM 10 MG: 10 TABLET, FILM COATED ORAL at 08:03

## 2020-08-17 RX ADMIN — GADOBUTROL 9 ML: 604.72 INJECTION INTRAVENOUS at 04:04

## 2020-08-17 RX ADMIN — MECLIZINE HYDROCHLORIDE 12.5 MG: 12.5 TABLET, FILM COATED ORAL at 15:30

## 2020-08-17 RX ADMIN — BACITRACIN, NEOMYCIN, POLYMYXIN B 1 SMALL APPLICATION: 400; 3.5; 5 OINTMENT TOPICAL at 08:03

## 2020-08-17 RX ADMIN — TAMSULOSIN HYDROCHLORIDE 0.4 MG: 0.4 CAPSULE ORAL at 15:30

## 2020-08-17 RX ADMIN — HYDRALAZINE HYDROCHLORIDE 10 MG: 20 INJECTION INTRAMUSCULAR; INTRAVENOUS at 07:55

## 2020-08-17 RX ADMIN — MECLIZINE HYDROCHLORIDE 12.5 MG: 12.5 TABLET, FILM COATED ORAL at 06:02

## 2020-08-18 ENCOUNTER — ANESTHESIA (INPATIENT)
Dept: NON INVASIVE DIAGNOSTICS | Facility: HOSPITAL | Age: 75
DRG: 242 | End: 2020-08-18
Payer: MEDICARE

## 2020-08-18 ENCOUNTER — APPOINTMENT (INPATIENT)
Dept: RADIOLOGY | Facility: HOSPITAL | Age: 75
DRG: 242 | End: 2020-08-18
Payer: MEDICARE

## 2020-08-18 ENCOUNTER — APPOINTMENT (INPATIENT)
Dept: NON INVASIVE DIAGNOSTICS | Facility: HOSPITAL | Age: 75
DRG: 242 | End: 2020-08-18
Attending: INTERNAL MEDICINE
Payer: MEDICARE

## 2020-08-18 PROBLEM — Q28.3 CAVERNOUS MALFORMATION: Status: ACTIVE | Noted: 2020-08-16

## 2020-08-18 LAB
ATRIAL RATE: 107 BPM
P AXIS: 237 DEGREES
PR INTERVAL: 134 MS
QRS AXIS: -31 DEGREES
QRSD INTERVAL: 82 MS
QT INTERVAL: 342 MS
QTC INTERVAL: 456 MS
T WAVE AXIS: 110 DEGREES
VENTRICULAR RATE: 107 BPM

## 2020-08-18 PROCEDURE — 02H63JZ INSERTION OF PACEMAKER LEAD INTO RIGHT ATRIUM, PERCUTANEOUS APPROACH: ICD-10-PCS | Performed by: INTERNAL MEDICINE

## 2020-08-18 PROCEDURE — C1898 LEAD, PMKR, OTHER THAN TRANS: HCPCS

## 2020-08-18 PROCEDURE — C1892 INTRO/SHEATH,FIXED,PEEL-AWAY: HCPCS | Performed by: INTERNAL MEDICINE

## 2020-08-18 PROCEDURE — 71045 X-RAY EXAM CHEST 1 VIEW: CPT

## 2020-08-18 PROCEDURE — 02HK3JZ INSERTION OF PACEMAKER LEAD INTO RIGHT VENTRICLE, PERCUTANEOUS APPROACH: ICD-10-PCS | Performed by: INTERNAL MEDICINE

## 2020-08-18 PROCEDURE — C1785 PMKR, DUAL, RATE-RESP: HCPCS

## 2020-08-18 PROCEDURE — 93010 ELECTROCARDIOGRAM REPORT: CPT | Performed by: INTERNAL MEDICINE

## 2020-08-18 PROCEDURE — 78452 HT MUSCLE IMAGE SPECT MULT: CPT | Performed by: INTERNAL MEDICINE

## 2020-08-18 PROCEDURE — 93005 ELECTROCARDIOGRAM TRACING: CPT

## 2020-08-18 PROCEDURE — C1769 GUIDE WIRE: HCPCS | Performed by: INTERNAL MEDICINE

## 2020-08-18 PROCEDURE — 93016 CV STRESS TEST SUPVJ ONLY: CPT | Performed by: INTERNAL MEDICINE

## 2020-08-18 PROCEDURE — 99024 POSTOP FOLLOW-UP VISIT: CPT | Performed by: INTERNAL MEDICINE

## 2020-08-18 PROCEDURE — C1730 CATH, EP, 19 OR FEW ELECT: HCPCS | Performed by: INTERNAL MEDICINE

## 2020-08-18 PROCEDURE — A9502 TC99M TETROFOSMIN: HCPCS

## 2020-08-18 PROCEDURE — G1004 CDSM NDSC: HCPCS

## 2020-08-18 PROCEDURE — 93620 COMP EP EVL R AT VEN PAC&REC: CPT | Performed by: INTERNAL MEDICINE

## 2020-08-18 PROCEDURE — 93017 CV STRESS TEST TRACING ONLY: CPT

## 2020-08-18 PROCEDURE — 33208 INSRT HEART PM ATRIAL & VENT: CPT | Performed by: INTERNAL MEDICINE

## 2020-08-18 PROCEDURE — 0JH606Z INSERTION OF PACEMAKER, DUAL CHAMBER INTO CHEST SUBCUTANEOUS TISSUE AND FASCIA, OPEN APPROACH: ICD-10-PCS | Performed by: INTERNAL MEDICINE

## 2020-08-18 PROCEDURE — 78452 HT MUSCLE IMAGE SPECT MULT: CPT

## 2020-08-18 PROCEDURE — 93018 CV STRESS TEST I&R ONLY: CPT | Performed by: INTERNAL MEDICINE

## 2020-08-18 RX ORDER — MIDAZOLAM HYDROCHLORIDE 2 MG/2ML
INJECTION, SOLUTION INTRAMUSCULAR; INTRAVENOUS AS NEEDED
Status: DISCONTINUED | OUTPATIENT
Start: 2020-08-18 | End: 2020-08-18

## 2020-08-18 RX ORDER — SODIUM CHLORIDE 9 MG/ML
INJECTION, SOLUTION INTRAVENOUS CONTINUOUS PRN
Status: DISCONTINUED | OUTPATIENT
Start: 2020-08-18 | End: 2020-08-18

## 2020-08-18 RX ORDER — GENTAMICIN SULFATE 40 MG/ML
INJECTION, SOLUTION INTRAMUSCULAR; INTRAVENOUS CODE/TRAUMA/SEDATION MEDICATION
Status: COMPLETED | OUTPATIENT
Start: 2020-08-18 | End: 2020-08-18

## 2020-08-18 RX ORDER — AMINOPHYLLINE DIHYDRATE 25 MG/ML
INJECTION, SOLUTION INTRAVENOUS
Status: DISCONTINUED
Start: 2020-08-18 | End: 2020-08-18 | Stop reason: WASHOUT

## 2020-08-18 RX ORDER — FENTANYL CITRATE 50 UG/ML
INJECTION, SOLUTION INTRAMUSCULAR; INTRAVENOUS AS NEEDED
Status: DISCONTINUED | OUTPATIENT
Start: 2020-08-18 | End: 2020-08-18

## 2020-08-18 RX ORDER — LIDOCAINE HYDROCHLORIDE 10 MG/ML
INJECTION, SOLUTION EPIDURAL; INFILTRATION; INTRACAUDAL; PERINEURAL CODE/TRAUMA/SEDATION MEDICATION
Status: COMPLETED | OUTPATIENT
Start: 2020-08-18 | End: 2020-08-18

## 2020-08-18 RX ORDER — PROPOFOL 10 MG/ML
INJECTION, EMULSION INTRAVENOUS CONTINUOUS PRN
Status: DISCONTINUED | OUTPATIENT
Start: 2020-08-18 | End: 2020-08-18

## 2020-08-18 RX ADMIN — HYDRALAZINE HYDROCHLORIDE 10 MG: 20 INJECTION INTRAMUSCULAR; INTRAVENOUS at 17:13

## 2020-08-18 RX ADMIN — LISINOPRIL 20 MG: 20 TABLET ORAL at 15:57

## 2020-08-18 RX ADMIN — PHENYLEPHRINE HYDROCHLORIDE 100 MCG: 10 INJECTION INTRAVENOUS at 15:04

## 2020-08-18 RX ADMIN — FENTANYL CITRATE 50 MCG: 50 INJECTION, SOLUTION INTRAMUSCULAR; INTRAVENOUS at 14:14

## 2020-08-18 RX ADMIN — MECLIZINE HYDROCHLORIDE 12.5 MG: 12.5 TABLET, FILM COATED ORAL at 22:06

## 2020-08-18 RX ADMIN — PHENYLEPHRINE HYDROCHLORIDE 200 MCG: 10 INJECTION INTRAVENOUS at 14:56

## 2020-08-18 RX ADMIN — IOHEXOL 10 ML: 350 INJECTION, SOLUTION INTRAVENOUS at 14:33

## 2020-08-18 RX ADMIN — PHENYLEPHRINE HYDROCHLORIDE 200 MCG: 10 INJECTION INTRAVENOUS at 14:45

## 2020-08-18 RX ADMIN — BACITRACIN, NEOMYCIN, POLYMYXIN B 1 SMALL APPLICATION: 400; 3.5; 5 OINTMENT TOPICAL at 08:28

## 2020-08-18 RX ADMIN — CEFAZOLIN SODIUM 2000 MG: 2 SOLUTION INTRAVENOUS at 14:17

## 2020-08-18 RX ADMIN — PHENYLEPHRINE HYDROCHLORIDE 100 MCG: 10 INJECTION INTRAVENOUS at 14:39

## 2020-08-18 RX ADMIN — PHENYLEPHRINE HYDROCHLORIDE 200 MCG: 10 INJECTION INTRAVENOUS at 14:54

## 2020-08-18 RX ADMIN — REGADENOSON 0.4 MG: 0.08 INJECTION, SOLUTION INTRAVENOUS at 10:54

## 2020-08-18 RX ADMIN — MECLIZINE HYDROCHLORIDE 12.5 MG: 12.5 TABLET, FILM COATED ORAL at 05:36

## 2020-08-18 RX ADMIN — MECLIZINE HYDROCHLORIDE 12.5 MG: 12.5 TABLET, FILM COATED ORAL at 15:55

## 2020-08-18 RX ADMIN — PHENYLEPHRINE HYDROCHLORIDE 200 MCG: 10 INJECTION INTRAVENOUS at 14:48

## 2020-08-18 RX ADMIN — MIDAZOLAM 1 MG: 1 INJECTION INTRAMUSCULAR; INTRAVENOUS at 14:14

## 2020-08-18 RX ADMIN — BACITRACIN, NEOMYCIN, POLYMYXIN B 1 SMALL APPLICATION: 400; 3.5; 5 OINTMENT TOPICAL at 17:10

## 2020-08-18 RX ADMIN — TAMSULOSIN HYDROCHLORIDE 0.4 MG: 0.4 CAPSULE ORAL at 15:58

## 2020-08-18 RX ADMIN — LIDOCAINE HYDROCHLORIDE 20 ML: 10 INJECTION, SOLUTION EPIDURAL; INFILTRATION; INTRACAUDAL; PERINEURAL at 14:29

## 2020-08-18 RX ADMIN — ATORVASTATIN CALCIUM 10 MG: 10 TABLET, FILM COATED ORAL at 08:28

## 2020-08-18 RX ADMIN — PROPOFOL 50 MCG/KG/MIN: 10 INJECTION, EMULSION INTRAVENOUS at 14:15

## 2020-08-18 RX ADMIN — SODIUM CHLORIDE: 0.9 INJECTION, SOLUTION INTRAVENOUS at 13:45

## 2020-08-18 RX ADMIN — GENTAMICIN SULFATE 80 MG: 40 INJECTION, SOLUTION INTRAMUSCULAR; INTRAVENOUS at 15:06

## 2020-08-18 NOTE — ANESTHESIA POSTPROCEDURE EVALUATION
Post-Op Assessment Note    CV Status:  Stable  Pain Score: 0    Pain management: adequate     Mental Status:  Alert and awake   Hydration Status:  Euvolemic   PONV Controlled:  Controlled   Airway Patency:  Patent      Post Op Vitals Reviewed: Yes      Staff: CRNA         No complications documented      /91 (08/18/20 1523)    Temp      Pulse 92 (08/18/20 1523)   Resp 14 (08/18/20 1523)    SpO2 97 % (08/18/20 1523)

## 2020-08-18 NOTE — ANESTHESIA PREPROCEDURE EVALUATION
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)   (+) Essential hypertension   (+) Paroxysmal atrial flutter (HCC)   (+) 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HCC)      Other   (+) Cavernous malformation   (+) Idiopathic pericarditis   (+) Syncope             Anesthesia Plan  ASA Score- 3     Anesthesia Type- IV sedation with anesthesia with ASA Monitors  Additional Monitors:   Airway Plan:           Plan Factors-    Chart reviewed  Patient summary reviewed  Induction- intravenous  Postoperative Plan-     Informed Consent- Anesthetic plan and risks discussed with patient  I personally reviewed this patient with the CRNA  Discussed and agreed on the Anesthesia Plan with the CRNA  Kushal Wells

## 2020-08-19 ENCOUNTER — DOCUMENTATION (OUTPATIENT)
Dept: PHYSICAL THERAPY | Facility: HOSPITAL | Age: 75
End: 2020-08-19

## 2020-08-19 ENCOUNTER — APPOINTMENT (INPATIENT)
Dept: RADIOLOGY | Facility: HOSPITAL | Age: 75
DRG: 242 | End: 2020-08-19
Payer: MEDICARE

## 2020-08-19 VITALS
BODY MASS INDEX: 31.31 KG/M2 | DIASTOLIC BLOOD PRESSURE: 94 MMHG | TEMPERATURE: 98.8 F | RESPIRATION RATE: 18 BRPM | SYSTOLIC BLOOD PRESSURE: 143 MMHG | HEIGHT: 67 IN | OXYGEN SATURATION: 95 % | WEIGHT: 199.52 LBS | HEART RATE: 98 BPM

## 2020-08-19 LAB
ANION GAP SERPL CALCULATED.3IONS-SCNC: 6 MMOL/L (ref 4–13)
BASOPHILS # BLD AUTO: 0.05 THOUSANDS/ΜL (ref 0–0.1)
BASOPHILS NFR BLD AUTO: 1 % (ref 0–1)
BUN SERPL-MCNC: 15 MG/DL (ref 5–25)
CALCIUM SERPL-MCNC: 8.8 MG/DL (ref 8.3–10.1)
CHLORIDE SERPL-SCNC: 106 MMOL/L (ref 100–108)
CO2 SERPL-SCNC: 27 MMOL/L (ref 21–32)
CREAT SERPL-MCNC: 0.9 MG/DL (ref 0.6–1.3)
EOSINOPHIL # BLD AUTO: 0.14 THOUSAND/ΜL (ref 0–0.61)
EOSINOPHIL NFR BLD AUTO: 2 % (ref 0–6)
ERYTHROCYTE [DISTWIDTH] IN BLOOD BY AUTOMATED COUNT: 14.5 % (ref 11.6–15.1)
GFR SERPL CREATININE-BSD FRML MDRD: 82 ML/MIN/1.73SQ M
GLUCOSE SERPL-MCNC: 110 MG/DL (ref 65–140)
HCT VFR BLD AUTO: 42.2 % (ref 36.5–49.3)
HGB BLD-MCNC: 14.2 G/DL (ref 12–17)
IMM GRANULOCYTES # BLD AUTO: 0.05 THOUSAND/UL (ref 0–0.2)
IMM GRANULOCYTES NFR BLD AUTO: 1 % (ref 0–2)
LYMPHOCYTES # BLD AUTO: 2.33 THOUSANDS/ΜL (ref 0.6–4.47)
LYMPHOCYTES NFR BLD AUTO: 28 % (ref 14–44)
MCH RBC QN AUTO: 32.1 PG (ref 26.8–34.3)
MCHC RBC AUTO-ENTMCNC: 33.6 G/DL (ref 31.4–37.4)
MCV RBC AUTO: 95 FL (ref 82–98)
MONOCYTES # BLD AUTO: 0.79 THOUSAND/ΜL (ref 0.17–1.22)
MONOCYTES NFR BLD AUTO: 9 % (ref 4–12)
NEUTROPHILS # BLD AUTO: 5.02 THOUSANDS/ΜL (ref 1.85–7.62)
NEUTS SEG NFR BLD AUTO: 59 % (ref 43–75)
NRBC BLD AUTO-RTO: 0 /100 WBCS
PLATELET # BLD AUTO: 155 THOUSANDS/UL (ref 149–390)
PMV BLD AUTO: 10 FL (ref 8.9–12.7)
POTASSIUM SERPL-SCNC: 4.1 MMOL/L (ref 3.5–5.3)
RBC # BLD AUTO: 4.43 MILLION/UL (ref 3.88–5.62)
SODIUM SERPL-SCNC: 139 MMOL/L (ref 136–145)
WBC # BLD AUTO: 8.38 THOUSAND/UL (ref 4.31–10.16)

## 2020-08-19 PROCEDURE — 71046 X-RAY EXAM CHEST 2 VIEWS: CPT

## 2020-08-19 PROCEDURE — 99239 HOSP IP/OBS DSCHRG MGMT >30: CPT | Performed by: INTERNAL MEDICINE

## 2020-08-19 PROCEDURE — 80048 BASIC METABOLIC PNL TOTAL CA: CPT | Performed by: INTERNAL MEDICINE

## 2020-08-19 PROCEDURE — 85025 COMPLETE CBC W/AUTO DIFF WBC: CPT | Performed by: INTERNAL MEDICINE

## 2020-08-19 PROCEDURE — 99024 POSTOP FOLLOW-UP VISIT: CPT | Performed by: INTERNAL MEDICINE

## 2020-08-19 RX ORDER — METOPROLOL SUCCINATE 25 MG/1
25 TABLET, EXTENDED RELEASE ORAL DAILY
Qty: 30 TABLET | Refills: 0 | Status: SHIPPED | OUTPATIENT
Start: 2020-08-19 | End: 2020-09-01 | Stop reason: SDUPTHER

## 2020-08-19 RX ADMIN — APIXABAN 5 MG: 5 TABLET, FILM COATED ORAL at 08:35

## 2020-08-19 RX ADMIN — ATORVASTATIN CALCIUM 10 MG: 10 TABLET, FILM COATED ORAL at 08:34

## 2020-08-19 RX ADMIN — BACITRACIN, NEOMYCIN, POLYMYXIN B 1 SMALL APPLICATION: 400; 3.5; 5 OINTMENT TOPICAL at 08:34

## 2020-08-19 RX ADMIN — MECLIZINE HYDROCHLORIDE 12.5 MG: 12.5 TABLET, FILM COATED ORAL at 06:19

## 2020-08-19 RX ADMIN — LISINOPRIL 20 MG: 20 TABLET ORAL at 08:34

## 2020-08-19 NOTE — PROGRESS NOTES
Patient Active Problem List   Diagnosis    Idiopathic pericarditis    Paroxysmal atrial flutter (HCC)    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[facility] [address] )    Essential hypertension    Status post laparoscopic cholecystectomy    Aneurysm of right internal iliac artery ([facility] [address] )    Syncope    Cavernous malformation     Date: 8/19/2020  Time: 08:46 am    Pt Assist x1 ambulate in the halls  Pt is NWB to L UE/wearing L arm sling  Educated/encouraged pt to ambulate with assistance 3-4 x's/day  Pt callbell, phone/tray within reach    Al MARCELINO, Restorative Technician, [employer]

## 2020-08-20 LAB
CHEST PAIN STATEMENT: NORMAL
MAX DIASTOLIC BP: 64 MMHG
MAX HEART RATE: 116 BPM
MAX PREDICTED HEART RATE: 143 BPM
MAX. SYSTOLIC BP: 128 MMHG
PROTOCOL NAME: NORMAL
REASON FOR TERMINATION: NORMAL
TARGET HR FORMULA: NORMAL
TEST INDICATION: NORMAL
TIME IN EXERCISE PHASE: NORMAL

## 2020-08-27 ENCOUNTER — TELEPHONE (OUTPATIENT)
Dept: CARDIOLOGY CLINIC | Facility: CLINIC | Age: 75
End: 2020-08-27

## 2020-08-27 NOTE — TELEPHONE ENCOUNTER
Thank you so much amy, Patient is aware and has not made a decision on if he's going to the ER today or just come home this weekend  Patient just told me that he has not drank water or ate anything since yesterday  I told the patient he will need to drink water and eat something in order to help with the dizziness  Patient wanted to know if there is any medication he can take to help with the dizziness?

## 2020-08-27 NOTE — TELEPHONE ENCOUNTER
He also typically follows with Dr Travis Osorio  I would get his device interrogated and an office visit up at his office if possible today

## 2020-08-27 NOTE — TELEPHONE ENCOUNTER
Patient of Holly Ellis had a pacer implanted on 8/18/20  Patient called stating he is lightheaded, dizzy and is nauseous  Patient denies all other symptoms  This started this morning when he woke up   Please advise,

## 2020-08-27 NOTE — TELEPHONE ENCOUNTER
His device check looks fine, nothing to explain his symptoms from this morning  He had some runs of SVT, but nothing from this morning  I would love for him to be seen in the office and have an in person device interrogation but since he is out of town I don't know how we can help  If he is feeling poorly he should be seen by someone in [place], or return home and can hopefully be seen in [facility]

## 2020-08-27 NOTE — TELEPHONE ENCOUNTER
I spoke to the patient and he stated he did throw up recently  Patient just told me that he is in [address]  Patient does have the celia for sending a transmission, so I asked for the patient to send us transmission  I will let you know when we get it

## 2020-08-27 NOTE — TELEPHONE ENCOUNTER
Patient is aware and he did say he is starting to feel better after eating and drinking a little  Patient has an appointment on Monday with the device clinic  Just an MORI   Thank you

## 2020-08-27 NOTE — TELEPHONE ENCOUNTER
I wouldn't want to recommend something for this over the phone, I would feel more comfortable seeing him in person, knowing his vitals, etc  I think the best thing he can do is be seen in person

## 2020-08-31 ENCOUNTER — IN-CLINIC DEVICE VISIT (OUTPATIENT)
Dept: CARDIOLOGY CLINIC | Facility: CLINIC | Age: 75
End: 2020-08-31

## 2020-08-31 DIAGNOSIS — Z95.0 PRESENCE OF PERMANENT CARDIAC PACEMAKER: Primary | ICD-10-CM

## 2020-08-31 PROCEDURE — 99024 POSTOP FOLLOW-UP VISIT: CPT | Performed by: INTERNAL MEDICINE

## 2020-08-31 NOTE — PROGRESS NOTES
MDT DUAL CHAMBER PM/ACTIVE SYSTEM IS MRI CONDITIONAL   DEVICE INTERROGATED IN THE [place] OFFICE:  BATTERY VOLTAGE ADEQUATE (15 2 YR)   AP 3 6%  <0 1%   ALL LEAD PARAMETERS WITHIN NORMAL LIMITS   8 VT-NS AND 5 FAST A&V EPISODES WITH ALL AVAILABLE EGMS SHOWING PAT WITH  - 203 BPM, AND LONGEST EPISODE 26 SEC     PT TAKES ELIQUIS AND METOPROLOL   NO PROGRAMMING CHANGES MADE TO DEVICE PARAMETERS   INCISION CLEAN AND DRY WITH EDGES APPROXIMATED  Jessica Diaz CARE AND RESTRICTIONS REVIEWED WITH PATIENT   NORMAL DEVICE FUNCTION  Juan C Arechiga

## 2020-09-01 DIAGNOSIS — I48.92 PAROXYSMAL ATRIAL FLUTTER (HCC): ICD-10-CM

## 2020-09-01 NOTE — TELEPHONE ENCOUNTER
I received a refill request for metoprolol succinate 25 milligrams daily but would like you to confirm with patient that he is still on this  It was not on his medication list at his last appointment less than three weeks ago  I will hold off on renewing this medication until I hear further regarding whether he is on this

## 2020-09-02 RX ORDER — METOPROLOL SUCCINATE 25 MG/1
25 TABLET, EXTENDED RELEASE ORAL DAILY
Qty: 90 TABLET | Refills: 3 | Status: SHIPPED | OUTPATIENT
Start: 2020-09-02 | End: 2021-08-17

## 2020-09-02 NOTE — TELEPHONE ENCOUNTER
Left v/m for pt to contact the office about the medication - from his hospital [facility] 8/19/2020 pt was restarted on metoprolol

## 2020-09-24 ENCOUNTER — TELEPHONE (OUTPATIENT)
Dept: CARDIOLOGY CLINIC | Facility: CLINIC | Age: 75
End: 2020-09-24

## 2020-09-24 NOTE — TELEPHONE ENCOUNTER
I called patient  Patient had a pacemaker in a month ago, been lightheaded and a little dizzy  Went to therapy this morning - questionable vertigo - fluctuating heart rate  130/90 range for blood pressure, as high as 96 or 98 sometimes  Patient is in the car now on the way to [place]  Patient was advised that if he was having any symptoms he should do a transmission from his device

## 2020-09-24 NOTE — TELEPHONE ENCOUNTER
Pt complaining of feeling lightheaded now & then and bp has not been consistent  He is traveling to [place] for the next few days  Please advise

## 2020-09-25 NOTE — TELEPHONE ENCOUNTER
Spoke to patient by telephone  He will be returning home on 09/29/2020 and will call the device Clinic to arrange a pacemaker transmission and then call our office to schedule a visit with me regarding management of his lightheadedness and diastolic hypertension  He claims diastolic blood pressures recently in the range of

## 2020-11-17 ENCOUNTER — OFFICE VISIT (OUTPATIENT)
Dept: CARDIOLOGY CLINIC | Facility: CLINIC | Age: 75
End: 2020-11-17
Payer: MEDICARE

## 2020-11-17 VITALS
HEART RATE: 80 BPM | DIASTOLIC BLOOD PRESSURE: 80 MMHG | SYSTOLIC BLOOD PRESSURE: 120 MMHG | HEIGHT: 67 IN | OXYGEN SATURATION: 95 % | WEIGHT: 203 LBS | BODY MASS INDEX: 31.86 KG/M2 | RESPIRATION RATE: 18 BRPM | TEMPERATURE: 97.6 F

## 2020-11-17 DIAGNOSIS — I49.5 TACHY-BRADY SYNDROME (HCC): ICD-10-CM

## 2020-11-17 DIAGNOSIS — R55 SYNCOPE, UNSPECIFIED SYNCOPE TYPE: ICD-10-CM

## 2020-11-17 DIAGNOSIS — I48.92 PAROXYSMAL ATRIAL FLUTTER (HCC): ICD-10-CM

## 2020-11-17 DIAGNOSIS — H81.10 BENIGN PAROXYSMAL POSITIONAL VERTIGO, UNSPECIFIED LATERALITY: Primary | ICD-10-CM

## 2020-11-17 DIAGNOSIS — E78.5 DYSLIPIDEMIA: ICD-10-CM

## 2020-11-17 DIAGNOSIS — I10 UNCONTROLLED HYPERTENSION: ICD-10-CM

## 2020-11-17 DIAGNOSIS — Z95.0 PRESENCE OF PERMANENT CARDIAC PACEMAKER: ICD-10-CM

## 2020-11-17 PROCEDURE — 99214 OFFICE O/P EST MOD 30 MIN: CPT | Performed by: INTERNAL MEDICINE

## 2020-11-17 PROCEDURE — 93000 ELECTROCARDIOGRAM COMPLETE: CPT | Performed by: INTERNAL MEDICINE

## 2020-11-18 ENCOUNTER — HOSPITAL ENCOUNTER (OUTPATIENT)
Dept: NON INVASIVE DIAGNOSTICS | Facility: CLINIC | Age: 75
Discharge: HOME | End: 2020-11-18
Attending: SURGERY
Payer: MEDICARE

## 2020-11-18 DIAGNOSIS — I72.3 ANEURYSM OF RIGHT INTERNAL ILIAC ARTERY (HCC): ICD-10-CM

## 2020-11-18 PROCEDURE — 93978 VASCULAR STUDY: CPT | Performed by: SURGERY

## 2020-11-18 PROCEDURE — 93978 VASCULAR STUDY: CPT

## 2020-12-10 ENCOUNTER — TELEPHONE (OUTPATIENT)
Dept: CARDIOLOGY CLINIC | Facility: CLINIC | Age: 75
End: 2020-12-10

## 2020-12-28 ENCOUNTER — IN-CLINIC DEVICE VISIT (OUTPATIENT)
Dept: CARDIOLOGY CLINIC | Facility: CLINIC | Age: 75
End: 2020-12-28
Payer: MEDICARE

## 2020-12-28 DIAGNOSIS — Z95.0 PRESENCE OF PERMANENT CARDIAC PACEMAKER: Primary | ICD-10-CM

## 2020-12-28 PROCEDURE — 93280 PM DEVICE PROGR EVAL DUAL: CPT | Performed by: INTERNAL MEDICINE

## 2021-01-01 ENCOUNTER — HOSPITAL ENCOUNTER (EMERGENCY)
Facility: HOSPITAL | Age: 76
Discharge: HOME | End: 2021-01-01
Attending: EMERGENCY MEDICINE | Admitting: EMERGENCY MEDICINE
Payer: MEDICARE

## 2021-01-01 ENCOUNTER — APPOINTMENT (EMERGENCY)
Dept: RADIOLOGY | Facility: HOSPITAL | Age: 76
End: 2021-01-01
Payer: MEDICARE

## 2021-01-01 VITALS
HEART RATE: 60 BPM | BODY MASS INDEX: 30.89 KG/M2 | RESPIRATION RATE: 23 BRPM | TEMPERATURE: 96.8 F | WEIGHT: 197.2 LBS | OXYGEN SATURATION: 96 % | SYSTOLIC BLOOD PRESSURE: 184 MMHG | DIASTOLIC BLOOD PRESSURE: 93 MMHG

## 2021-01-01 DIAGNOSIS — R42 DIZZINESS: Primary | ICD-10-CM

## 2021-01-01 DIAGNOSIS — R11.0 NAUSEA: ICD-10-CM

## 2021-01-01 LAB
ALBUMIN SERPL BCP-MCNC: 3.7 G/DL (ref 3.5–5)
ALP SERPL-CCNC: 61 U/L (ref 46–116)
ALT SERPL W P-5'-P-CCNC: 32 U/L (ref 12–78)
ANION GAP SERPL CALCULATED.3IONS-SCNC: 7 MMOL/L (ref 4–13)
APTT PPP: 33 SECONDS (ref 23–37)
AST SERPL W P-5'-P-CCNC: 17 U/L (ref 5–45)
BASOPHILS # BLD AUTO: 0.03 THOUSANDS/ΜL (ref 0–0.1)
BASOPHILS NFR BLD AUTO: 0 % (ref 0–1)
BILIRUB SERPL-MCNC: 1.4 MG/DL (ref 0.2–1)
BUN SERPL-MCNC: 16 MG/DL (ref 5–25)
CALCIUM SERPL-MCNC: 9.1 MG/DL (ref 8.3–10.1)
CHLORIDE SERPL-SCNC: 101 MMOL/L (ref 100–108)
CO2 SERPL-SCNC: 27 MMOL/L (ref 21–32)
CREAT SERPL-MCNC: 0.95 MG/DL (ref 0.6–1.3)
EOSINOPHIL # BLD AUTO: 0.13 THOUSAND/ΜL (ref 0–0.61)
EOSINOPHIL NFR BLD AUTO: 2 % (ref 0–6)
ERYTHROCYTE [DISTWIDTH] IN BLOOD BY AUTOMATED COUNT: 13.5 % (ref 11.6–15.1)
GFR SERPL CREATININE-BSD FRML MDRD: 77 ML/MIN/1.73SQ M
GLUCOSE SERPL-MCNC: 127 MG/DL (ref 65–140)
HCT VFR BLD AUTO: 48 % (ref 36.5–49.3)
HGB BLD-MCNC: 15.7 G/DL (ref 12–17)
IMM GRANULOCYTES # BLD AUTO: 0.05 THOUSAND/UL (ref 0–0.2)
IMM GRANULOCYTES NFR BLD AUTO: 1 % (ref 0–2)
INR PPP: 1.24 (ref 0.84–1.19)
LYMPHOCYTES # BLD AUTO: 1.11 THOUSANDS/ΜL (ref 0.6–4.47)
LYMPHOCYTES NFR BLD AUTO: 14 % (ref 14–44)
MAGNESIUM SERPL-MCNC: 2 MG/DL (ref 1.6–2.6)
MCH RBC QN AUTO: 31.2 PG (ref 26.8–34.3)
MCHC RBC AUTO-ENTMCNC: 32.7 G/DL (ref 31.4–37.4)
MCV RBC AUTO: 95 FL (ref 82–98)
MONOCYTES # BLD AUTO: 0.62 THOUSAND/ΜL (ref 0.17–1.22)
MONOCYTES NFR BLD AUTO: 8 % (ref 4–12)
NEUTROPHILS # BLD AUTO: 6.06 THOUSANDS/ΜL (ref 1.85–7.62)
NEUTS SEG NFR BLD AUTO: 75 % (ref 43–75)
NRBC BLD AUTO-RTO: 0 /100 WBCS
PLATELET # BLD AUTO: 154 THOUSANDS/UL (ref 149–390)
PMV BLD AUTO: 9.8 FL (ref 8.9–12.7)
POTASSIUM SERPL-SCNC: 4.1 MMOL/L (ref 3.5–5.3)
PROT SERPL-MCNC: 7.4 G/DL (ref 6.4–8.2)
PROTHROMBIN TIME: 15.5 SECONDS (ref 11.6–14.5)
RBC # BLD AUTO: 5.04 MILLION/UL (ref 3.88–5.62)
SODIUM SERPL-SCNC: 135 MMOL/L (ref 136–145)
TROPONIN I SERPL-MCNC: 0.03 NG/ML
TROPONIN I SERPL-MCNC: 0.03 NG/ML
TSH SERPL DL<=0.05 MIU/L-ACNC: 1.76 UIU/ML (ref 0.36–3.74)
WBC # BLD AUTO: 8 THOUSAND/UL (ref 4.31–10.16)

## 2021-01-01 PROCEDURE — 70450 CT HEAD/BRAIN W/O DYE: CPT

## 2021-01-01 PROCEDURE — 85610 PROTHROMBIN TIME: CPT | Performed by: EMERGENCY MEDICINE

## 2021-01-01 PROCEDURE — 84484 ASSAY OF TROPONIN QUANT: CPT | Performed by: EMERGENCY MEDICINE

## 2021-01-01 PROCEDURE — 71045 X-RAY EXAM CHEST 1 VIEW: CPT

## 2021-01-01 PROCEDURE — 85025 COMPLETE CBC W/AUTO DIFF WBC: CPT | Performed by: EMERGENCY MEDICINE

## 2021-01-01 PROCEDURE — 93005 ELECTROCARDIOGRAM TRACING: CPT

## 2021-01-01 PROCEDURE — 83735 ASSAY OF MAGNESIUM: CPT | Performed by: EMERGENCY MEDICINE

## 2021-01-01 PROCEDURE — 36415 COLL VENOUS BLD VENIPUNCTURE: CPT | Performed by: EMERGENCY MEDICINE

## 2021-01-01 PROCEDURE — 99285 EMERGENCY DEPT VISIT HI MDM: CPT | Performed by: EMERGENCY MEDICINE

## 2021-01-01 PROCEDURE — 99285 EMERGENCY DEPT VISIT HI MDM: CPT

## 2021-01-01 PROCEDURE — 96374 THER/PROPH/DIAG INJ IV PUSH: CPT

## 2021-01-01 PROCEDURE — 80053 COMPREHEN METABOLIC PANEL: CPT | Performed by: EMERGENCY MEDICINE

## 2021-01-01 PROCEDURE — G1004 CDSM NDSC: HCPCS

## 2021-01-01 PROCEDURE — 84443 ASSAY THYROID STIM HORMONE: CPT | Performed by: EMERGENCY MEDICINE

## 2021-01-01 PROCEDURE — 85730 THROMBOPLASTIN TIME PARTIAL: CPT | Performed by: EMERGENCY MEDICINE

## 2021-01-01 RX ORDER — ONDANSETRON 2 MG/ML
4 INJECTION INTRAMUSCULAR; INTRAVENOUS ONCE
Status: COMPLETED | OUTPATIENT
Start: 2021-01-01 | End: 2021-01-01

## 2021-01-01 RX ORDER — ONDANSETRON 4 MG/1
4 TABLET, ORALLY DISINTEGRATING ORAL EVERY 8 HOURS PRN
Qty: 20 TABLET | Refills: 0 | Status: SHIPPED | OUTPATIENT
Start: 2021-01-01 | End: 2021-01-08

## 2021-01-01 RX ADMIN — ONDANSETRON 4 MG: 2 INJECTION INTRAMUSCULAR; INTRAVENOUS at 12:14

## 2021-01-01 RX ADMIN — SODIUM CHLORIDE 250 ML: 0.9 INJECTION, SOLUTION INTRAVENOUS at 12:14

## 2021-01-01 NOTE — ED NOTES
Pt ambulates to BR and returns to bed without difficulty  No distress noted        Giovanna Medrano RN  01/01/21 [id number]

## 2021-01-01 NOTE — ED PROVIDER NOTES
History  Chief Complaint   Patient presents with    Dizziness     States dizziness and nausea for 2 days  Seen by PCP, balance center, and cardiologist for same recently  States he did fall yesterday, denies injury  Denies CP       History provided by:  Patient   used: No    Dizziness  Quality:  Lightheadedness  Severity:  Moderate  Onset quality:  Gradual  Timing:  Intermittent  Progression:  Waxing and waning  Chronicity:  New  Relieved by:  None tried  Worsened by:  Nothing  Ineffective treatments:  None tried  Associated symptoms: no chest pain, no nausea, no palpitations and no tinnitus    Risk factors: multiple medications        Prior to Admission Medications   Prescriptions Last Dose Informant Patient Reported? Taking?    Acai 500 MG CAPS Past Week at Unknown time Self Yes Yes   Sig: Take by mouth   Misc Natural Products (TART CHERRY ADVANCED PO) Past Week at Unknown time Self Yes Yes   Sig: Take by mouth   Multiple Vitamin (DAILY VALUE MULTIVITAMIN) TABS Past Week at Unknown time Self Yes Yes   Sig: Take 1 tablet by mouth daily   Probiotic Product (PROBIOTIC-10) CAPS Past Week at Unknown time Self Yes Yes   Sig: Take by mouth   apixaban (ELIQUIS) 5 mg 12/31/2020 at Unknown time Self No Yes   Sig: Take 1 tablet (5 mg total) by mouth 2 (two) times a day Start on 2/12/20   atorvastatin (LIPITOR) 10 mg tablet 12/31/2020 at Unknown time Self No Yes   Sig: Take 1 tablet (10 mg total) by mouth daily   glucosamine-chondroitin 500-400 MG tablet Past Week at Unknown time Self Yes Yes   Sig: Take 1 tablet by mouth 3 (three) times a day   lisinopril (ZESTRIL) 20 mg tablet 12/31/2020 at Unknown time Self Yes Yes   Sig: Take 1 tablet by mouth daily   metoprolol succinate (TOPROL-XL) 25 mg 24 hr tablet 12/31/2020 at Unknown time  No Yes   Sig: Take 1 tablet (25 mg total) by mouth daily   tamsulosin (FLOMAX) 0 4 mg 12/31/2020 at Unknown time Self Yes Yes   Sig: Take 0 4 mg by mouth Facility-Administered Medications: None       Past Medical History:   Diagnosis Date    Atrial tachycardia, paroxysmal ([facility] [address] ) 01/09/2018    from allscripts chart    Bradycardia 01/09/2018    from allscripts chart    Dyslipidemia     Essential hypertension     Impaired fasting glucose     Premature atrial contractions     Sinus bradycardia-tachycardia syndrome ([facility] [address] )        Past Surgical History:   Procedure Laterality Date    CHOLECYSTECTOMY LAPAROSCOPIC N/A 2/3/2020    Procedure: CHOLECYSTECTOMY LAPAROSCOPIC;  Surgeon: Brenden Mcintyre DO;  Location: [facility];  Service: General    CT GUIDED FINE NEEDLE ASPIRATION (ALL INC)  2/28/2020       Family History   Problem Relation Age of Onset    Microcephaly Father     Heart attack Father      I have reviewed and agree with the history as documented  E-Cigarette/Vaping    E-Cigarette Use Never User      E-Cigarette/Vaping Substances    Nicotine No     THC No     CBD No     Flavoring No     Other No     Unknown No      Social History     Tobacco Use    Smoking status: Never Smoker    Smokeless tobacco: Never Used   Substance Use Topics    Alcohol use: Yes     Alcohol/week: 2 0 standard drinks     Types: 2 Glasses of wine per week     Comment: a day     Drug use: No       Review of Systems   Constitutional: Negative for activity change, appetite change, chills, fatigue and fever  HENT: Negative for congestion, dental problem, facial swelling, sore throat, tinnitus and trouble swallowing  Eyes: Negative for pain, discharge and itching  Respiratory: Negative for apnea, chest tightness and wheezing  Cardiovascular: Negative for chest pain, palpitations and leg swelling  Gastrointestinal: Negative for abdominal pain and nausea  Genitourinary: Negative for difficulty urinating, dysuria and flank pain  Musculoskeletal: Negative for arthralgias, back pain, gait problem, joint swelling and neck pain     Skin: Negative for color change, rash and wound  Neurological: Positive for dizziness and light-headedness  Negative for facial asymmetry  Psychiatric/Behavioral: Negative for agitation and behavioral problems  The patient is not nervous/anxious  All other systems reviewed and are negative  Physical Exam  Physical Exam  Vitals signs and nursing note reviewed  Constitutional:       General: He is not in acute distress  Appearance: He is well-developed  He is not diaphoretic  HENT:      Head: Normocephalic and atraumatic  Right Ear: External ear normal       Left Ear: External ear normal    Eyes:      General:         Right eye: No discharge  Left eye: No discharge  Pupils: Pupils are equal, round, and reactive to light  Neck:      Musculoskeletal: Normal range of motion and neck supple  Thyroid: No thyromegaly  Trachea: No tracheal deviation  Cardiovascular:      Rate and Rhythm: Normal rate and regular rhythm  Heart sounds: No murmur  Pulmonary:      Effort: Pulmonary effort is normal       Breath sounds: Normal breath sounds  Abdominal:      General: Bowel sounds are normal  There is no distension  Palpations: Abdomen is soft  Tenderness: There is no abdominal tenderness  Musculoskeletal: Normal range of motion  General: No deformity  Skin:     General: Skin is warm  Capillary Refill: Capillary refill takes less than 2 seconds  Neurological:      Mental Status: He is alert and oriented to person, place, and time  Cranial Nerves: No cranial nerve deficit  Motor: No abnormal muscle tone  Comments: Normal cranial nerve exam   Normal strength and sensation bilateral upper lower extremities  Normal coordination normal gait     Psychiatric:         Behavior: Behavior normal          Vital Signs  ED Triage Vitals [01/01/21 1108]   Temperature Pulse Respirations Blood Pressure SpO2   (!) 96 8 °F (36 °C) 72 18 (!) 188/94 97 %      Temp Source Heart Rate Source Patient Position - Orthostatic VS BP Location FiO2 (%)   Tympanic Monitor Sitting Left arm --      Pain Score       --           Vitals:    01/01/21 1246 01/01/21 1359 01/01/21 1400 01/01/21 1445   BP: 167/89 (!) 180/90 (!) 181/93 (!) 184/93   Pulse: 60 90 81 60   Patient Position - Orthostatic VS: Lying Lying           Visual Acuity      ED Medications  Medications   sodium chloride 0 9 % bolus 250 mL (0 mL Intravenous Stopped 1/1/21 1240)   ondansetron (ZOFRAN) injection 4 mg (4 mg Intravenous Given 1/1/21 1214)       Diagnostic Studies  Results Reviewed     Procedure Component Value Units Date/Time    Troponin I repeat in 3hrs [[id number]]  (Normal) Collected: 01/01/21 1407    Lab Status: Final result Specimen: Blood from Arm, Right Updated: 01/01/21 1437     Troponin I 0 03 ng/mL     TSH, 3rd generation with Free T4 reflex [[id number]]  (Normal) Collected: 01/01/21 1121    Lab Status: Final result Specimen: Blood from Arm, Right Updated: 01/01/21 1153     TSH 3RD GENERATON 1 759 uIU/mL     Narrative:      Patients undergoing fluorescein dye angiography may retain small amounts of fluorescein in the body for 48-72 hours post procedure  Samples containing fluorescein can produce falsely depressed TSH values  If the patient had this procedure,a specimen should be resubmitted post fluorescein clearance        Magnesium [[id number]]  (Normal) Collected: 01/01/21 1121    Lab Status: Final result Specimen: Blood from Arm, Right Updated: 01/01/21 1153     Magnesium 2 0 mg/dL     Troponin I [[id number]]  (Normal) Collected: 01/01/21 1121    Lab Status: Final result Specimen: Blood from Arm, Right Updated: 01/01/21 1150     Troponin I 0 03 ng/mL     Comprehensive metabolic panel [[id number]]  (Abnormal) Collected: 01/01/21 1121    Lab Status: Final result Specimen: Blood from Arm, Right Updated: 01/01/21 1145     Sodium 135 mmol/L      Potassium 4 1 mmol/L      Chloride 101 mmol/L      CO2 27 mmol/L      ANION GAP 7 mmol/L      BUN 16 mg/dL      Creatinine 0 95 mg/dL      Glucose 127 mg/dL      Calcium 9 1 mg/dL      AST 17 U/L      ALT 32 U/L      Alkaline Phosphatase 61 U/L      Total Protein 7 4 g/dL      Albumin 3 7 g/dL      Total Bilirubin 1 40 mg/dL      eGFR 77 ml/min/1 73sq m     Narrative:      [place] guidelines for Chronic Kidney Disease (CKD):     Stage 1 with normal or high GFR (GFR > 90 mL/min/1 73 square meters)    Stage 2 Mild CKD (GFR = 60-89 mL/min/1 73 square meters)    Stage 3A Moderate CKD (GFR = 45-59 mL/min/1 73 square meters)    Stage 3B Moderate CKD (GFR = 30-44 mL/min/1 73 square meters)    Stage 4 Severe CKD (GFR = 15-29 mL/min/1 73 square meters)    Stage 5 End Stage CKD (GFR <15 mL/min/1 73 square meters)  Note: GFR calculation is accurate only with a steady state creatinine    Protime-INR [[id number]]  (Abnormal) Collected: 01/01/21 1121    Lab Status: Final result Specimen: Blood from Arm, Right Updated: 01/01/21 1141     Protime 15 5 seconds      INR 1 24    APTT [[id number]]  (Normal) Collected: 01/01/21 1121    Lab Status: Final result Specimen: Blood from Arm, Right Updated: 01/01/21 1141     PTT 33 seconds     CBC and differential [[id number]] Collected: 01/01/21 1121    Lab Status: Final result Specimen: Blood from Arm, Right Updated: 01/01/21 1128     WBC 8 00 Thousand/uL      RBC 5 04 Million/uL      Hemoglobin 15 7 g/dL      Hematocrit 48 0 %      MCV 95 fL      MCH 31 2 pg      MCHC 32 7 g/dL      RDW 13 5 %      MPV 9 8 fL      Platelets 205 Thousands/uL      nRBC 0 /100 WBCs      Neutrophils Relative 75 %      Immat GRANS % 1 %      Lymphocytes Relative 14 %      Monocytes Relative 8 %      Eosinophils Relative 2 %      Basophils Relative 0 %      Neutrophils Absolute 6 06 Thousands/µL      Immature Grans Absolute 0 05 Thousand/uL      Lymphocytes Absolute 1 11 Thousands/µL      Monocytes Absolute 0 62 Thousand/µL      Eosinophils Absolute 0 13 Thousand/µL      Basophils Absolute 0 03 Thousands/µL                  XR chest 1 view portable   ED Interpretation by David Avitia MD (01/01 1156)   NAD  Final Result by Dante Reece MD (01/01 5687)      No acute cardiopulmonary disease  Workstation performed: [id number]         CT head without contrast   Final Result by Carlita Ragland MD (01/01 1150)      No acute intracranial abnormality                  Workstation performed: [id number]                    Procedures  ECG 12 Lead Documentation Only    Date/Time: 1/1/2021 11:22 AM  Performed by: David Avitia MD  Authorized by: David Avitia MD     Indications / Diagnosis:  Dizziness  ECG reviewed by me, the ED Provider: yes    Patient location:  ED  Rate:     ECG rate:  64    ECG rate assessment: normal    Rhythm:     Rhythm: paced    Pacing:     Capture:  Complete  Ectopy:     Ectopy: none    QRS:     QRS axis:  Left    QRS intervals:  Normal  Conduction:     Conduction: normal    ST segments:     ST segments:  Normal  T waves:     T waves: inverted      Inverted:  III, V4, V5 and V6             ED Course                                           MDM  Number of Diagnoses or Management Options  Dizziness: new and requires workup  Nausea: new and requires workup  Diagnosis management comments: Intermittent dizziness over the past several months  Has a pacemaker in place  Denies any headache, chest pain  No true syncope  Has had some nausea  EKG unremarkable  Laboratory studies unremarkable  Will check 2nd troponin  Patient not truly orthostatic on orthostatic vital signs  Will give gentle hydration, medicate for nausea, observe on telemetry and re-evaluate patient  Troponin x2 negative  Patient no worsening of his symptoms  Able to ambulate  Mild rehydration given to patient  PCP and cardiology follow-up recommended         Amount and/or Complexity of Data Reviewed  Clinical lab tests: ordered and reviewed  Tests in the radiology section of CPT®: ordered and reviewed  Tests in the medicine section of CPT®: ordered and reviewed    Risk of Complications, Morbidity, and/or Mortality  Presenting problems: high  Diagnostic procedures: moderate  Management options: high    Patient Progress  Patient progress: stable      Disposition  Final diagnoses:   Dizziness   Nausea     Time reflects when diagnosis was documented in both MDM as applicable and the Disposition within this note     Time User Action Codes Description Comment    1/1/2021 12:24 PM Anita Feller Add [R42] Dizziness     1/1/2021  2:45 PM Anita Feller Add [R11 0] Nausea       ED Disposition     ED Disposition Condition Date/Time Comment    Discharge Stable Fri Jan 1, 2021  2:39 PM Iliana Silveira discharge to home/self care  Follow-up Information     Follow up With Specialties Details Why Contact Info Additional Information    Vianca Reyna MD Internal Medicine Schedule an appointment as soon as possible for a visit in 3 days Follow-up ER visit for dizziness  [address] [facility] [address]  [address] Emergency Department Emergency Medicine Go to  If symptoms worsen [facility]  [phone] [facility], [address]    Your cardiologist  Schedule an appointment as soon as possible for a visit in 3 days As needed, follow-up ER visit dizziness              Discharge Medication List as of 1/1/2021  2:42 PM      CONTINUE these medications which have NOT CHANGED    Details   Acai 500 MG CAPS Take by mouth, Historical Med      apixaban (ELIQUIS) 5 mg Take 1 tablet (5 mg total) by mouth 2 (two) times a day Start on 2/12/20, Starting Wed 2/5/2020, Sample      atorvastatin (LIPITOR) 10 mg tablet Take 1 tablet (10 mg total) by mouth daily, Starting Wed 12/18/2019, Normal      glucosamine-chondroitin 500-400 MG tablet Take 1 tablet by mouth 3 (three) times a day, Historical Med      lisinopril (ZESTRIL) 20 mg tablet Take 1 tablet by mouth daily, Historical Med      metoprolol succinate (TOPROL-XL) 25 mg 24 hr tablet Take 1 tablet (25 mg total) by mouth daily, Starting Wed 9/2/2020, Normal      Misc Natural Products (TART CHERRY ADVANCED PO) Take by mouth, Historical Med      Multiple Vitamin (DAILY VALUE MULTIVITAMIN) TABS Take 1 tablet by mouth daily, Historical Med      Probiotic Product (PROBIOTIC-10) CAPS Take by mouth, Historical Med      tamsulosin (FLOMAX) 0 4 mg Take 0 4 mg by mouth, Historical Med           No discharge procedures on file      PDMP Review     None          ED Provider  Electronically Signed by           David Avitia MD  01/01/21 4797

## 2021-01-04 LAB
ATRIAL RATE: 64 BPM
P AXIS: 30 DEGREES
PR INTERVAL: 210 MS
QRS AXIS: -22 DEGREES
QRSD INTERVAL: 88 MS
QT INTERVAL: 414 MS
QTC INTERVAL: 427 MS
T WAVE AXIS: 12 DEGREES
VENTRICULAR RATE: 64 BPM

## 2021-01-04 PROCEDURE — 93010 ELECTROCARDIOGRAM REPORT: CPT | Performed by: INTERNAL MEDICINE

## 2021-01-05 ENCOUNTER — TELEPHONE (OUTPATIENT)
Dept: CARDIOLOGY CLINIC | Facility: CLINIC | Age: 76
End: 2021-01-05

## 2021-01-05 NOTE — TELEPHONE ENCOUNTER
Spoke with patient by telephone on 01/05/2021 regarding dizziness with nausea and dry heaves  Symptoms were identical to those that he reported to me beginning in August and occur when he lies down in bed and when he gets up from bed  They are most consistent with benign positional vertigo  Patient has been lax in doing vestibular exercises and will pursue those twice a day on a more consistent basis as prophylaxis against these episodes  He will let me know if they are unsuccessful in preventing further episodes  I reassured the patient that the symptoms are not likely to be side effects of any of his medication

## 2021-01-05 NOTE — TELEPHONE ENCOUNTER
Patient called c/o feeling dizzy with nausea  He did have a virtual visit with his PCP yesterday  He feels it is do to his BP medications  No vomiting, fever or covid symptoms  He stated it has happen to him before with his BP meds  Pt has an upcoming appt with Dr Yadi Whitehead on 2/16  I offered him today at 1pm but he was not available       Please reach patient at [phone]

## 2021-01-07 DIAGNOSIS — I48.92 PAROXYSMAL ATRIAL FLUTTER (HCC): ICD-10-CM

## 2021-01-07 RX ORDER — APIXABAN 5 MG/1
TABLET, FILM COATED ORAL
Qty: 180 TABLET | Refills: 3 | Status: SHIPPED | OUTPATIENT
Start: 2021-01-07 | End: 2022-01-31

## 2021-01-20 NOTE — ASSESSMENT & PLAN NOTE
Problem: Patient Care Overview  Goal: Plan of Care Review  Outcome: Ongoing (interventions implemented as appropriate)  Pt tolerated Zometa with no complications. Pt denies jaw pain. Pt instructed to call MD with any problems, and pt discharged home with wife at side.        · Patient has been significantly hypertensive since admission, blood pressure is currently stable 132/99  · Patient was on lisinopril which was initially held for acute kidney injury on presentation pain consider resume lisinopril even kidney function is back to baseline  · p r n  Hydralazine for blood pressures greater than 094 systolic  · Monitor blood pressure  [FreeTextEntry1] : Blood pressure better today that it was in the cardiologists office yesterday. They will monitor at home. Continue metoprolol and valsartan. Continue eliquis\par Plavix, statin, metoprolol for CAD\par Edema controlled. continue QOD lasix\par Increase lexapro to 10 mg\par COVID test today per patients daughters request\par Followup 3-4 months or as needed

## 2021-02-05 NOTE — PATIENT INSTRUCTIONS
1   Continue present medications  2   Cardiology follow-up in approximately 6 months with EKG and no lab work  3   Patient is having lab work in the near future and will have copies sent to me  Left ear pain

## 2021-03-02 ENCOUNTER — OFFICE VISIT (OUTPATIENT)
Dept: CARDIOLOGY CLINIC | Facility: CLINIC | Age: 76
End: 2021-03-02
Payer: MEDICARE

## 2021-03-02 VITALS
BODY MASS INDEX: 31.71 KG/M2 | DIASTOLIC BLOOD PRESSURE: 76 MMHG | TEMPERATURE: 97.9 F | HEART RATE: 70 BPM | SYSTOLIC BLOOD PRESSURE: 134 MMHG | OXYGEN SATURATION: 96 % | HEIGHT: 67 IN | WEIGHT: 202 LBS

## 2021-03-02 DIAGNOSIS — I10 ESSENTIAL HYPERTENSION: ICD-10-CM

## 2021-03-02 DIAGNOSIS — H81.10 BENIGN PAROXYSMAL POSITIONAL VERTIGO, UNSPECIFIED LATERALITY: ICD-10-CM

## 2021-03-02 DIAGNOSIS — I49.5 TACHY-BRADY SYNDROME (HCC): ICD-10-CM

## 2021-03-02 DIAGNOSIS — E66.9 CLASS 1 OBESITY: ICD-10-CM

## 2021-03-02 DIAGNOSIS — Z95.0 PRESENCE OF PERMANENT CARDIAC PACEMAKER: ICD-10-CM

## 2021-03-02 DIAGNOSIS — R73.01 IMPAIRED FASTING GLUCOSE: ICD-10-CM

## 2021-03-02 DIAGNOSIS — I48.92 PAROXYSMAL ATRIAL FLUTTER (HCC): Primary | ICD-10-CM

## 2021-03-02 DIAGNOSIS — E78.5 DYSLIPIDEMIA: ICD-10-CM

## 2021-03-02 PROBLEM — E66.811 CLASS 1 OBESITY: Status: ACTIVE | Noted: 2021-03-02

## 2021-03-02 PROCEDURE — 99214 OFFICE O/P EST MOD 30 MIN: CPT | Performed by: INTERNAL MEDICINE

## 2021-03-02 NOTE — PROGRESS NOTES
Office Cardiology Progress Note  María García 64 y o  male MRN: [id number]  03/02/21  8:58 AM      ASSESSMENT:       1  Benign positional vertigo, recurrent, and present for approximately 7 months, but   Last severe episode was on 01/01/2021 with none since  2  Controlled hypertension with average home reading of 126/82 three months ago  3  Status post Medtronic Sunburst XT DR RIVERA permanent pacemaker implant 08/18/2020 with normal device function as of   12/28/2020 and unofficially as of 10/03/2020 with history of  symptomatic sick sinus syndrome with occasional brief lightheadedness, syncope in August, 2020, known  frequent atypical flutter with 3:1 AV block and previously showing   intermittent marked sinus bradycardia   Prior history of asymptomatic tachycardia-bradycardia phenomenon   Seen by Dr David Hawkins and recommended to stay on Eliquis  4  Chronic PACs with 4 8% frequency on 02/06/2018 Holter monitor and known atrial tachycardia runs of up to 46 beats with fastest rate during  bpm   20% atrial fibrillation/flutter burden on Zio patch 03/09/2020 with five runs of wide complex tachycardia, moderate PACs and rare PVCs with no symptoms  New mild first-degree AV block and atrial pacing  As of 01/01/2021   5  Hypertensive heart disease without heart failure     6  Chronically impaired fasting glucose with most recent fasting blood glucose 127 on  01/01/2021   7  History of prior hyperkalemia and azotemia on ACE inhibition, currently suppressed     8  History of right renal mass cryoablation 5/17/12 with previous follow-up at [facility], but patient now discharged from follow-up     9  Controlled dyslipidemia as of 08/29/2019  10  Controlled recurrent diverticulitis     11  Stable BPH and urinary frequency/nocturia     12  Chronic obesity, class I, with a 5 pound weight gain  in approximately 6-1/2 months    11  Normal nuclear stress study on 10/4/16 with 70% LVEF    12  Status post  laparoscopic cholecystectomy at [facility] for acute cholecystitis on 02/03/2020 and subsequent IR drainage of collection of blood from cholecystectomy bed on 02/29/2020  13  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  Plan       Patient Instructions     1  Try to remember to do head and neck exercises once or twice a day to prevent recurrence of vertigo  2  Continue current medication  3  Cardiology follow-up approximately six months with EKG, lipids, CMP, A1c       HPI    This 64 y o  male  denies new cardiopulmonary and medical symptoms  The patient presented to [facility] Emergency Department on 01/01/2021 with benign positional vertigo and was treated with hydration, medication for nausea, observation, and blood work/ imaging  No acute process was uncovered and patient was discharged home  I can versus with the patient on 01/05/2021 and thought the symptoms word identical to those reported in early August, 2020 consistent with benign positional vertigo  I recommended vestibular head neck exercise, which the patient did faithfully for quite a while but has since become lax with those exercises  Despite the lack of recent vestibular exercise, the patient has had no recurrence of benign positional vertigo since 01/01/2021  The patient took home blood pressures between 12/2 and 12/06/2020 with calculated average of 126/82  We communicated with the patient regarding this on 12/10/2020 and recommended additional weight loss  No additional medication changes were recommended  The patient is being seen in follow-up of the below listed diagnoses  Encounter Diagnoses   Name Primary?    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[address] )     Essential hypertension         Review of Systems    All other systems negative, except as noted in history of present illness    Historical Information   Past Medical History:   Diagnosis Date    Atrial tachycardia, paroxysmal ([address] ) 01/09/2018    from allscripts chart    Bradycardia 01/09/2018    from allscripts chart    Dyslipidemia     Essential hypertension     Impaired fasting glucose     Premature atrial contractions     Sinus bradycardia-tachycardia syndrome (HCC)      Past Surgical History:   Procedure Laterality Date    CHOLECYSTECTOMY LAPAROSCOPIC N/A 2/3/2020    Procedure: CHOLECYSTECTOMY LAPAROSCOPIC;  Surgeon: Natty Lamb DO;  [facility];  Service: General    CT GUIDED FINE NEEDLE ASPIRATION (ALL INC)  2/28/2020     Social History     Substance and Sexual Activity   Alcohol Use Yes    Alcohol/week: 2 0 standard drinks    Types: 2 Glasses of wine per week    Comment: a day      Social History     Substance and Sexual Activity   Drug Use No     Social History     Tobacco Use   Smoking Status Never Smoker   Smokeless Tobacco Never Used       Family History:  Family History   Problem Relation Age of Onset    Microcephaly Father     Heart attack Father          Meds/Allergies     Prior to Admission medications    Medication Sig Start Date End Date Taking?  Authorizing Provider   Acai 500 MG CAPS Take by mouth   Yes Historical Provider, MD   atorvastatin (LIPITOR) 10 mg tablet Take 1 tablet (10 mg total) by mouth daily 12/18/19  Yes Magi Rich MD   Eliquis 5 MG TAKE 1 TABLET TWICE A DAY 1/7/21  Yes Magi Rich MD   glucosamine-chondroitin 500-400 MG tablet Take 1 tablet by mouth 3 (three) times a day   Yes Historical Provider, MD   lisinopril (ZESTRIL) 20 mg tablet Take 1 tablet by mouth daily   Yes Historical Provider, MD   metoprolol succinate (TOPROL-XL) 25 mg 24 hr tablet Take 1 tablet (25 mg total) by mouth daily 9/2/20  Yes Magi Rich MD   [facility] Natural Products (TART CHERRY ADVANCED PO) Take by mouth   Yes Historical Provider, MD   Multiple Vitamin (DAILY VALUE MULTIVITAMIN) TABS Take 1 tablet by mouth daily   Yes Historical Provider, MD   Probiotic Product (PROBIOTIC-10) CAPS Take by mouth   Yes Historical Provider, MD   tamsulosin (FLOMAX) 0 4 mg Take 0 4 mg by mouth   Yes Historical Provider, MD   ondansetron (ZOFRAN-ODT) 4 mg disintegrating tablet Take 1 tablet (4 mg total) by mouth every 8 (eight) hours as needed for nausea or vomiting for up to 7 days 1/1/21 1/8/21  Tamar Esquivel MD       No Known Allergies      Vitals:    03/02/21 0804   BP: 134/76   BP Location: Right arm   Patient Position: Sitting   Cuff Size: Large   Pulse: 70   Temp: 97 9 °F (36 6 °C)   TempSrc: Temporal   SpO2: 96%   Weight: 91 6 kg (202 lb)   Height: 5' 7" (1 702 m)       Body mass index is 31 64 kg/m²  1 pound weight loss in approximately 3-1/2 months and 5 pound weight gain in approximately 6-1/2 months    Physical Exam:    General Appearance:  Alert, cooperative, no distress, appears stated age and is mildly obese     Head:  Normocephalic, without obvious abnormality, atraumatic   Eyes:  PERRL, conjunctiva/corneas clear, EOM's intact,   both eyes   Ears:  Normal TM's and external ear canals, both ears   Nose: Nares normal, septum midline, mucosa normal, no drainage or sinus tenderness   Throat: Lips, mucosa, and tongue normal; teeth and gums normal   Neck: Supple, symmetrical, trachea midline, no adenopathy, thyroid: not enlarged, symmetric, no tenderness/mass/nodules, no carotid bruit or JVD   Back:   Symmetric, no curvature, ROM normal, no CVA tenderness   Lungs:   Clear to auscultation bilaterally, respirations unlabored   Chest Wall:  No tenderness or deformity with left upper pectoral pacemaker device in place   Heart:  Regular rate and rhythm, S1, S2 normal, no murmur, rub or gallop   Abdomen:   Soft, non-tender, bowel sounds active all four quadrants,  no masses, no organomegaly; moderate obesity noted  Extremities: Extremities normal, atraumatic, no cyanosis or edema   Pulses: 2+ and symmetric   Skin: Skin showed normal color, texture, turgor and no rashes or lesions   Lymph nodes: Cervical, supraclavicular, and axillary nodes normal   Neurologic: Normal         Cardiographics    ECG 01/01/21:    100% atrial paced rhythm with prolonged AV conduction  Lateral T inversion  Compared to 11/17/2020, new atrial pacing with suppression of premature atrial contractions and slower heart rate by 22 bpm      [employer] office pacemaker interrogation 12/28/2020:     Battery voltage adequate at 14 6 years  16 3% atrial pacing and 0 3% ventricular pacing  Normal lead parameters  22 VT and 17 fast A and V episodes  PAT with heart rate up to 200 bpm with longest episode 3 minutes 13 seconds, all asymptomatic      IMAGING:    Xr Chest 2 Views    Result Date: 8/19/2020  Impression No acute consolidation or congestion Workstation performed: [id number]     Portable chest x-ray 01/01/2021:    No acute cardiopulmonary disease  Left-sided pacemaker device with leads are intact    CT of brain without contrast 01/01/2021:    No acute intracranial abnormality        LAB REVIEW:      Lab Results   Component Value Date    SODIUM 135 (L) 01/01/2021    K 4 1 01/01/2021     01/01/2021    CO2 27 01/01/2021    BUN 16 01/01/2021    CREATININE 0 95 01/01/2021    GLUF 104 (H) 08/29/2019    CALCIUM 9 1 01/01/2021    AST 17 01/01/2021    ALT 32 01/01/2021    ALKPHOS 61 01/01/2021    EGFR 77 01/01/2021   Glucose 01/01/2021:  127 with otherwise normal CMP except for total bilirubin 1 40      Lab Results   Component Value Date    CHOLESTEROL 159 08/29/2019    CHOLESTEROL 149 09/17/2018    CHOLESTEROL 154 02/06/2018     Lab Results   Component Value Date    HDL 47 08/29/2019    HDL 49 09/17/2018    HDL 51 02/06/2018       Lab Results   Component Value Date    LDLCALC 93 08/29/2019    LDLCALC 78 09/17/2018    LDLCALC 85 02/06/2018     No components found for: [facility]  Lab Results   Component Value Date    TRIG 94 08/29/2019    TRIG 112 09/17/2018    TRIG 88 02/06/2018     Other laboratory data 01/01/2021:    Normal troponin X 2, magnesium, TSH, PTT, CBC  PT/ INR:  15 5/1 24          Nela Marquis MD

## 2021-03-02 NOTE — PATIENT INSTRUCTIONS
1  Try to remember to do head neck exercises once or twice a day to prevent recurrence of vertigo  2  Continue current medication    3  Cardiology follow-up approximately six months with EKG, lipids, CMP, A1c

## 2021-04-01 ENCOUNTER — REMOTE DEVICE CLINIC VISIT (OUTPATIENT)
Dept: CARDIOLOGY CLINIC | Facility: CLINIC | Age: 76
End: 2021-04-01
Payer: MEDICARE

## 2021-04-01 DIAGNOSIS — Z95.0 PRESENCE OF PERMANENT CARDIAC PACEMAKER: Primary | ICD-10-CM

## 2021-04-01 PROCEDURE — 93296 REM INTERROG EVL PM/IDS: CPT | Performed by: INTERNAL MEDICINE

## 2021-04-01 PROCEDURE — 93294 REM INTERROG EVL PM/LDLS PM: CPT | Performed by: INTERNAL MEDICINE

## 2021-04-01 NOTE — PROGRESS NOTES
Results for orders placed or performed in visit on 04/01/21   Cardiac EP device report    Narrative    MDT [address] IS MRI CONDITIONAL  CARELINK TRANSMISSION: BATTERY VOLTAGE ADEQUATE  (14 1 YRS) AP 64%  1%  ALL AVAILABLE LEAD PARAMETERS WITHIN NORMAL LIMITS  9 VHR EPISODES DETECTED, SVT NOTED   NORMAL DEVICE FUNCTION --STEELE

## 2021-07-02 ENCOUNTER — REMOTE DEVICE CLINIC VISIT (OUTPATIENT)
Dept: CARDIOLOGY CLINIC | Facility: CLINIC | Age: 76
End: 2021-07-02
Payer: MEDICARE

## 2021-07-02 DIAGNOSIS — Z95.0 PRESENCE OF PERMANENT CARDIAC PACEMAKER: Primary | ICD-10-CM

## 2021-07-02 PROCEDURE — 93294 REM INTERROG EVL PM/LDLS PM: CPT | Performed by: INTERNAL MEDICINE

## 2021-07-02 PROCEDURE — 93296 REM INTERROG EVL PM/IDS: CPT | Performed by: INTERNAL MEDICINE

## 2021-07-02 NOTE — PROGRESS NOTES
Results for orders placed or performed in visit on 07/02/21   Cardiac EP device report    Narrative    MDT [address] IS MRI CONDITIONAL  CARELINK TRANSMISSION: BATTERY VOLTAGE ADEQUATE  (13 6 YRS) AP 83%  1%  ALL AVAILABLE LEAD PARAMETERS WITHIN NORMAL LIMITS  3 VHR EPISODES DETECTED MOST RECENT 11 BEATS @ 340ms, NSVT AND SVT NOTED  PATIENT IS ON ELIQUIS AND METOPROLOL SUCC; EF 55% (2020)   NORMAL DEVICE FUNCTION --STEELE

## 2021-08-13 DIAGNOSIS — I48.92 PAROXYSMAL ATRIAL FLUTTER (HCC): ICD-10-CM

## 2021-08-17 RX ORDER — METOPROLOL SUCCINATE 25 MG/1
TABLET, EXTENDED RELEASE ORAL
Qty: 90 TABLET | Refills: 3 | Status: SHIPPED | OUTPATIENT
Start: 2021-08-17

## 2021-09-09 ENCOUNTER — TELEPHONE (OUTPATIENT)
Dept: CARDIOLOGY CLINIC | Facility: CLINIC | Age: 76
End: 2021-09-09

## 2021-09-09 ENCOUNTER — LAB (OUTPATIENT)
Dept: LAB | Facility: CLINIC | Age: 76
End: 2021-09-09
Payer: MEDICARE

## 2021-09-09 ENCOUNTER — OFFICE VISIT (OUTPATIENT)
Dept: CARDIOLOGY CLINIC | Facility: CLINIC | Age: 76
End: 2021-09-09
Payer: MEDICARE

## 2021-09-09 VITALS
TEMPERATURE: 97.8 F | HEART RATE: 71 BPM | HEIGHT: 67 IN | OXYGEN SATURATION: 96 % | SYSTOLIC BLOOD PRESSURE: 122 MMHG | BODY MASS INDEX: 31.86 KG/M2 | WEIGHT: 203 LBS | DIASTOLIC BLOOD PRESSURE: 78 MMHG

## 2021-09-09 DIAGNOSIS — E66.9 CLASS 1 OBESITY: ICD-10-CM

## 2021-09-09 DIAGNOSIS — I48.92 PAROXYSMAL ATRIAL FLUTTER (HCC): Primary | ICD-10-CM

## 2021-09-09 DIAGNOSIS — E78.5 DYSLIPIDEMIA: ICD-10-CM

## 2021-09-09 DIAGNOSIS — I49.5 SICK SINUS SYNDROME (HCC): ICD-10-CM

## 2021-09-09 DIAGNOSIS — K05.30 CHRONIC PERIODONTITIS: ICD-10-CM

## 2021-09-09 DIAGNOSIS — Z95.0 PRESENCE OF PERMANENT CARDIAC PACEMAKER: ICD-10-CM

## 2021-09-09 DIAGNOSIS — R73.01 IMPAIRED FASTING GLUCOSE: ICD-10-CM

## 2021-09-09 DIAGNOSIS — I10 ESSENTIAL HYPERTENSION: ICD-10-CM

## 2021-09-09 DIAGNOSIS — Z01.810 PREOPERATIVE CARDIOVASCULAR EXAMINATION: ICD-10-CM

## 2021-09-09 DIAGNOSIS — I49.5 TACHY-BRADY SYNDROME (HCC): ICD-10-CM

## 2021-09-09 LAB
ALBUMIN SERPL BCP-MCNC: 3.9 G/DL (ref 3.5–5)
ALP SERPL-CCNC: 60 U/L (ref 46–116)
ALT SERPL W P-5'-P-CCNC: 43 U/L (ref 12–78)
ANION GAP SERPL CALCULATED.3IONS-SCNC: 4 MMOL/L (ref 4–13)
AST SERPL W P-5'-P-CCNC: 20 U/L (ref 5–45)
BILIRUB SERPL-MCNC: 0.72 MG/DL (ref 0.2–1)
BUN SERPL-MCNC: 22 MG/DL (ref 5–25)
CALCIUM SERPL-MCNC: 9.1 MG/DL (ref 8.3–10.1)
CHLORIDE SERPL-SCNC: 105 MMOL/L (ref 100–108)
CHOLEST SERPL-MCNC: 158 MG/DL (ref 50–200)
CO2 SERPL-SCNC: 25 MMOL/L (ref 21–32)
CREAT SERPL-MCNC: 1.03 MG/DL (ref 0.6–1.3)
EST. AVERAGE GLUCOSE BLD GHB EST-MCNC: 120 MG/DL
GFR SERPL CREATININE-BSD FRML MDRD: 69 ML/MIN/1.73SQ M
GLUCOSE P FAST SERPL-MCNC: 106 MG/DL (ref 65–99)
HBA1C MFR BLD: 5.8 %
HDLC SERPL-MCNC: 39 MG/DL
LDLC SERPL CALC-MCNC: 84 MG/DL (ref 0–100)
NONHDLC SERPL-MCNC: 119 MG/DL
POTASSIUM SERPL-SCNC: 4.5 MMOL/L (ref 3.5–5.3)
PROT SERPL-MCNC: 7.7 G/DL (ref 6.4–8.2)
SODIUM SERPL-SCNC: 134 MMOL/L (ref 136–145)
TRIGL SERPL-MCNC: 173 MG/DL

## 2021-09-09 PROCEDURE — 99214 OFFICE O/P EST MOD 30 MIN: CPT | Performed by: INTERNAL MEDICINE

## 2021-09-09 PROCEDURE — 36415 COLL VENOUS BLD VENIPUNCTURE: CPT

## 2021-09-09 PROCEDURE — 80061 LIPID PANEL: CPT

## 2021-09-09 PROCEDURE — 83036 HEMOGLOBIN GLYCOSYLATED A1C: CPT

## 2021-09-09 PROCEDURE — 93000 ELECTROCARDIOGRAM COMPLETE: CPT | Performed by: INTERNAL MEDICINE

## 2021-09-09 PROCEDURE — 80053 COMPREHEN METABOLIC PANEL: CPT

## 2021-09-09 NOTE — PATIENT INSTRUCTIONS
1  Continue current medication  2  Go for lab work as planned today  3  Cardiology follow-up approximately six months with EKG

## 2021-09-09 NOTE — RESULT ENCOUNTER NOTE
Blood chemistry from 09/09/2021 showed mild dehydration from fasting without water drinking, slightly above normal blood sugar of 106, not of major concern  Lipid panel showed good cholesterol but elevated triglycerides, probably related to weight gain and dietary indiscretion  Recommend following a heart healthy diet and losing some weight

## 2021-09-09 NOTE — PROGRESS NOTES
Office Cardiology Progress Note  January Barrett 64 y o  male MRN: [id number]  09/09/21  8:52 AM      ASSESSMENT:    1  Suppressed benign positional vertigo, recurrent, and present for approximately 7 months, but last severe episode was on 01/01/2021 with none since  2  Controlled essential hypertension with average home reading of 126/82 nine months ago  3  Status post Medtronic Ross XT DR NICOLE permanent pacemaker implant 08/18/2020 with normal device function as of   09/07/2021 and unofficially as of 10/03/2020 with history of  symptomatic sick sinus syndrome with occasional brief lightheadedness, syncope in August, 2020, known  frequent atypical flutter with 3:1 AV block and previously showing   intermittent marked sinus bradycardia   Prior history of asymptomatic tachycardia-bradycardia phenomenon   Seen by Dr Nadja Mixon and recommended to stay on Eliquis  4  Chronic PACs with 4 8% frequency on 02/06/2018 Holter monitor and known atrial tachycardia runs of up to 46 beats with fastest rate during  bpm   20% atrial fibrillation/flutter burden on Zio patch 03/09/2020 with five runs of wide complex tachycardia, moderate PACs and rare PVCs with no symptoms   New mild first-degree AV block and atrial pacing as of 01/01/2021  Longest recent run of SVT was 53 seconds as of 09/07/2021   5  Hypertensive heart disease without heart failure     6  Chronically impaired fasting glucose with most recent fasting blood glucose 127 on  01/01/2021   7  History of prior hyperkalemia and azotemia on ACE inhibition, currently suppressed     8  History of right renal mass cryoablation 5/17/12 with previous follow-up at [facility], but patient now discharged from follow-up     9  Controlled dyslipidemia as of 08/29/2019  10  Controlled recurrent diverticulitis     11  Stable BPH and urinary frequency/nocturia     12  Chronic obesity, class I, with a 6 pound weight gain  in approximately  1+ year    11  Normal nuclear stress study on 10/4/16 with 70% LVEF    12  Status post  laparoscopic cholecystectomy at [facility] for acute cholecystitis on 02/03/2020 and subsequent IR drainage of collection of blood from cholecystectomy bed on 02/29/2020  13  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  Plan       Patient Instructions     1  Continue current medication  2  Go for lab work as planned today  3  Cardiology follow-up approximately six months with EKG  4  No cardiac contraindication to proposed dental implant with plan to hold Eliquis for two days prior to procedure  This medication should be resumed on the evening of the procedure if no objections by oral surgeon  HPI    This 64 y o  male  denies new cardiopulmonary and medical symptoms  The patient has been enjoying his condominium in [place], [place] approximately every other weekend  The patient complains of feeling almost lightheadedness but denies any vertigo  He also feels his energy level is subpar and requires a nap on a daily basis  The patient is being seen in follow-up of the below listed diagnoses  He will soon be undergoing a dental implant  Encounter Diagnoses   Name Primary?     Paroxysmal atrial flutter (HCC) Yes    Preoperative cardiovascular examination     Chronic periodontitis     Presence of permanent cardiac pacemaker     Dyslipidemia     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HCC)     Essential hypertension     Sick sinus syndrome (HCC)     Class 1 obesity     Impaired fasting glucose         Review of Systems    All other systems negative, except as noted in history of present illness    Historical Information   Past Medical History:   Diagnosis Date    Atrial tachycardia, paroxysmal ([address] ) 01/09/2018    from allscripts chart    Bradycardia 01/09/2018    from allscripts chart    Dyslipidemia     Essential hypertension     Impaired fasting glucose     Premature atrial contractions     Sinus bradycardia-tachycardia syndrome [facility])      Past Surgical History:   Procedure Laterality Date    CHOLECYSTECTOMY LAPAROSCOPIC N/A 2/3/2020    Procedure: CHOLECYSTECTOMY LAPAROSCOPIC;  Surgeon: Ramon Opitz, DO;  [facility];  Service: General    CT GUIDED FINE NEEDLE ASPIRATION (ALL INC)  2/28/2020     Social History     Substance and Sexual Activity   Alcohol Use Yes    Alcohol/week: 2 0 standard drinks    Types: 2 Glasses of wine per week    Comment: a day      Social History     Substance and Sexual Activity   Drug Use No     Social History     Tobacco Use   Smoking Status Never Smoker   Smokeless Tobacco Never Used       Family History:  Family History   Problem Relation Age of Onset    Microcephaly Father     Heart attack Father          Meds/Allergies     Prior to Admission medications    Medication Sig Start Date End Date Taking?  Authorizing Provider   Acai 500 MG CAPS Take by mouth   Yes Historical Provider, MD   atorvastatin (LIPITOR) 10 mg tablet Take 1 tablet (10 mg total) by mouth daily 12/18/19  Yes Juan Magallanes MD   Eliquis 5 MG TAKE 1 TABLET TWICE A DAY 1/7/21  Yes Juan Magallanes MD   glucosamine-chondroitin 500-400 MG tablet Take 1 tablet by mouth 3 (three) times a day   Yes Historical Provider, MD   lisinopril (ZESTRIL) 20 mg tablet Take 1 tablet by mouth daily   Yes Historical Provider, MD   metoprolol succinate (TOPROL-XL) 25 mg 24 hr tablet TAKE 1 TABLET DAILY 8/17/21  Yes Juan Magallanes MD   Misc Natural Products (TART CHERRY ADVANCED PO) Take by mouth   Yes Historical Provider, MD   Multiple Vitamin (DAILY VALUE MULTIVITAMIN) TABS Take 1 tablet by mouth daily   Yes Historical Provider, MD   Probiotic Product (PROBIOTIC-10) CAPS Take by mouth   Yes Historical Provider, MD   tamsulosin (FLOMAX) 0 4 mg Take 0 4 mg by mouth   Yes Historical Provider, MD   ondansetron (ZOFRAN-ODT) 4 mg disintegrating tablet Take 1 tablet (4 mg total) by mouth every 8 (eight) hours as needed for nausea or vomiting for up to 7 days 1/1/21 1/8/21  Zuri Nelson MD       No Known Allergies      Vitals:    09/09/21 0759   BP: 122/78   BP Location: Right arm   Patient Position: Sitting   Cuff Size: Standard   Pulse: 71   Temp: 97 8 °F (36 6 °C)   SpO2: 96%   Weight: 92 1 kg (203 lb)   Height: 5' 7" (1 702 m)       Body mass index is 31 79 kg/m²  1 pound weight gain in approximately six months and 6 pound weight gain in approximately 1+ year    Physical Exam:    General Appearance:  Alert, cooperative, no distress, appears stated age  And is mildly obese  Head:  Normocephalic, without obvious abnormality, atraumatic   Eyes:  PERRL, conjunctiva/corneas clear, EOM's intact,   both eyes   Ears:  Normal TM's and external ear canals, both ears   Nose: Nares normal, septum midline, mucosa normal, no drainage or sinus tenderness   Throat: Lips, mucosa, and tongue normal; teeth and gums normal   Neck: Supple, symmetrical, trachea midline, no adenopathy, thyroid: not enlarged, symmetric, no tenderness/mass/nodules, no carotid bruit or JVD   Back:   Symmetric, no curvature, ROM normal, no CVA tenderness   Lungs:   Clear to auscultation bilaterally, respirations unlabored   Chest Wall:  No tenderness or deformity   Heart:  Regular rate and rhythm, S1, S2 normal, no murmur, rub or gallop   Abdomen:   Soft, non-tender, bowel sounds active all four quadrants,  no masses, no organomegaly  Mild to moderate abdominal obesity noted     Extremities: Extremities normal, atraumatic, no cyanosis or edema   Pulses: 2+ and symmetric   Skin: Skin showed normal color, texture, turgor and no rashes or lesions   Lymph nodes: Cervical, supraclavicular, and axillary nodes normal   Neurologic: Normal         Cardiographics    ECG 09/09/21:    100% atrial paced rhythm with prolonged AV conduction   Nonspecific T abnormality laterally   Leftward frontal plane QRS axis   New atrial pacing with suppression of PACs and less T inversion in V5 - V6 since 11/17/2020    Remote [employer] pacemaker interrogation 09/07/2021:    Seven episodes of an SVT, most recent 16 beats  Two episodes of fast heart action with maximum duration SVT 53 seconds  Adequate battery voltage is normal lead parameters with 88% atrial pacing  Normal device function    IMAGING:    No Chest XR results available for this patient            LAB REVIEW:      Lab Results   Component Value Date    SODIUM 135 (L) 01/01/2021    K 4 1 01/01/2021     01/01/2021    CO2 27 01/01/2021    BUN 16 01/01/2021    CREATININE 0 95 01/01/2021    GLUF 104 (H) 08/29/2019    CALCIUM 9 1 01/01/2021    AST 17 01/01/2021    ALT 32 01/01/2021    ALKPHOS 61 01/01/2021    EGFR 77 01/01/2021     Lab Results   Component Value Date    CHOLESTEROL 159 08/29/2019    CHOLESTEROL 149 09/17/2018    CHOLESTEROL 154 02/06/2018     Lab Results   Component Value Date    HDL 47 08/29/2019    HDL 49 09/17/2018    HDL 51 02/06/2018       Lab Results   Component Value Date    LDLCALC 93 08/29/2019    LDLCALC 78 09/17/2018    LDLCALC 85 02/06/2018     No components found for: [facility]  Lab Results   Component Value Date    TRIG 94 08/29/2019    TRIG 112 09/17/2018    TRIG 88 02/06/2018               Talha Montiel MD

## 2021-09-10 ENCOUNTER — TELEPHONE (OUTPATIENT)
Dept: CARDIOLOGY CLINIC | Facility: CLINIC | Age: 76
End: 2021-09-10

## 2021-09-10 NOTE — RESULT ENCOUNTER NOTE
Hemoglobin A1c from 09/09/2021 was 5 8% with estimated average blood sugar of 120, indicating a prediabetic state  Recommend weight loss and a lower carbohydrate diet  No medication necessary for this at the present time

## 2021-09-10 NOTE — TELEPHONE ENCOUNTER
----- Message from Sami Reynolds MD sent at 9/9/2021  5:28 PM EDT -----   Blood chemistry from 09/09/2021 showed mild dehydration from fasting without water drinking, slightly above normal blood sugar of 106, not of major concern  Lipid panel showed good cholesterol but elevated triglycerides, probably related to weight gain and dietary indiscretion  Recommend following a heart healthy diet and losing some weight

## 2021-09-10 NOTE — TELEPHONE ENCOUNTER
Spoke with patient, message given per Dr Lidia Wilson  Patient verbalized understanding  Heart Healthy diet mailed to patient  Patient states he will drink more water

## 2021-09-13 ENCOUNTER — TELEPHONE (OUTPATIENT)
Dept: CARDIOLOGY CLINIC | Facility: CLINIC | Age: 76
End: 2021-09-13

## 2021-09-13 NOTE — TELEPHONE ENCOUNTER
----- Message from Sonja Tate MD sent at 9/10/2021  6:23 PM EDT -----  Hemoglobin A1c from 09/09/2021 was 5 8% with estimated average blood sugar of 120, indicating a prediabetic state  Recommend weight loss and a lower carbohydrate diet  No medication necessary for this at the present time

## 2021-09-30 ENCOUNTER — REMOTE DEVICE CLINIC VISIT (OUTPATIENT)
Dept: CARDIOLOGY CLINIC | Facility: CLINIC | Age: 76
End: 2021-09-30
Payer: MEDICARE

## 2021-09-30 DIAGNOSIS — Z95.0 PRESENCE OF PERMANENT CARDIAC PACEMAKER: Primary | ICD-10-CM

## 2021-09-30 PROCEDURE — 93294 REM INTERROG EVL PM/LDLS PM: CPT | Performed by: INTERNAL MEDICINE

## 2021-09-30 PROCEDURE — 93296 REM INTERROG EVL PM/IDS: CPT | Performed by: INTERNAL MEDICINE

## 2021-09-30 NOTE — PROGRESS NOTES
Results for orders placed or performed in visit on 09/30/21   Cardiac EP device report    Narrative    MDT [address] IS MRI CONDITIONAL  CARELINK TRANSMISSION: BATTERY VOLTAGE ADEQUATE  (13 1 YRS) AP 90%  1%  ALL AVAILABLE LEAD PARAMETERS WITHIN NORMAL LIMITS  NO SIGNIFICANT HIGH RATE EPISODES  1 FAST A & V EPISODES DETECTED  PATIENT IS ON ELIQUIS AND METOPROLOL SUCC; EF 55% (2020)   NORMAL DEVICE FUNCTION --STEELE

## 2022-01-07 ENCOUNTER — REMOTE DEVICE CLINIC VISIT (OUTPATIENT)
Dept: CARDIOLOGY CLINIC | Facility: CLINIC | Age: 77
End: 2022-01-07
Payer: MEDICARE

## 2022-01-07 DIAGNOSIS — Z95.0 PRESENCE OF PERMANENT CARDIAC PACEMAKER: Primary | ICD-10-CM

## 2022-01-07 PROCEDURE — 93296 REM INTERROG EVL PM/IDS: CPT | Performed by: INTERNAL MEDICINE

## 2022-01-07 PROCEDURE — 93294 REM INTERROG EVL PM/LDLS PM: CPT | Performed by: INTERNAL MEDICINE

## 2022-01-07 NOTE — PROGRESS NOTES
Results for orders placed or performed in visit on 01/07/22   Cardiac EP device report    Narrative    MDT DUAL CHAMBER PM/ACTIVE SYSTEM IS MRI CONDITIONAL  CARELINK TRANSMISSION: BATTERY VOLTAGE ADEQUATE  (12 8 YRS) AP 72%  <1%  ALL AVAILABLE LEAD PARAMETERS WITHIN NORMAL LIMITS  14 [address] 7 BEATS @ 300ms  PATIENT IS ON ELIQUIS AND METOPROLOL SUCC; EF 55% (2020)   NORMAL DEVICE FUNCTION --STEELE

## 2022-01-31 DIAGNOSIS — I48.92 PAROXYSMAL ATRIAL FLUTTER (HCC): ICD-10-CM

## 2022-01-31 RX ORDER — APIXABAN 5 MG/1
TABLET, FILM COATED ORAL
Qty: 180 TABLET | Refills: 3 | Status: SHIPPED | OUTPATIENT
Start: 2022-01-31

## 2022-03-09 ENCOUNTER — OFFICE VISIT (OUTPATIENT)
Dept: CARDIOLOGY CLINIC | Facility: CLINIC | Age: 77
End: 2022-03-09
Payer: MEDICARE

## 2022-03-09 VITALS
DIASTOLIC BLOOD PRESSURE: 80 MMHG | OXYGEN SATURATION: 96 % | BODY MASS INDEX: 32.18 KG/M2 | WEIGHT: 205 LBS | SYSTOLIC BLOOD PRESSURE: 122 MMHG | HEIGHT: 67 IN | TEMPERATURE: 98 F | HEART RATE: 78 BPM

## 2022-03-09 DIAGNOSIS — Z95.0 PRESENCE OF PERMANENT CARDIAC PACEMAKER: ICD-10-CM

## 2022-03-09 DIAGNOSIS — I49.5 TACHY-BRADY SYNDROME (HCC): ICD-10-CM

## 2022-03-09 DIAGNOSIS — R42 EPISODIC LIGHTHEADEDNESS: ICD-10-CM

## 2022-03-09 DIAGNOSIS — I49.3 ASYMPTOMATIC PVCS: ICD-10-CM

## 2022-03-09 DIAGNOSIS — E66.9 CLASS 1 OBESITY: ICD-10-CM

## 2022-03-09 DIAGNOSIS — I49.1 PREMATURE ATRIAL CONTRACTIONS: ICD-10-CM

## 2022-03-09 DIAGNOSIS — I10 ESSENTIAL HYPERTENSION: Primary | ICD-10-CM

## 2022-03-09 DIAGNOSIS — I48.92 PAROXYSMAL ATRIAL FLUTTER (HCC): ICD-10-CM

## 2022-03-09 DIAGNOSIS — E78.5 DYSLIPIDEMIA: ICD-10-CM

## 2022-03-09 DIAGNOSIS — R73.01 IMPAIRED FASTING GLUCOSE: ICD-10-CM

## 2022-03-09 PROCEDURE — 99214 OFFICE O/P EST MOD 30 MIN: CPT | Performed by: INTERNAL MEDICINE

## 2022-03-09 PROCEDURE — 93000 ELECTROCARDIOGRAM COMPLETE: CPT | Performed by: INTERNAL MEDICINE

## 2022-03-09 RX ORDER — AMLODIPINE BESYLATE 5 MG/1
5 TABLET ORAL DAILY
COMMUNITY
Start: 2022-02-23 | End: 2022-03-25

## 2022-03-09 NOTE — PATIENT INSTRUCTIONS
1  Continue current medication  2  Suggest updating home blood pressures with measurement for four consecutive days, morning and evening, three readings at a time 1-2  minutes apart, recorded on blood pressure data sheet given to you by Dr Vernon Mckeon  You can make extra copies of the blank blood pressure data sheet for future use  3   Take blood pressures after resting for at least 15 minutes while seated in a chair or at a table with arm supported on arm rest or table  Do 3 readings, one after the other, both morning and evening for 4 consecutive days  4   Write down each and every reading with date and time and get list of readings to the office of Dr Vernon Mckeon for his review  You may scan them or photograph the blood pressure data sheet and e-mail to:  Jose Luis Irvin @[email or web address] org   5   Our office will contact you with further instructions and the results of this review  6  Cardiology followup approximately six months with EKG

## 2022-03-09 NOTE — PROGRESS NOTES
Office Cardiology Progress Note  Shoshana James 76 y o  male MRN: [id number]  03/09/22  11:20 AM      ASSESSMENT:      1  Suppressed benign positional vertigo, recurrent, and present for approximately 7 months, but last severe episode was on 01/01/2021 with none since  Persistent nocturnal lightheadedness with nocturnal ataxia and recently mildly abnormal MRI of brain on 03/08/2022   2  Controlled essential hypertension with average home reading of 126/82 nine months ago  Controlled in office today  3  Status post Medtronic Allyson XT DR MRI permanent pacemaker implant 08/18/2020 with normal device function as of 01/07/2022  and unofficially as of 10/03/2020 with history of  symptomatic sick sinus syndrome with occasional brief lightheadedness, syncope in August, 2020, known  frequent atypical flutter with 3:1 AV block and previously showing   intermittent marked sinus bradycardia   Prior history of asymptomatic tachycardia-bradycardia phenomenon   Seen by Dr Marylen Clinton and recommended to stay on Eliquis  4  Chronic PACs with 4 8% frequency on 02/06/2018 Holter monitor and known atrial tachycardia runs of up to 46 beats with fastest rate during  bpm   20% atrial fibrillation/flutter burden on Zio patch 03/09/2020 with five runs of wide complex tachycardia, moderate PACs and rare PVCs with no symptoms   New mild first-degree AV block and atrial pacing as of 01/01/2021  Longest recent run of SVT was 53 seconds as of 09/07/2021   5  Hypertensive heart disease without heart failure     6  Chronically impaired fasting glucose with most recent fasting blood glucose 106 on 09/09/2021   7  History of prior hyperkalemia and azotemia on ACE inhibition, currently suppressed     8  History of right renal mass cryoablation 5/17/12 with previous follow-up at [facility], but patient now discharged from follow-up     9  Controlled dyslipidemia as of 09/09/2021  10  Controlled recurrent diverticulitis     11  Stable BPH and urinary frequency/nocturia     12  Chronic obesity, class I, with a 8 pound weight gain  in approximately  1 5+ years  11  Normal nuclear stress study on 10/4/16 with 70% LVEF    12  Status post  laparoscopic cholecystectomy at 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  Plan       Patient Instructions     1  Continue current medication  2  Suggest updating home blood pressures with measurement for four consecutive days, morning and evening, three readings at a time 1-2  minutes apart, recorded on blood pressure data sheet given to you by Dr Casie Michael  You can make extra copies of the blank blood pressure data sheet for future use  3   Take blood pressures after resting for at least 15 minutes while seated in a chair or at a table with arm supported on arm rest or table  Do 3 readings, one after the other, both morning and evening for 4 consecutive days  4   Write down each and every reading with date and time and get list of readings to the office of Dr Casie Michael for his review  You may scan them or photograph the blood pressure data sheet and e-mail to:  Autumn Irvin @[email or web address] org   5   Our office will contact you with further instructions and the results of this review  6  Cardiology followup approximately six months with EKG  HPI    This 76 y o  male  denies new cardiopulmonary and medical symptoms  He continues to complain of dizziness with associated ataxia after he goes to bed at night with no significant problem with those symptoms during the daytime  He denies any vertiginous component or rotary sensation and denies nausea and vomiting      The patient recently switched primary care providers to Dr Bethany Van, who added amlodipine 2 5 milligrams daily because of uncontrolled hypertension on 01/21/2022 and boost dose to 5 milligrams daily as of 02/23/2022  Patient has an approximate one month follow-up  His new primary care provider also ordered a brain MRI done on 03/08/2022  Results are listed below  The patient is being seen in follow-up of the below listed diagnoses  Encounter Diagnoses   Name Primary?  Essential hypertension Yes    Paroxysmal atrial flutter (HCC)     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HCC)     Premature atrial contractions     Asymptomatic PVCs     Episodic lightheadedness     Class 1 obesity     Presence of permanent cardiac pacemaker     Dyslipidemia     Impaired fasting glucose         Review of Systems    All other systems negative, except as noted in history of present illness    Historical Information   Past Medical History:   Diagnosis Date    Atrial tachycardia, paroxysmal ([address] ) 01/09/2018    from allscripts chart    Bradycardia 01/09/2018    from allscripts chart    Dyslipidemia     Essential hypertension     Impaired fasting glucose     Premature atrial contractions     Sinus bradycardia-tachycardia syndrome (HCC)      Past Surgical History:   Procedure Laterality Date    CHOLECYSTECTOMY LAPAROSCOPIC N/A 2/3/2020    Procedure: CHOLECYSTECTOMY LAPAROSCOPIC;  Surgeon: Cathie Hassan DO;  Location: [facility];  Service: General    CT GUIDED FINE NEEDLE ASPIRATION (ALL INC)  2/28/2020     Social History     Substance and Sexual Activity   Alcohol Use Yes    Alcohol/week: 2 0 standard drinks    Types: 2 Glasses of wine per week    Comment: a day      Social History     Substance and Sexual Activity   Drug Use No     Social History     Tobacco Use   Smoking Status Never Smoker   Smokeless Tobacco Never Used       Family History:  Family History   Problem Relation Age of Onset    Microcephaly Father     Heart attack Father          Meds/Allergies     Prior to Admission medications    Medication Sig Start Date End Date Taking?  Authorizing Provider   Acai 500 MG CAPS Take by mouth   Yes Historical Provider, MD   amLODIPine (NORVASC) 5 mg tablet Take 5 mg by mouth daily 2/23/22 3/25/22 Yes Historical Provider, MD   atorvastatin (LIPITOR) 10 mg tablet Take 1 tablet (10 mg total) by mouth daily 12/18/19  Yes Julia Roy, MD   Eliquis 5 MG TAKE 1 TABLET TWICE A DAY 1/31/22  Yes Julia Roy, MD   glucosamine-chondroitin 500-400 MG tablet Take 1 tablet by mouth 3 (three) times a day   Yes Historical Provider, MD   lisinopril (ZESTRIL) 20 mg tablet Take 1 tablet by mouth daily   Yes Historical Provider, MD   metoprolol succinate (TOPROL-XL) 25 mg 24 hr tablet TAKE 1 TABLET DAILY 8/17/21  Yes Julia Roy MD   Misc Natural Products (TART CHERRY ADVANCED PO) Take by mouth   Yes Historical Provider, MD   Multiple Vitamin (DAILY VALUE MULTIVITAMIN) TABS Take 1 tablet by mouth daily   Yes Historical Provider, MD   Probiotic Product (PROBIOTIC-10) CAPS Take by mouth   Yes Historical Provider, MD   tamsulosin (FLOMAX) 0 4 mg Take 0 4 mg by mouth   Yes Historical Provider, MD   ondansetron (ZOFRAN-ODT) 4 mg disintegrating tablet Take 1 tablet (4 mg total) by mouth every 8 (eight) hours as needed for nausea or vomiting for up to 7 days 1/1/21 1/8/21  Alison Broderick MD       No Known Allergies      Vitals:    03/09/22 0954   BP: 122/80   BP Location: Left arm   Patient Position: Sitting   Cuff Size: Large   Pulse: 78   Temp: 98 °F (36 7 °C)   SpO2: 96%   Weight: 93 kg (205 lb)   Height: 5' 7" (1 702 m)       Body mass index is 32 11 kg/m²  2 pound weight gain in approximately six months and 8 pound weight gain in approximately 1 5+ years    Physical Exam:    General Appearance:  Alert, cooperative, no distress, appears stated age and is mildly obese     Head:  Normocephalic, without obvious abnormality, atraumatic   Eyes:  PERRL, conjunctiva/corneas clear, EOM's intact,   both eyes   Ears:  Normal TM's and external ear canals, both ears   Nose: Nares normal, septum midline, mucosa normal, no drainage or sinus tenderness   Throat: Lips, mucosa, and tongue normal; teeth and gums normal   Neck: Supple, symmetrical, trachea midline, no adenopathy, thyroid: not enlarged, symmetric, no tenderness/mass/nodules, no carotid bruit or JVD   Back:   Symmetric, no curvature, ROM normal, no CVA tenderness   Lungs:   Clear to auscultation bilaterally, respirations unlabored   Chest Wall:  No tenderness or deformity   Heart:  Regular rate and rhythm with moderately frequent extrasystoles heard, S1, S2 normal, no murmur, rub or gallop   Abdomen:   Soft, non-tender, bowel sounds active all four quadrants,  no masses, no organomegaly  Moderate abdominal obesity noted  Extremities: Extremities normal, atraumatic, no cyanosis or edema   Pulses: 2+ and symmetric   Skin: Skin showed normal color, texture, turgor and no rashes or lesions   Lymph nodes: Cervical, supraclavicular, and axillary nodes normal   Neurologic: Normal         Cardiographics    ECG 03/09/22:    Normal sinus rhythm at 75 bpm  Two unifocal PVCs  Two premature atrial contractions  LAFB   Nonspecific T abnormalities laterally  Suppressed atrial pacing with new PVCs and PACs compared to 09/09/2021      [employer] remote pacemaker interrogation 01/07/2022:    Underlying rhythm was AP/VS at 88 ppm  Adequate battery voltage estimated at 12 8 years   72% atrial paced and less than 1% ventricular paced  Normal lead parameters   Fourteen VHR episodes, most recent seven beats at 300 MS  Normal device function      IMAGING:    No Chest XR results available for this patient  MRI of brain 03/08/2022:    Findings: There is no restricted diffusion to suggest acute infarct  There is an area of   susceptibility in the left hamlet, which may reflect a small cavernoma or sequela   of prior nonspecific microhemorrhage   Additional small foci of susceptibility   are noted in the bilateral basal ganglia, the right thalamus and the bilateral   medial temporal lobes  There is global parenchymal volume loss, with resultant   prominence of the ventricles and sulci  There is no mass-effect or shift of the   midline structures  Patchy and focal areas of T2/FLAIR hyperintensity in the   periventricular and subcortical white matter bilaterally are nonspecific, though   commonly reflect chronic microangiopathic ischemic change   Postcontrast images   demonstrate no definite abnormal parenchymal or meningeal enhancement  Dedicated imaging of the internal auditory canals demonstrates no evidence of   cerebellopontine angle mass  The seventh and eighth cranial nerve complexes are   unremarkable bilaterally  The inner ear structures appear well formed   bilaterally  No abnormal enhancement is identified  Imaged orbits are grossly unremarkable  There is mild mucosal thickening in the   ethmoid air cells  Mucous retention cysts in the right maxillary sinus  Minimal   inferior left mastoid effusion  IMPRESSION:   Impression:   1  No cerebellopontine angle mass  2  No evidence of acute infarct  Additional findings as   above         LAB REVIEW:      Lab Results   Component Value Date    SODIUM 134 (L) 09/09/2021    K 4 5 09/09/2021     09/09/2021    CO2 25 09/09/2021    BUN 22 09/09/2021    CREATININE 1 03 09/09/2021    GLUF 106 (H) 09/09/2021    CALCIUM 9 1 09/09/2021    AST 20 09/09/2021    ALT 43 09/09/2021    ALKPHOS 60 09/09/2021    EGFR 69 09/09/2021     Lab Results   Component Value Date    CHOLESTEROL 158 09/09/2021    CHOLESTEROL 159 08/29/2019    CHOLESTEROL 149 09/17/2018     Lab Results   Component Value Date    HDL 39 (L) 09/09/2021    HDL 47 08/29/2019    HDL 49 09/17/2018       Lab Results   Component Value Date    LDLCALC 84 09/09/2021    LDLCALC 93 08/29/2019    LDLCALC 78 09/17/2018     No components found for: [facility]  Lab Results   Component Value Date    TRIG 173 (H) 09/09/2021    TRIG 94 08/29/2019    TRIG 112 09/17/2018     CBC, UA, ESR 01/24/2022:   All Normal  Lipid panel 01/24/2022:  Cholesterol 166, TG 89, HDL 54, LDL 94  A1c and estimated average glucose 01/24/2022:  5 6% and 1319 Emely Garcia MD

## 2022-03-09 NOTE — LETTER
March 9, 2022     Elizabeth Skinner  [address] [place] [address] Drive  [address] [place] [address]    Patient: Duke Lincoln   YOB: 1945   Date of Visit: 3/9/2022       Dear Dr England Model:    Thank you for referring Duke Lincoln to me for evaluation  Below are my notes for this consultation  If you have questions, please do not hesitate to call me  I look forward to following your patient along with you           Sincerely,        Pavan oSlis MD        CC: No Recipients

## 2022-04-15 ENCOUNTER — REMOTE DEVICE CLINIC VISIT (OUTPATIENT)
Dept: CARDIOLOGY CLINIC | Facility: CLINIC | Age: 77
End: 2022-04-15
Payer: MEDICARE

## 2022-04-15 DIAGNOSIS — Z95.0 PRESENCE OF PERMANENT CARDIAC PACEMAKER: Primary | ICD-10-CM

## 2022-04-15 PROCEDURE — 93296 REM INTERROG EVL PM/IDS: CPT | Performed by: INTERNAL MEDICINE

## 2022-04-15 PROCEDURE — 93294 REM INTERROG EVL PM/LDLS PM: CPT | Performed by: INTERNAL MEDICINE

## 2022-04-15 NOTE — PROGRESS NOTES
MDT DUAL CHAMBER PM/ACTIVE SYSTEM IS MRI CONDITIONAL   CARELINK TRANSMISSION: BATTERY VOLTAGE ADEQUATE  (12 6 YRS) AP 61%  <1%  ALL AVAILABLE LEAD PARAMETERS WITHIN NORMAL LIMITS   10 VHR EPISODES DETECTED SVT NOTED PATIENT IS ON ELIQUIS AND METOPROLOL SUCC; EF 55% (2020)   NORMAL DEVICE FUNCTION --STEELE

## 2022-05-02 ENCOUNTER — IN-CLINIC DEVICE VISIT (OUTPATIENT)
Dept: CARDIOLOGY CLINIC | Facility: CLINIC | Age: 77
End: 2022-05-02
Payer: MEDICARE

## 2022-05-02 DIAGNOSIS — Z95.0 PRESENCE OF PERMANENT CARDIAC PACEMAKER: Primary | ICD-10-CM

## 2022-05-02 PROCEDURE — 93280 PM DEVICE PROGR EVAL DUAL: CPT | Performed by: INTERNAL MEDICINE

## 2022-05-02 NOTE — PROGRESS NOTES
MDT DUAL CHAMBER PM/ACTIVE SYSTEM IS MRI CONDITIONAL   DEVICE INTERROGATED IN THE [place] OFFICE:  BATTERY VOLTAGE ADEQUATE (12 5 YR)   AP 67 1%  0 1%    ALL LEAD PARAMETERS WITHIN NORMAL LIMITS   2 VT-NS EPISODES WITH EGMS SHOWING PAT (13 @ 160 BPM, 6 @ 188 BPM)   NO PROGRAMMING CHANGES MADE TO DEVICE PARAMETERS   NORMAL DEVICE FUNCTION   RG

## 2022-05-03 ENCOUNTER — DOCUMENTATION (OUTPATIENT)
Dept: CARDIOLOGY CLINIC | Facility: CLINIC | Age: 77
End: 2022-05-03

## 2022-05-03 NOTE — PROGRESS NOTES
Message sent to patient on 05/03/2022:    Paula Matute,    Your average blood pressure reading for the above time frame was excellent at 108/70  Let us know if you have any concerns or questions      Dr Luis Alberto Bains

## 2022-07-11 ENCOUNTER — RA CDI HCC (OUTPATIENT)
Dept: OTHER | Facility: HOSPITAL | Age: 77
End: 2022-07-11

## 2022-07-11 NOTE — PROGRESS NOTES
[address]  coding opportunities       Chart reviewed, no opportunity found: CHART REVIEWED, NO OPPORTUNITY FOUND   CQDOC:Dx assessed and on BPA     Patients Insurance     Medicare Insurance: Medicare

## 2022-08-04 ENCOUNTER — REMOTE DEVICE CLINIC VISIT (OUTPATIENT)
Dept: CARDIOLOGY CLINIC | Facility: CLINIC | Age: 77
End: 2022-08-04
Payer: MEDICARE

## 2022-08-04 DIAGNOSIS — Z95.0 CARDIAC PACEMAKER IN SITU: Primary | ICD-10-CM

## 2022-08-04 PROCEDURE — 93296 REM INTERROG EVL PM/IDS: CPT | Performed by: INTERNAL MEDICINE

## 2022-08-04 PROCEDURE — 93294 REM INTERROG EVL PM/LDLS PM: CPT | Performed by: INTERNAL MEDICINE

## 2022-08-04 NOTE — PROGRESS NOTES
Results for orders placed or performed in visit on 08/04/22   Cardiac EP device report    Narrative    MDT [address] IS MRI CONDITIONAL  CARELINK TRANSMISSION: BATTERY STATUS "12 YRS " AP 78%  0%  ALL AVAILABLE LEAD PARAMETERS WITHIN NORMAL LIMITS  5 VHRS NOTED & 1 FAST A/V NOTED; AVAIL EGRAMS PRESENT AS NSVT & SVT  PT ON ELIQUIS & METO SUCC  EF 55% (2020)  NORMAL DEVICE FUNCTION   NC

## 2022-08-12 DIAGNOSIS — I48.92 PAROXYSMAL ATRIAL FLUTTER (HCC): ICD-10-CM

## 2022-08-12 RX ORDER — METOPROLOL SUCCINATE 25 MG/1
TABLET, EXTENDED RELEASE ORAL
Qty: 90 TABLET | Refills: 3 | Status: SHIPPED | OUTPATIENT
Start: 2022-08-12

## 2022-09-09 ENCOUNTER — OFFICE VISIT (OUTPATIENT)
Dept: CARDIOLOGY CLINIC | Facility: CLINIC | Age: 77
End: 2022-09-09
Payer: MEDICARE

## 2022-09-09 VITALS
HEART RATE: 72 BPM | SYSTOLIC BLOOD PRESSURE: 120 MMHG | WEIGHT: 205 LBS | TEMPERATURE: 97.8 F | DIASTOLIC BLOOD PRESSURE: 72 MMHG | OXYGEN SATURATION: 97 % | BODY MASS INDEX: 32.18 KG/M2 | HEIGHT: 67 IN

## 2022-09-09 DIAGNOSIS — E78.5 DYSLIPIDEMIA: ICD-10-CM

## 2022-09-09 DIAGNOSIS — I49.5 SICK SINUS SYNDROME (HCC): ICD-10-CM

## 2022-09-09 DIAGNOSIS — E66.9 CLASS 1 OBESITY: ICD-10-CM

## 2022-09-09 DIAGNOSIS — I49.3 ASYMPTOMATIC PVCS: ICD-10-CM

## 2022-09-09 DIAGNOSIS — I10 ESSENTIAL HYPERTENSION: ICD-10-CM

## 2022-09-09 DIAGNOSIS — R73.01 IMPAIRED FASTING GLUCOSE: ICD-10-CM

## 2022-09-09 DIAGNOSIS — I11.9 HYPERTENSIVE HEART DISEASE WITHOUT HEART FAILURE: ICD-10-CM

## 2022-09-09 DIAGNOSIS — I49.5 TACHY-BRADY SYNDROME (HCC): ICD-10-CM

## 2022-09-09 DIAGNOSIS — I49.1 PREMATURE ATRIAL CONTRACTIONS: ICD-10-CM

## 2022-09-09 DIAGNOSIS — Z95.0 PRESENCE OF PERMANENT CARDIAC PACEMAKER: ICD-10-CM

## 2022-09-09 DIAGNOSIS — I48.92 PAROXYSMAL ATRIAL FLUTTER (HCC): Primary | ICD-10-CM

## 2022-09-09 PROCEDURE — 93000 ELECTROCARDIOGRAM COMPLETE: CPT | Performed by: INTERNAL MEDICINE

## 2022-09-09 PROCEDURE — 99214 OFFICE O/P EST MOD 30 MIN: CPT | Performed by: INTERNAL MEDICINE

## 2022-09-09 NOTE — PATIENT INSTRUCTIONS
Continue current medication  Cardiology follow-up approximately six months with EKG and other lab work will be reviewed at that time

## 2022-09-09 NOTE — PROGRESS NOTES
Office Cardiology Progress Note  Zena Conde 76 y o  male MRN: [id number]  09/09/22  10:47 AM      ASSESSMENT:      1  Suppressed benign positional vertigo, recurrent, and present for approximately 7 months, but last severe episode was on 01/01/2021 with none since  Persistent nocturnal lightheadedness with nocturnal ataxia and recently mildly abnormal MRI of brain on 03/08/2022   2  Controlled essential hypertension with average home reading of 108/70 5 months ago  Controlled in office today  3  Status post [employer] XT DR MRI permanent pacemaker implant 08/18/2020 with normal device function as of 08/04/22  and unofficially as of 10/03/2020 with history of  symptomatic sick sinus syndrome with occasional brief lightheadedness, syncope in August, 2020, known  frequent atypical flutter with 3:1 AV block and previously showing   intermittent marked sinus bradycardia   Prior history of asymptomatic tachycardia-bradycardia phenomenon   Seen by Dr Yesy Esqueda and recommended to stay on Eliquis  Now with 100% atrial paced rhythm  Asymptomatic PVC noted  4  Chronic PACs with 4 8% frequency on 02/06/2018 Holter monitor and known atrial tachycardia runs of up to 46 beats with fastest rate during  bpm   20% atrial fibrillation/flutter burden on Zio patch 03/09/2020 with five runs of wide complex tachycardia, moderate PACs and rare PVCs with no symptoms   New mild first-degree AV block and atrial pacing as of 01/01/2021  Longest recent run of SVT was 53 seconds as of 09/07/2021   5  Hypertensive heart disease without heart failure     6  Chronically impaired fasting glucose with most recent fasting blood glucose 113 on 07/26/2022 with A1c of 5 7% on 08/04/2022   7  History of prior hyperkalemia and azotemia on ACE inhibition, currently suppressed     8  History of right renal mass cryoablation 5/17/12 with [facility], but patient now discharged from follow-up     9  Controlled dyslipidemia as of  07/26/2022  10  Controlled recurrent diverticulitis     11  Stable BPH and urinary frequency/nocturia     12  Chronic obesity, class I, with a 8 pound weight gain  in approximately  2+ years  11  Normal nuclear stress study on 10/4/16 with 70% LVEF    12  Status post  laparoscopic cholecystectomy at [facility] for acute cholecystitis on 02/03/2020 and subsequent IR drainage of collection of blood from cholecystectomy bed on 02/29/2020  13  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  Plan       Patient Instructions     1  Continue current medication  2  Cardiology follow-up approximately six months with EKG and other lab work will be reviewed at that time  HPI    This 76 y o  male  denies new cardiopulmonary and medical symptoms  The patient spends quite a few weekends at his condominium in [place], [place], where he walks on the [facility], sits by his pool, and eats out at various restaurants  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     Paroxysmal atrial flutter (HCC) Yes    Presence of permanent cardiac pacemaker     Essential hypertension     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HCC)     Dyslipidemia     Sick sinus syndrome (HCC)     Asymptomatic PVCs     Premature atrial contractions     Hypertensive heart disease without heart failure     Class 1 obesity     Impaired fasting glucose         Review of Systems    All other systems negative, except as noted in history of present illness    Historical Information   Past Medical History:   Diagnosis Date    Atrial tachycardia, paroxysmal ([facility] [address] ) 01/09/2018    from allscripts chart    Bradycardia 01/09/2018    from allscripts chart    Dyslipidemia     Essential hypertension     Impaired fasting glucose     Premature atrial contractions     Sinus bradycardia-tachycardia syndrome (HCC)      Past Surgical History:   Procedure Laterality Date    CHOLECYSTECTOMY LAPAROSCOPIC N/A 2/3/2020    Procedure: CHOLECYSTECTOMY LAPAROSCOPIC;  Surgeon: Simon Gonzalez DO;  [facility];  Service: General    CT GUIDED FINE NEEDLE ASPIRATION (ALL INC)  2/28/2020     Social History     Substance and Sexual Activity   Alcohol Use Yes    Alcohol/week: 2 0 standard drinks    Types: 2 Glasses of wine per week    Comment: a day      Social History     Substance and Sexual Activity   Drug Use No     Social History     Tobacco Use   Smoking Status Never Smoker   Smokeless Tobacco Never Used       Family History:  Family History   Problem Relation Age of Onset    Microcephaly Father     Heart attack Father          Meds/Allergies     Prior to Admission medications    Medication Sig Start Date End Date Taking?  Authorizing Provider   Acai 500 MG CAPS Take by mouth   Yes Historical Provider, MD   atorvastatin (LIPITOR) 10 mg tablet Take 1 tablet (10 mg total) by mouth daily 12/18/19  Yes Zarina Bell MD   Eliquis 5 MG TAKE 1 TABLET TWICE A DAY 1/31/22  Yes Zarina Bell MD   glucosamine-chondroitin 500-400 MG tablet Take 1 tablet by mouth 3 (three) times a day   Yes Historical Provider, MD   lisinopril (ZESTRIL) 20 mg tablet Take 1 tablet by mouth daily   Yes Historical Provider, MD   metoprolol succinate (TOPROL-XL) 25 mg 24 hr tablet TAKE 1 TABLET DAILY 8/12/22  Yes Zarina Bell MD   Misc Natural Products (TART CHERRY ADVANCED PO) Take by mouth   Yes Historical Provider, MD   Multiple Vitamin (DAILY VALUE MULTIVITAMIN) TABS Take 1 tablet by mouth daily   Yes Historical Provider, MD   Probiotic Product (PROBIOTIC-10) CAPS Take by mouth   Yes Historical Provider, MD   tamsulosin (FLOMAX) 0 4 mg Take 0 4 mg by mouth   Yes Historical Provider, MD   amLODIPine (NORVASC) 5 mg tablet Take 5 mg by mouth daily 2/23/22 3/25/22  Historical Provider, MD   ondansetron (ZOFRAN-ODT) 4 mg disintegrating tablet Take 1 tablet (4 mg total) by mouth every 8 (eight) hours as needed for nausea or vomiting for up to 7 days 1/1/21 1/8/21  Fermín Flores MD       No Known Allergies      Vitals:    09/09/22 1005   BP: 120/72   BP Location: Right arm   Patient Position: Sitting   Cuff Size: Standard   Pulse: 72   Temp: 97 8 °F (36 6 °C)   SpO2: 97%   Weight: 93 kg (205 lb)   Height: 5' 7" (1 702 m)       Body mass index is 32 11 kg/m²  No change in weight in approximately six months with 8 pound weight gain in approximately 2+ years    Physical Exam:    General Appearance:  Alert, cooperative, no distress, appears stated age and is mildly obese  Head:  Normocephalic, without obvious abnormality, atraumatic   Eyes:  PERRL, conjunctiva/corneas clear, EOM's intact,   both eyes   Ears:  Normal TM's and external ear canals, both ears   Nose: Nares normal, septum midline, mucosa normal, no drainage or sinus tenderness   Throat: Lips, mucosa, and tongue normal; teeth and gums normal   Neck: Supple, symmetrical, trachea midline, no adenopathy, thyroid: not enlarged, symmetric, no tenderness/mass/nodules, no carotid bruit or JVD   Back:   Symmetric, no curvature, ROM normal, no CVA tenderness   Lungs:   Clear to auscultation bilaterally, respirations unlabored   Chest Wall:  No tenderness or deformity  Pacemaker generator normally positioned with no unusual findings in left subclavian area  Heart:  Regular rate and rhythm, S1, S2 normal, no murmur, rub or gallop   Abdomen:   Soft, non-tender, bowel sounds active all four quadrants,  no masses, no organomegaly    Moderate abdominal obesity noted    Extremities: Extremities normal, atraumatic, no cyanosis or edema   Pulses: 2+ and symmetric   Skin: Skin showed normal color, texture, turgor and no rashes or lesions   Lymph nodes: Cervical, supraclavicular, and axillary nodes normal   Neurologic: Normal         Cardiographics    ECG 09/09/22:    100% atrial paced rhythm with prolonged AV conduction  Single PVC   LAFB   Abnormal ECG with new atrial pacing and no other changes since 03/09/2022    [employer] remote pacemaker interrogation 08/04/2022: Battery status approximately 12 years   A 78% atrial paced  Normal lead parameters   Five [employer] rated heart rates presenting as an SVT and SVT  Normal device function    IMAGING:    No Chest XR results available for this patient            LAB REVIEW:      Lab Results   Component Value Date    SODIUM 134 (L) 09/09/2021    K 4 5 09/09/2021     09/09/2021    CO2 25 09/09/2021    BUN 22 09/09/2021    CREATININE 1 03 09/09/2021    GLUF 106 (H) 09/09/2021    CALCIUM 9 1 09/09/2021    AST 20 09/09/2021    ALT 43 09/09/2021    ALKPHOS 60 09/09/2021    EGFR 69 09/09/2021     Lab Results   Component Value Date    CHOLESTEROL 158 09/09/2021    CHOLESTEROL 159 08/29/2019    CHOLESTEROL 149 09/17/2018     Lab Results   Component Value Date    HDL 39 (L) 09/09/2021    HDL 47 08/29/2019    HDL 49 09/17/2018       Lab Results   Component Value Date    LDLCALC 84 09/09/2021    LDLCALC 93 08/29/2019    LDLCALC 78 09/17/2018     No components found for: [facility]  Lab Results   Component Value Date    TRIG 173 (H) 09/09/2021    TRIG 94 08/29/2019    TRIG 112 09/17/2018     A1c and estimated average glucose 08/04/2022:  5 7% and 117  CBC 07/26/2022:  Normal except for platelets 220E  Lipid panel 07/26/2022:  Cholesterol 162 , HDL 52, LDL 87  CMP 07/26/2022:  Normal except glucose 113 and total bilirubin 1 4          Ilya Hackett MD

## 2022-11-03 ENCOUNTER — REMOTE DEVICE CLINIC VISIT (OUTPATIENT)
Dept: CARDIOLOGY CLINIC | Facility: CLINIC | Age: 77
End: 2022-11-03

## 2022-11-03 DIAGNOSIS — Z95.0 PRESENCE OF PERMANENT CARDIAC PACEMAKER: Primary | ICD-10-CM

## 2022-11-03 NOTE — PROGRESS NOTES
Results for orders placed or performed in visit on 11/03/22   Cardiac EP device report    Narrative    MDT DUAL CHAMBER PM/ACTIVE SYSTEM IS MRI CONDITIONAL  CARELINK TRANSMISSION: BATTERY VOLTAGE ADEQUATE  (11 8 YRS) AP 88%  <1%  ALL AVAILABLE LEAD PARAMETERS WITHIN NORMAL LIMITS  13 VHR EPISODES DETECTED SVT NOTED PATIENT IS ON ELIQUIS AND METOPROLOL SUCC; EF 55% (2020)   NORMAL DEVICE FUNCTION --STEELE

## 2023-01-16 ENCOUNTER — TELEPHONE (OUTPATIENT)
Dept: CARDIOLOGY CLINIC | Facility: CLINIC | Age: 78
End: 2023-01-16

## 2023-01-24 DIAGNOSIS — I48.92 PAROXYSMAL ATRIAL FLUTTER (HCC): ICD-10-CM

## 2023-01-24 RX ORDER — APIXABAN 5 MG/1
TABLET, FILM COATED ORAL
Qty: 180 TABLET | Refills: 3 | Status: SHIPPED | OUTPATIENT
Start: 2023-01-24

## 2023-02-16 ENCOUNTER — REMOTE DEVICE CLINIC VISIT (OUTPATIENT)
Dept: CARDIOLOGY CLINIC | Facility: CLINIC | Age: 78
End: 2023-02-16

## 2023-02-16 DIAGNOSIS — Z95.0 PRESENCE OF PERMANENT CARDIAC PACEMAKER: Primary | ICD-10-CM

## 2023-02-16 NOTE — PROGRESS NOTES
Results for orders placed or performed in visit on 02/16/23   Cardiac EP device report    Narrative    MDT DUAL CHAMBER PM/ACTIVE SYSTEM IS MRI CONDITIONAL  CARELINK TRANSMISSION: BATTERY VOLTAGE ADEQUATE  (11 6 YRS) AP 81%  <1%  ALL AVAILABLE LEAD PARAMETERS WITHIN NORMAL LIMITS  5 VHR EPISODES DETECTED SVT NOTED, HOWEVER EPISODE #154, NSVT 12 BEATS @ 300ms  PATIENT IS ON ELIQUIS AND METOPROLOL SUCC; EF 55% (2020)   NORMAL DEVICE FUNCTION --STEELE

## 2023-02-27 ENCOUNTER — TELEPHONE (OUTPATIENT)
Dept: CARDIOLOGY CLINIC | Facility: CLINIC | Age: 78
End: 2023-02-27

## 2023-02-27 NOTE — TELEPHONE ENCOUNTER
We are in receipt of cardiac clearance for colonoscopy scheduled for 4/7/23 with Dr Doretha Guadarrama  I will place in basket

## 2023-02-27 NOTE — LETTER
Cardiology Pre Operative Clearance      PRE OPERATIVE CARDIAC RISK ASSESSMENT    02/27/23    Mary Moodyryanne  1945  [id number]    Date of Surgery: 04/07/2023    Type of Surgery: Colonoscopy    Surgeon: Dr Shailesh El      {SLA AMB CARDIAC CONTRAINDICATIONS:72731}    Anticoagulation: {RESPONSE; YES/NO/NA:19980}    Physician Comment: ***    Electronically Signed: ***

## 2023-03-15 ENCOUNTER — TELEPHONE (OUTPATIENT)
Dept: CARDIOLOGY CLINIC | Facility: CLINIC | Age: 78
End: 2023-03-15

## 2023-03-15 ENCOUNTER — OFFICE VISIT (OUTPATIENT)
Dept: CARDIOLOGY CLINIC | Facility: CLINIC | Age: 78
End: 2023-03-15

## 2023-03-15 VITALS
SYSTOLIC BLOOD PRESSURE: 118 MMHG | HEIGHT: 68 IN | WEIGHT: 206 LBS | DIASTOLIC BLOOD PRESSURE: 68 MMHG | BODY MASS INDEX: 31.22 KG/M2 | OXYGEN SATURATION: 100 % | HEART RATE: 79 BPM

## 2023-03-15 DIAGNOSIS — I49.1 PREMATURE ATRIAL CONTRACTIONS: ICD-10-CM

## 2023-03-15 DIAGNOSIS — R73.01 IMPAIRED FASTING GLUCOSE: ICD-10-CM

## 2023-03-15 DIAGNOSIS — I11.9 HYPERTENSIVE HEART DISEASE WITHOUT HEART FAILURE: ICD-10-CM

## 2023-03-15 DIAGNOSIS — I49.5 SICK SINUS SYNDROME (HCC): ICD-10-CM

## 2023-03-15 DIAGNOSIS — I48.92 PAROXYSMAL ATRIAL FLUTTER (HCC): Primary | ICD-10-CM

## 2023-03-15 DIAGNOSIS — E78.5 DYSLIPIDEMIA: ICD-10-CM

## 2023-03-15 DIAGNOSIS — I10 ESSENTIAL HYPERTENSION: ICD-10-CM

## 2023-03-15 DIAGNOSIS — I49.3 ASYMPTOMATIC PVCS: ICD-10-CM

## 2023-03-15 DIAGNOSIS — I72.3 ANEURYSM OF RIGHT INTERNAL ILIAC ARTERY (HCC): ICD-10-CM

## 2023-03-15 DIAGNOSIS — E66.9 CLASS 1 OBESITY: ICD-10-CM

## 2023-03-15 DIAGNOSIS — Z95.0 PRESENCE OF PERMANENT CARDIAC PACEMAKER: ICD-10-CM

## 2023-03-15 NOTE — PATIENT INSTRUCTIONS
Continue present medication  There is no cardiac contraindication to proposed colonoscopy on 4/14/2023  Hold Eliquis for 2 days prior to colonoscopy and resume on evening of colonoscopy, as long as okay with gastroenterologist   Cardiology follow-up approximately 6 months with EKG

## 2023-03-15 NOTE — PROGRESS NOTES
Office Cardiology Progress Note  Matthew Gold [de-identified] y o  male MRN: [id number]  03/15/23  2:40 PM      ASSESSMENT:    1  Suppressed benign positional vertigo, recurrent, and present for approximately 2+ years, but last severe episode was on 01/01/2021 with none since   Persistent nocturnal lightheadedness with nocturnal ataxia and recently mildly abnormal MRI of brain on 03/08/2022   2  Controlled essential hypertension with average home reading of 108/70 five months ago   Controlled in office today  3  Status post [employer] [place] XT DR MRI permanent pacemaker implant 08/18/2020 with normal device function as of 2/16/2023  with history of  symptomatic sick sinus syndrome with occasional brief lightheadedness, syncope in August, 2020, known  frequent atypica atrial  flutter with 3:1 AV block and previously showing   intermittent marked sinus bradycardia   Prior history of asymptomatic tachycardia-bradycardia phenomenon   Seen by Dr Desiree Vazquez and recommended to stay on Eliquis  Now with 100% atrial paced rhythm  Asymptomatic PVC's and PACs noted  4  Chronic PACs with 4 8% frequency on 02/06/2018 Holter monitor and known atrial tachycardia runs of up to 46 beats with fastest rate during  bpm   20% atrial fibrillation/flutter burden on Zio patch 03/09/2020 with five runs of wide complex tachycardia, moderate PACs and rare PVCs with no symptoms   New mild first-degree AV block and atrial pacing as of 01/01/2021  Longest recent run of SVT was 53 seconds as of 09/07/2021   5  Hypertensive heart disease without heart failure     6  Chronically impaired fasting glucose with most recent fasting blood glucose 100 on 2/14/2023 with A1c of 5 8% on 2/14/2023   7  History of prior hyperkalemia and azotemia on ACE inhibition, currently suppressed     8  History of right renal mass cryoablation 5/17/12 with previous follow-up at [facility], but patient now discharged from follow-up     9    Mixed dyslipidemia with elevated triglycerides and low HDL as of 3/14/2023  10  Controlled recurrent diverticulitis     11  Stable BPH and urinary frequency/nocturia     12  Chronic obesity, class I, with a 9 pound weight gain  in approximately  2 5+ years  11  Normal nuclear stress study on 10/4/16 with 70% LVEF    12  Status post  laparoscopic cholecystectomy at [facility] for acute cholecystitis on 02/03/2020 and subsequent IR drainage of collection of blood from cholecystectomy bed on 02/29/2020  13  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  Plan       Patient Instructions     1  Continue present medication  2  There is no cardiac contraindication to proposed colonoscopy on 4/14/2023  3  Hold Eliquis for 2 days prior to colonoscopy and resume on evening of colonoscopy, as long as okay with gastroenterologist   4  Cardiology follow-up approximately 6 months with EKG  HPI    This [de-identified] y o  male  denies new cardiopulmonary and medical symptoms  This patient is currently scheduled for colonoscopy through [facility] on 4/14/2023 with cardiac clearance sought  The patient continues to spend some weekends at his condomini in [place], [place] with walking on the [facility], eating out at various restaurants and going to certain types of social events occupy his time  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    • Paroxysmal atrial flutter (HCC) Yes   • Presence of permanent cardiac pacemaker    • Premature atrial contractions    • Sick sinus syndrome (HCC)    • Asymptomatic PVCs    • Essential hypertension    • Dyslipidemia    • Hypertensive heart disease without heart failure    • Impaired fasting glucose    • Class 1 obesity    • Aneurysm of right internal iliac artery (HCC)         Review of Systems    All other systems negative, except as noted in history of present illness    Historical Information   Past Medical History:   Diagnosis Date   • Atrial tachycardia, paroxysmal ([address] ) 01/09/2018    from allscripts chart   • Bradycardia 01/09/2018    from allscripts chart   • Dyslipidemia    • Essential hypertension    • Impaired fasting glucose    • Premature atrial contractions    • Sinus bradycardia-tachycardia syndrome [facility])      Past Surgical History:   Procedure Laterality Date   • CHOLECYSTECTOMY LAPAROSCOPIC N/A 02/03/2020    Procedure: CHOLECYSTECTOMY LAPAROSCOPIC;  Surgeon: Phillip Telles DO;  [facility];  Service: General   • COLONOSCOPY  02/2010    SABINE Hoskins MD - Normal except for mild diverticulosis  • COLONOSCOPY  11/2004    SABINE Hoskins MD - Diverticulosis wiht spasms  • COLONOSCOPY W/ BIOPSIES  11/2017    BARB Hoskins MD -One 3mm polyp in the mid ascending colon, diverticulosis in the sigmoid colon and in the distal descending colon, otherwise normal examination  Bx: Colonic mucosa with features consistent iwth sessile serrated adenoma  there is no evidence of high grade dysplasia present  • COLONOSCOPY W/ BIOPSIES  08/2012    SABINE Hillman MD - Diverticulosis  Bx: normal   • COLONOSCOPY W/ BIOPSIES  09/2000    SABINE Hoskins MD - Polyp at 18cm  Bx: Benign hyperplastic polyp     • CT GUIDED FINE NEEDLE ASPIRATION (ALL INC)  02/28/2020     Social History     Substance and Sexual Activity   Alcohol Use Yes   • Alcohol/week: 14 0 standard drinks   • Types: 14 Glasses of wine per week     Social History     Substance and Sexual Activity   Drug Use No     Social History     Tobacco Use   Smoking Status Never   Smokeless Tobacco Never       Family History:  Family History   Problem Relation Age of Onset   • Microcephaly Father    • Heart attack Father          Meds/Allergies     Prior to Admission medications Medication Sig Start Date End Date Taking?  Authorizing Provider   Acai 500 MG CAPS Take by mouth   Yes Historical Provider, MD   amLODIPine (NORVASC) 5 mg tablet Take 5 mg by mouth daily 2/23/22 3/15/23 Yes Historical Provider, MD   atorvastatin (LIPITOR) 10 mg tablet Take 1 tablet (10 mg total) by mouth daily 12/18/19  Yes Nolberto Jaeger MD   Eliquis 5 MG TAKE 1 TABLET TWICE A DAY 1/24/23  Yes Nolberto Jaeger MD   glucosamine-chondroitin 500-400 MG tablet Take 1 tablet by mouth 3 (three) times a day   Yes Historical Provider, MD   lisinopril (ZESTRIL) 20 mg tablet Take 1 tablet by mouth daily   Yes Historical Provider, MD   meloxicam (MOBIC) 15 mg tablet Take 15 mg by mouth daily 11/22/22  Yes Historical Provider, MD   metoprolol succinate (TOPROL-XL) 25 mg 24 hr tablet TAKE 1 TABLET DAILY 8/12/22  Yes Nolberto Jaeger MD   Misc Natural Products (TART CHERRY ADVANCED PO) Take by mouth   Yes Historical Provider, MD   Multiple Vitamin (DAILY VALUE MULTIVITAMIN) TABS Take 1 tablet by mouth daily   Yes Historical Provider, MD   Probiotic Product (ACIDOPHILUS/BIFIDUS PROBIOTIC PO) Take by mouth   Yes Historical Provider, MD   tamsulosin (FLOMAX) 0 4 mg Take 0 4 mg by mouth   Yes Historical Provider, MD   Na Sulfate-K Sulfate-Mg Sulf (Suprep Bowel Prep Kit) 17 5-3 13-1 6 GM/177ML SOLN Take 2 Bottles by mouth once for 1 dose 2/15/23 2/15/23  Josefina Haney IV, MD   ondansetron (ZOFRAN-ODT) 4 mg disintegrating tablet Take 1 tablet (4 mg total) by mouth every 8 (eight) hours as needed for nausea or vomiting for up to 7 days 1/1/21 2/15/23  Cristi Flores MD   Probiotic Product (PROBIOTIC-10) CAPS Take by mouth  Patient not taking: Reported on 2/15/2023    Historical Provider, MD       No Known Allergies      Vitals:    03/15/23 0910   BP: 118/68   BP Location: Right arm   Patient Position: Sitting   Cuff Size: Standard   Pulse: 79   SpO2: 100%   Weight: 93 4 kg (206 lb)   Height: 5' 8" (1 727 m)       Body mass index is 31 32 kg/m²  1 pound weight gain in approximately 6+ months and 9 pound weight gain in approximately 2 5+ years    Physical Exam:    General Appearance:  Alert, cooperative, no distress, appears stated age mildly obese  Head:  Normocephalic, without obvious abnormality, atraumatic   Eyes:  PERRL, conjunctiva/corneas clear, EOM's intact,   both eyes   Ears:  Normal TM's and external ear canals, both ears   Nose: Nares normal, septum midline, mucosa normal, no drainage or sinus tenderness   Throat: Lips, mucosa, and tongue normal; teeth and gums normal   Neck: Supple, symmetrical, trachea midline, no adenopathy, thyroid: not enlarged, symmetric, no tenderness/mass/nodules, no carotid bruit or JVD   Back:   Symmetric, no curvature, ROM normal, no CVA tenderness   Lungs:   Clear to auscultation bilaterally, respirations unlabored   Chest Wall:  No tenderness or deformity with  pacemaker generator position normally in left upper pectoral region  Heart:  Frequent extrasystoles with otherwise regular cardiac rhythm, S1, S2 normal, no murmur, rub or gallop   Abdomen:   Soft, non-tender, bowel sounds active all four quadrants,  no masses, no organomegaly with moderate abdominal obesity noted     Extremities: Extremities normal, atraumatic, no cyanosis or edema   Pulses: 2+ and symmetric   Skin: Skin showed normal color, texture, turgor and no rashes or lesions   Lymph nodes: Cervical, supraclavicular, and axillary nodes normal   Neurologic: Normal         Cardiographics    ECG 03/15/23:    Sinus rhythm and atrial paced rhythm at 73-76 bpm  Frequent PACs and PVCs  LAFB  Abnormal ECG  New PACs and PVCs with less prominent atrial pacing artifact compared to 9/9/2022    [employer]tr[employer] remote pacemaker interrogation 2/16/2023:    Adequate battery voltage of 11 6 years  81% atrial pacing  Normal lead parameters  5 episodes of SVT  12 beat run of nonsustained ventricular tachycardia  Normal device function    IMAGING:    No Chest XR results available for this patient            LAB REVIEW:      Lab Results   Component Value Date    SODIUM 134 (L) 09/09/2021    K 4 5 09/09/2021     09/09/2021    CO2 25 09/09/2021    BUN 22 09/09/2021    CREATININE 1 03 09/09/2021    GLUF 106 (H) 09/09/2021    CALCIUM 9 1 09/09/2021    AST 20 09/09/2021    ALT 43 09/09/2021    ALKPHOS 60 09/09/2021    EGFR 69 09/09/2021     Lab Results   Component Value Date    CHOLESTEROL 158 09/09/2021    CHOLESTEROL 159 08/29/2019    CHOLESTEROL 149 09/17/2018     Lab Results   Component Value Date    HDL 39 (L) 09/09/2021    HDL 47 08/29/2019    HDL 49 09/17/2018       Lab Results   Component Value Date    LDLCALC 84 09/09/2021    LDLCALC 93 08/29/2019    LDLCALC 78 09/17/2018     No components found for: [facility]  Lab Results   Component Value Date    TRIG 173 (H) 09/09/2021    TRIG 94 08/29/2019    TRIG 112 09/17/2018     CBC 11/14/2022: Normal except platelets 328N, slightly low  A1c and estimated average glucose 3/14/2023: 5 8% and 120  Lipid panel 3/14/2023: Cholesterol 165,  HDL 47 LDL 84  CMP 3/14/2023: Normal except for glucose 100 and total bilirubin 1 2          Floridalma Coyne MD

## 2023-04-13 RX ORDER — SODIUM CHLORIDE 9 MG/ML
125 INJECTION, SOLUTION INTRAVENOUS CONTINUOUS
Status: CANCELLED | OUTPATIENT
Start: 2023-04-13

## 2023-06-12 DIAGNOSIS — I48.92 PAROXYSMAL ATRIAL FLUTTER (HCC): ICD-10-CM

## 2023-08-07 DIAGNOSIS — I48.92 PAROXYSMAL ATRIAL FLUTTER (HCC): ICD-10-CM

## 2023-08-07 RX ORDER — METOPROLOL SUCCINATE 25 MG/1
TABLET, EXTENDED RELEASE ORAL
Qty: 90 TABLET | Refills: 3 | Status: SHIPPED | OUTPATIENT
Start: 2023-08-07

## 2023-09-05 DIAGNOSIS — I48.92 PAROXYSMAL ATRIAL FLUTTER (HCC): ICD-10-CM

## 2023-09-11 ENCOUNTER — TELEPHONE (OUTPATIENT)
Dept: CARDIOLOGY CLINIC | Facility: CLINIC | Age: 78
End: 2023-09-11

## 2023-11-08 ENCOUNTER — OFFICE VISIT (OUTPATIENT)
Dept: CARDIOLOGY CLINIC | Facility: CLINIC | Age: 78
End: 2023-11-08
Payer: MEDICARE

## 2023-11-08 VITALS
HEIGHT: 68 IN | OXYGEN SATURATION: 99 % | HEART RATE: 64 BPM | BODY MASS INDEX: 31.67 KG/M2 | WEIGHT: 209 LBS | DIASTOLIC BLOOD PRESSURE: 80 MMHG | SYSTOLIC BLOOD PRESSURE: 117 MMHG

## 2023-11-08 DIAGNOSIS — I49.5 TACHY-BRADY SYNDROME (HCC): ICD-10-CM

## 2023-11-08 DIAGNOSIS — I11.9 HYPERTENSIVE HEART DISEASE WITHOUT HEART FAILURE: ICD-10-CM

## 2023-11-08 DIAGNOSIS — E78.2 MIXED DYSLIPIDEMIA: ICD-10-CM

## 2023-11-08 DIAGNOSIS — R73.03 PREDIABETES: ICD-10-CM

## 2023-11-08 DIAGNOSIS — Z79.01 ON CONTINUOUS ORAL ANTICOAGULATION: ICD-10-CM

## 2023-11-08 DIAGNOSIS — I72.3 ANEURYSM OF RIGHT INTERNAL ILIAC ARTERY (HCC): ICD-10-CM

## 2023-11-08 DIAGNOSIS — I49.1 PREMATURE ATRIAL CONTRACTIONS: ICD-10-CM

## 2023-11-08 DIAGNOSIS — I48.92 PAROXYSMAL ATRIAL FLUTTER (HCC): Primary | ICD-10-CM

## 2023-11-08 DIAGNOSIS — E66.9 CLASS 1 OBESITY: ICD-10-CM

## 2023-11-08 DIAGNOSIS — I10 ESSENTIAL HYPERTENSION: Chronic | ICD-10-CM

## 2023-11-08 DIAGNOSIS — Z95.0 PRESENCE OF PERMANENT CARDIAC PACEMAKER: ICD-10-CM

## 2023-11-08 PROCEDURE — 99214 OFFICE O/P EST MOD 30 MIN: CPT | Performed by: INTERNAL MEDICINE

## 2023-11-08 PROCEDURE — 93000 ELECTROCARDIOGRAM COMPLETE: CPT | Performed by: INTERNAL MEDICINE

## 2023-11-08 RX ORDER — COLCHICINE 0.6 MG/1
TABLET ORAL
COMMUNITY
Start: 2023-07-28

## 2023-11-08 NOTE — PATIENT INSTRUCTIONS
Try to reduce carbohydrate intake intake of meals you are eating as there has been a progressive weight gain of 13 pounds over about 3 years. ContinueContinue present medication. Cardiology follow up in 6 months with EKG.

## 2023-11-08 NOTE — PROGRESS NOTES
Office Cardiology Progress Note  Yohana Pagan 78 y.o. male MRN: [id number]  11/08/23  12:40 PM      ASSESSMENT:    1. Suppressed benign positional vertigo, recurrent, and present for approximately 2+ years, but last severe episode was on 01/01/2021, almost 3 years ago, with none since. Persistent nocturnal lightheadedness with nocturnal ataxia and  mildly abnormal MRI of brain on 03/08/2022.  2. Controlled essential hypertension with average home reading of 108/70 1.1 years ago. Controlled in office today. 3. Status post Medtronic Allyson XT DR MRI permanent pacemaker implant 08/18/2020 with normal device function as of 2/16/2023 and no follow-up interrogation since then with history of  symptomatic sick sinus syndrome with occasional brief lightheadedness, syncope in August, 2020, known  frequent atypical atrial  flutter with 3:1 AV block and previously showing   intermittent marked sinus bradycardia. Prior history of asymptomatic tachycardia-bradycardia phenomenon. Seen by Dr. Weller Postal 4.5 years ago and recommended to stay on Eliquis. Now with 100% atrial paced rhythm. Asymptomatic PVC's and PACs noted previously. 4. Chronic PACs with 4.8% frequency on 02/06/2018 Holter monitor and known atrial tachycardia runs of up to 46 beats with fastest rate during  bpm.  20% atrial fibrillation/flutter burden on Zio patch 03/09/2020 with five runs of wide complex tachycardia, moderate PACs and rare PVCs with no symptoms. New mild first-degree AV block and atrial pacing as of 01/01/2021. Longest recent run of SVT was 53 seconds as of 09/07/2021.  5. Hypertensive heart disease without heart failure. 6. Chronically impaired fasting glucose and prediabetes mellitus with most recent fasting blood glucose 103 on 07/17/2023 with A1c of 5.8% on 2/14/2023.  7. History of prior hyperkalemia and azotemia on ACE inhibition, currently suppressed.     8. History of right renal mass cryoablation 5/17/12 with previous follow-up at [facility], but patient now discharged from follow-up. 9.  Mixed dyslipidemia with elevated triglycerides and low HDL as of 3/14/2023, but resolved as of 7/17/2023. 10. Controlled recurrent diverticulitis. 11. Stable BPH and urinary frequency/nocturia. 12. Chronic obesity, class I, with a 13 pound weight gain  in approximately  3+ years. 11. Normal nuclear stress study on 10/4/16 with 70% LVEF. 12. Status post  laparoscopic cholecystectomy at [facility] for acute cholecystitis on 02/03/2020 and subsequent IR drainage of collection of blood from cholecystectomy bed on 02/29/2020. 13. 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 Plan       Patient Instructions     Try to reduce carbohydrate intake intake of meals you are eating as there has been a progressive weight gain of 13 pounds over about 3 years. Continue present medication. Cardiology follow up in 6 months with EKG. We will attempt to get patient engaged with pacemaker clinic follow-up, as there has been none for approximately 9 months. HPI    This 78 y.o. male  denies new cardiopulmonary and medical symptoms. He complains of generalized tiredness. The patient just returned from [place], [place] where he spent 4 days with a good friend. He still goes to his condominium in [place], [place] every other weekend. 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    He admits to having most of his meals outside of his home. He does no cooking for himself. The patient is also being seen in follow-up of the below listed diagnoses. Encounter Diagnoses   Name Primary?     Paroxysmal atrial flutter (HCC) Yes    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HCC)     Presence of permanent cardiac pacemaker     Essential hypertension     Hypertensive heart disease without heart failure     Premature atrial contractions     On continuous oral anticoagulation Prediabetes     Mixed dyslipidemia     Class 1 obesity     Aneurysm of right internal iliac artery (HCC)         Review of Systems    All other systems negative, except as noted in history of present illness    Historical Information   Past Medical History:   Diagnosis Date    Atrial tachycardia, paroxysmal 01/09/2018    from allscripts chart    Bradycardia 01/09/2018    from allscripts chart    Dyslipidemia     Essential hypertension     Impaired fasting glucose     Premature atrial contractions     Sinus bradycardia-tachycardia syndrome [facility])      Past Surgical History:   Procedure Laterality Date    CHOLECYSTECTOMY LAPAROSCOPIC N/A 02/03/2020    Procedure: CHOLECYSTECTOMY LAPAROSCOPIC;  Surgeon: Sunita Singh DO;  [facility];  Service: General    COLONOSCOPY  02/2010    SABINE Hoskins MD.- Normal except for mild diverticulosis. COLONOSCOPY  11/2004    SABINE Hoskins MD.- Diverticulosis wiht spasms. COLONOSCOPY W/ BIOPSIES  11/2017    APOLLO Hoskins MD.-One 3mm polyp in the mid ascending colon, diverticulosis in the sigmoid colon and in the distal descending colon, otherwise normal examination. Bx: Colonic mucosa with features consistent iwth sessile serrated adenoma. there is no evidence of high grade dysplasia present. COLONOSCOPY W/ BIOPSIES  08/2012    SABINE Eddy MD.- Diverticulosis. Bx: normal    COLONOSCOPY W/ BIOPSIES  09/2000    SABINE Hoskins MD.- Polyp at 18cm. Bx: Benign hyperplastic polyp.     CT GUIDED FINE NEEDLE ASPIRATION (ALL INC)  02/28/2020     Social History     Substance and Sexual Activity   Alcohol Use Yes    Alcohol/week: 14.0 standard drinks of alcohol    Types: 14 Glasses of wine per week     Social History     Substance and Sexual Activity   Drug Use No     Social History     Tobacco Use   Smoking Status Never   Smokeless Tobacco Never       Family History:  Family History   Problem Relation Age of Onset    Microcephaly Father     Heart attack Father Meds/Allergies     Prior to Admission medications    Medication Sig Start Date End Date Taking?  Authorizing Provider   Acai 500 MG CAPS Take by mouth   Yes Historical Provider, MD   apixaban (Eliquis) 5 mg Take 1 tablet (5 mg total) by mouth 2 (two) times a day 9/7/23  Yes Samara Valverde MD   atorvastatin (LIPITOR) 10 mg tablet Take 1 tablet (10 mg total) by mouth daily 12/18/19  Yes Samara Valverde MD   colchicine (COLCRYS) 0.6 mg tablet Take 2 tablets immediately, repeat with one tablet 2 hours later 7/28/23  Yes Historical Provider, MD   glucosamine-chondroitin 500-400 MG tablet Take 1 tablet by mouth 3 (three) times a day   Yes Historical Provider, MD   lisinopril (ZESTRIL) 20 mg tablet Take 1 tablet by mouth daily   Yes Historical Provider, MD   meloxicam (MOBIC) 15 mg tablet Take 15 mg by mouth daily 11/22/22  Yes Historical Provider, MD   metoprolol succinate (TOPROL-XL) 25 mg 24 hr tablet TAKE 1 TABLET DAILY 8/7/23  Yes Samara Valverde MD   Misc Natural Products (TART CHERRY ADVANCED PO) Take by mouth   Yes Historical Provider, MD   Multiple Vitamin (DAILY VALUE MULTIVITAMIN) TABS Take 1 tablet by mouth daily   Yes Historical Provider, MD   Probiotic Product (ACIDOPHILUS/BIFIDUS PROBIOTIC PO) Take by mouth   Yes Historical Provider, MD   tamsulosin (FLOMAX) 0.4 mg Take 0.4 mg by mouth   Yes Historical Provider, MD   amLODIPine (NORVASC) 5 mg tablet Take 5 mg by mouth daily 2/23/22 4/14/23  Historical Provider, MD   Na Sulfate-K Sulfate-Mg Sulf (Suprep Bowel Prep Kit) 17.5-3.13-1.6 GM/177ML SOLN Take 2 Bottles by mouth once for 1 dose 2/15/23 2/15/23  Tarun Fairchild IV, MD   ondansetron (ZOFRAN-ODT) 4 mg disintegrating tablet Take 1 tablet (4 mg total) by mouth every 8 (eight) hours as needed for nausea or vomiting for up to 7 days 1/1/21 2/15/23  Tim Bowden MD   Probiotic Product (PROBIOTIC-10) CAPS Take by mouth    Historical Provider, MD       No Known Allergies      Vitals: 11/08/23 1010   BP: 117/80   BP Location: Right arm   Patient Position: Sitting   Cuff Size: Standard   Pulse: 64   SpO2: 99%   Weight: 94.8 kg (209 lb)   Height: 5' 8" (1.727 m)       Body mass index is 31.78 kg/m². 3 pound weight gain in approximately 8 months, 4 pound weight gain in approximately 1.2+ years and 13 pound weight gain in approximately 3+ years    Physical Exam:    General Appearance:  Alert, cooperative, no distress, appears stated age and is mildly to moderately obese. Head:  Normocephalic, without obvious abnormality, atraumatic   Eyes:  PERRL, conjunctiva/corneas clear, EOM's intact,   both eyes   Ears:  Normal TM's and external ear canals, both ears   Nose: Nares normal, septum midline, mucosa normal, no drainage or sinus tenderness   Throat: Lips, mucosa, and tongue normal; teeth and gums normal   Neck: Supple, symmetrical, trachea midline, no adenopathy, thyroid: not enlarged, symmetric, no tenderness/mass/nodules, no carotid bruit or JVD   Back:   Symmetric, no curvature, ROM normal, no CVA tenderness   Lungs:   Clear to auscultation bilaterally, respirations unlabored   Chest Wall:  No tenderness or deformity  with  pacemaker generator positioned normally in left upper pectoral region. Heart:  Regular rate and rhythm, S1, S2 normal, no murmur, rub or gallop   Abdomen:   Soft, non-tender, bowel sounds active all four quadrants,  no masses, no organomegaly. Moderate abdominal obesity noted.    Extremities: Extremities normal, atraumatic, no cyanosis or edema   Pulses: 2+ and symmetric   Skin: Skin showed normal color, texture, turgor and no rashes or lesions   Lymph nodes: Cervical, supraclavicular, and axillary nodes normal   Neurologic: Normal         Cardiographics    ECG 11/08/23:    Normal sinus rhythm at 64 bpm  LAFB  Nonspecific T abnormality diffusely  Abnormal ECG  Suppressed PACs and PVCs since 3/15/2023   Because of technical limitations, cannot rule out atrial paced rhythm. IMAGING:    No Chest XR results available for this patient. LAB REVIEW:      Lab Results   Component Value Date    SODIUM 134 (L) 09/09/2021    K 4.5 09/09/2021     09/09/2021    CO2 25 09/09/2021    BUN 22 09/09/2021    CREATININE 1.03 09/09/2021    GLUF 106 (H) 09/09/2021    CALCIUM 9.1 09/09/2021    AST 20 09/09/2021    ALT 43 09/09/2021    ALKPHOS 60 09/09/2021    EGFR 69 09/09/2021     Lab Results   Component Value Date    CHOLESTEROL 158 09/09/2021    CHOLESTEROL 159 08/29/2019    CHOLESTEROL 149 09/17/2018     Lab Results   Component Value Date    HDL 39 (L) 09/09/2021    HDL 47 08/29/2019    HDL 49 09/17/2018       Lab Results   Component Value Date    LDLCALC 84 09/09/2021    LDLCALC 93 08/29/2019    LDLCALC 78 09/17/2018     No components found for: "DIRECTLDLREFLEX"  Lab Results   Component Value Date    TRIG 173 (H) 09/09/2021    TRIG 94 08/29/2019    TRIG 112 09/17/2018       CBC, uric acid, urinalysis 7/17/2023:  All normal  Lipid panel 7/17/2023: Cholesterol 143, , HDL 48, LDL 73  CMP 7/17/2023: Normal except glucose 103, total bilirubin 1.3        Nathan Harrington MD

## 2023-11-10 ENCOUNTER — REMOTE DEVICE CLINIC VISIT (OUTPATIENT)
Dept: CARDIOLOGY CLINIC | Facility: CLINIC | Age: 78
End: 2023-11-10
Payer: MEDICARE

## 2023-11-10 DIAGNOSIS — Z95.0 CARDIAC PACEMAKER IN SITU: Primary | ICD-10-CM

## 2023-11-10 PROCEDURE — 93296 REM INTERROG EVL PM/IDS: CPT | Performed by: INTERNAL MEDICINE

## 2023-11-10 PROCEDURE — 93294 REM INTERROG EVL PM/LDLS PM: CPT | Performed by: INTERNAL MEDICINE

## 2023-11-10 NOTE — PROGRESS NOTES
Results for orders placed or performed in visit on 11/10/23   Cardiac EP device report    Narrative    JUAN Carty IS MRI CONDITIONAL  CARELINK TRANSMISSION: BATTERY STATUS "10 YRS." AP 82%  0%. ALL AVAILABLE LEAD PARAMETERS WITHIN NORMAL LIMITS. MULTIPLE VHRS & FAST A/V NOTED; SOME RATES >150 BPM. AVAIL EGRAMS PRESENT AS SVT, PACS, PVCS, & NSVT. EF 55% (ECHO 2020). PT ON ELIQUIS & METO SUCC. NORMAL DEVICE FUNCTION.  NC

## 2024-01-08 ENCOUNTER — IN-CLINIC DEVICE VISIT (OUTPATIENT)
Dept: CARDIOLOGY CLINIC | Facility: CLINIC | Age: 79
End: 2024-01-08
Payer: MEDICARE

## 2024-01-08 ENCOUNTER — TELEPHONE (OUTPATIENT)
Dept: CARDIOLOGY CLINIC | Facility: CLINIC | Age: 79
End: 2024-01-08

## 2024-01-08 DIAGNOSIS — Z95.0 CARDIAC PACEMAKER IN SITU: Primary | ICD-10-CM

## 2024-01-08 PROCEDURE — 93280 PM DEVICE PROGR EVAL DUAL: CPT | Performed by: INTERNAL MEDICINE

## 2024-01-08 NOTE — TELEPHONE ENCOUNTER
----- Message from Tavo Diehl MD sent at 1/8/2024 10:50 AM EST -----  Patient's device interrogation reading shows,  device function is normal.  Patient has normal EF.  Continue beta-blockers.  Episode of fast ventricular rate asymptomatic noted      Please call patient.

## 2024-01-08 NOTE — PROGRESS NOTES
Results for orders placed or performed in visit on 01/08/24   Cardiac EP device report    Narrative    MDT DUAL CHAMBER PM/ACTIVE SYSTEM IS MRI CONDITIONAL  DEVICE INTERROGATED IN THE [place] OFFICE: BATTERY VOLTAGE ADEQUATE-10 YRS. AP 84%  0%. ALL AVAILABLE LEAD PARAMETERS & TRENDS WITHIN NORMAL LIMITS. 9 VHRS NOTED; RATES 157-182 BPM; PT ON METO SUCC & ELIQUIS. EF 55% (ECHO 2020). AVAIL EGRAMS PRESENT AS SVT (HX OF SAME), PACS, & PVCS. #259 SHOWING NSVT [id number] BEATS@ 182 BPM. NO PROGRAMMING CHANGES MADE TO DEVICE PARAMETERS. NORMAL DEVICE FUNCTION. NC

## 2024-02-09 ENCOUNTER — TRANSCRIBE ORDERS (OUTPATIENT)
Dept: LAB | Facility: CLINIC | Age: 79
End: 2024-02-09

## 2024-02-09 ENCOUNTER — APPOINTMENT (OUTPATIENT)
Dept: LAB | Facility: CLINIC | Age: 79
End: 2024-02-09
Payer: MEDICARE

## 2024-02-09 DIAGNOSIS — R42 LIGHT-HEADEDNESS: Primary | ICD-10-CM

## 2024-02-09 LAB — B BURGDOR IGG+IGM SER QL IA: NEGATIVE

## 2024-02-09 PROCEDURE — 86618 LYME DISEASE ANTIBODY: CPT

## 2024-02-09 PROCEDURE — 36415 COLL VENOUS BLD VENIPUNCTURE: CPT

## 2024-02-12 ENCOUNTER — TELEPHONE (OUTPATIENT)
Dept: CARDIOLOGY CLINIC | Facility: CLINIC | Age: 79
End: 2024-02-12

## 2024-02-12 NOTE — TELEPHONE ENCOUNTER
----- Message from Tavo Diehl MD sent at 2/11/2024  4:04 PM EST -----  Patient's device interrogation reading shows,  device function is normal 1 episode of fast ventricular rate happened.  Continue same Rx he should be seen by some physician or NP.      Please call patient.

## 2024-02-16 ENCOUNTER — OFFICE VISIT (OUTPATIENT)
Dept: CARDIOLOGY CLINIC | Facility: CLINIC | Age: 79
End: 2024-02-16
Payer: MEDICARE

## 2024-02-16 VITALS
DIASTOLIC BLOOD PRESSURE: 90 MMHG | SYSTOLIC BLOOD PRESSURE: 120 MMHG | HEIGHT: 68 IN | WEIGHT: 208.5 LBS | HEART RATE: 78 BPM | BODY MASS INDEX: 31.6 KG/M2 | OXYGEN SATURATION: 97 %

## 2024-02-16 DIAGNOSIS — I72.3 ANEURYSM OF RIGHT INTERNAL ILIAC ARTERY (HCC): ICD-10-CM

## 2024-02-16 DIAGNOSIS — Z95.0 CARDIAC PACEMAKER IN SITU: ICD-10-CM

## 2024-02-16 DIAGNOSIS — I48.92 PAROXYSMAL ATRIAL FLUTTER (HCC): Primary | ICD-10-CM

## 2024-02-16 DIAGNOSIS — I11.9 HYPERTENSIVE HEART DISEASE WITHOUT HEART FAILURE: ICD-10-CM

## 2024-02-16 DIAGNOSIS — I49.5 TACHY-BRADY SYNDROME (HCC): ICD-10-CM

## 2024-02-16 DIAGNOSIS — I10 ESSENTIAL HYPERTENSION: ICD-10-CM

## 2024-02-16 DIAGNOSIS — Z95.0 PRESENCE OF PERMANENT CARDIAC PACEMAKER: ICD-10-CM

## 2024-02-16 DIAGNOSIS — E78.2 MIXED DYSLIPIDEMIA: ICD-10-CM

## 2024-02-16 DIAGNOSIS — I49.5 SICK SINUS SYNDROME (HCC): ICD-10-CM

## 2024-02-16 PROCEDURE — 93000 ELECTROCARDIOGRAM COMPLETE: CPT | Performed by: NURSE PRACTITIONER

## 2024-02-16 PROCEDURE — 99214 OFFICE O/P EST MOD 30 MIN: CPT | Performed by: NURSE PRACTITIONER

## 2024-02-16 RX ORDER — MECLIZINE HYDROCHLORIDE 25 MG/1
TABLET ORAL
COMMUNITY
Start: 2024-02-07

## 2024-02-16 NOTE — PROGRESS NOTES
Progress Note - Cardiology Office  [facility]    Adalid Mancilla 79 y.o. male MRN: [id number]  : 1945  Encounter: [id number]      Assessment:     1. Paroxysmal atrial flutter (HCC)    2. Cardiac pacemaker in situ    3. 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HCC)    4. Essential hypertension    5. Hypertensive heart disease without heart failure    6. Mixed dyslipidemia    7. Sick sinus syndrome (HCC)    8. Presence of permanent cardiac pacemaker    9. Aneurysm of right internal iliac artery (HCC)        Discussion Summary and Plan:  1. NSVT seen on pacemaker interrogation: patient asymptomatic    -   continue to monitor pacemaker interrogations    -   continue Toprol-XL 25 mg once a day    -   continue Eliquis 5 mg BID    -   will recheck echocardiogram to reevaluate cardiac function, structure and wall motion    2. Hypertension: blood pressure stable and acceptable    -   patient on Flomax 0.4 mg once a day for BPH    -   continue Toprol-XL 25 mg once a day    -   continue lisinopril 20 mg once a day    3. Paroxysmal atrial fibrillation/sick sinus syndrome: status post Medtronic pacemaker with 10.5 years on battery voltage. Patient is 88% atrial paced and 0.2% ventricular paced    -   continue Toprol-XL 25 mg once a day    -   the Eliquis 5 mg BID    4. Dyslipidemia: 11/10/2023 lipid panel: total cholesterol 152, triglycerides 109, HDL 50 and LDL 80    -   continue atorvastatin 10 mg daily        Patient / Caretaker was advised and educated to call our office  immediately if  patient has any new symptoms of chest pain/shortness of breath, near-syncope, syncope, light headedness sustained palpitations  or any other cardiovascular symptoms before their scheduled follow-up appointment.  Office number was provided #[phone].    Please call [phone] if any questions.    Counseling :  A description of the counseling.  Goals and Barriers.  Patient's ability to self care: Yes  Medication side effect  reviewed with patient in detail and all their questions answered to their satisfaction.    HPI :     Adalid Mancilla is a 79 y.o. year old male who came for follow up. He recently had his device interrogated on 2/7/2024 which noted three episodes of nonsustainable ventricular tachycardia which 3 egrams noting SVT one at 26 beats at a rate of 182 bpm, 13 beats at hundred and 62 bpm and 14 beats at 160 bpm. Patient denies feeling any palpitations, lightheadedness or dizziness. He notes he is compliant with his medication especially eliquis and metoprolol.    Patient has a history significant for BPPV, hypertension, sick sinus syndrome for which she has a Medtronic pacemaker, paroxysmal atrial fibrillation with chronic PACs, hypertension, history of right renal mass for which she had cryoablation in 2012 at [facility], dyslipidemia, and BPH. All cardiac testing is from 2020.    Review of Systems   Constitutional: Negative.  Negative for activity change and fatigue.   HENT: Negative.  Negative for congestion, facial swelling, sinus pressure and trouble swallowing.    Eyes: Negative.  Negative for photophobia and visual disturbance.   Respiratory: Negative.  Negative for chest tightness, shortness of breath and wheezing.    Cardiovascular: Negative.  Negative for chest pain, palpitations and leg swelling.   Gastrointestinal: Negative.  Negative for abdominal distention, constipation, diarrhea and vomiting.   Endocrine: Negative.  Negative for polydipsia, polyphagia and polyuria.   Genitourinary: Negative.  Negative for difficulty urinating.   Musculoskeletal: Negative.    Skin: Negative.    Neurological: Negative.  Negative for dizziness, syncope, weakness and light-headedness.   Hematological: Negative.    Psychiatric/Behavioral: Negative.  Negative for agitation, sleep disturbance and suicidal ideas. The patient is not hyperactive.        Historical Information   Past Medical History:   Diagnosis Date    Atrial  tachycardia, paroxysmal 01/09/2018    from allscripts chart    Bradycardia 01/09/2018    from allscripts chart    Dyslipidemia     Essential hypertension     Impaired fasting glucose     Premature atrial contractions     Sinus bradycardia-tachycardia syndrome (HCC)      Past Surgical History:   Procedure Laterality Date    CHOLECYSTECTOMY LAPAROSCOPIC N/A 02/03/2020    Procedure: CHOLECYSTECTOMY LAPAROSCOPIC;  Surgeon: Hugo Hatch DO;  [facility];  Service: General    COLONOSCOPY  02/2010    SABINE Hoskins MD.- Normal except for mild diverticulosis.    COLONOSCOPY  11/2004    SABINE Hoskins MD.- Diverticulosis wiht spasms.    COLONOSCOPY W/ BIOPSIES  11/2017    APOLLO Hoskins MD.-One 3mm polyp in the mid ascending colon, diverticulosis in the sigmoid colon and in the distal descending colon, otherwise normal examination. Bx: Colonic mucosa with features consistent iwth sessile serrated adenoma. there is no evidence of high grade dysplasia present.    COLONOSCOPY W/ BIOPSIES  08/2012    SABINE Hoskins MD.- Diverticulosis. Bx: normal    COLONOSCOPY W/ BIOPSIES  09/2000    SABINE Hoskins MD.- Polyp at 18cm. Bx: Benign hyperplastic polyp.    CT GUIDED FINE NEEDLE ASPIRATION (ALL INC)  02/28/2020     Social History     Substance and Sexual Activity   Alcohol Use Yes    Alcohol/week: 14.0 standard drinks of alcohol    Types: 14 Glasses of wine per week     Social History     Substance and Sexual Activity   Drug Use No     Social History     Tobacco Use   Smoking Status Never   Smokeless Tobacco Never     Family History:   Family History   Problem Relation Age of Onset    Microcephaly Father     Heart attack Father        Meds/Allergies     No Known Allergies    Current Outpatient Medications:     Acai 500 MG CAPS, Take by mouth, Disp: , Rfl:     apixaban (Eliquis) 5 mg, Take 1 tablet (5 mg total) by mouth 2 (two) times a day, Disp: 180 tablet, Rfl: 3    atorvastatin (LIPITOR) 10 mg tablet, Take 1  "tablet (10 mg total) by mouth daily, Disp: 90 tablet, Rfl: 3    colchicine (COLCRYS) 0.6 mg tablet, Take 2 tablets immediately, repeat with one tablet 2 hours later, Disp: , Rfl:     glucosamine-chondroitin 500-400 MG tablet, Take 1 tablet by mouth 3 (three) times a day, Disp: , Rfl:     lisinopril (ZESTRIL) 20 mg tablet, Take 1 tablet by mouth daily, Disp: , Rfl:     meclizine (ANTIVERT) 25 mg tablet, , Disp: , Rfl:     meloxicam (MOBIC) 15 mg tablet, Take 15 mg by mouth daily, Disp: , Rfl:     metoprolol succinate (TOPROL-XL) 25 mg 24 hr tablet, TAKE 1 TABLET DAILY, Disp: 90 tablet, Rfl: 3    Misc Natural Products (TART CHERRY ADVANCED PO), Take by mouth, Disp: , Rfl:     Multiple Vitamin (DAILY VALUE MULTIVITAMIN) TABS, Take 1 tablet by mouth daily, Disp: , Rfl:     Probiotic Product (ACIDOPHILUS/BIFIDUS PROBIOTIC PO), Take by mouth, Disp: , Rfl:     tamsulosin (FLOMAX) 0.4 mg, Take 0.4 mg by mouth, Disp: , Rfl:     amLODIPine (NORVASC) 5 mg tablet, Take 5 mg by mouth daily, Disp: , Rfl:     Na Sulfate-K Sulfate-Mg Sulf (Suprep Bowel Prep Kit) 17.5-3.13-1.6 GM/177ML SOLN, Take 2 Bottles by mouth once for 1 dose, Disp: 354 mL, Rfl: 0    ondansetron (ZOFRAN-ODT) 4 mg disintegrating tablet, Take 1 tablet (4 mg total) by mouth every 8 (eight) hours as needed for nausea or vomiting for up to 7 days, Disp: 20 tablet, Rfl: 0    Vitals: Blood pressure 120/90, pulse 78, height 5' 8\" (1.727 m), weight 94.6 kg (208 lb 8 oz), SpO2 97%.    Body mass index is 31.7 kg/m².  Wt Readings from Last 3 Encounters:   02/16/24 94.6 kg (208 lb 8 oz)   11/08/23 94.8 kg (209 lb)   04/14/23 91.6 kg (202 lb)     Vitals:    02/16/24 1256   Weight: 94.6 kg (208 lb 8 oz)     BP Readings from Last 3 Encounters:   02/16/24 120/90   11/08/23 117/80   04/14/23 153/81       Physical Exam:  Physical Exam  Vitals and nursing note reviewed.   Constitutional:       General: He is not in acute distress.     Appearance: Normal appearance. He is obese. " "  HENT:      Right Ear: External ear normal.      Left Ear: External ear normal.      Nose: Nose normal.   Eyes:      General: No scleral icterus.        Right eye: No discharge.         Left eye: No discharge.   Cardiovascular:      Rate and Rhythm: Normal rate and regular rhythm.      Pulses: Normal pulses.      Heart sounds: Normal heart sounds.   Pulmonary:      Effort: Pulmonary effort is normal. No respiratory distress.      Breath sounds: Normal breath sounds.   Abdominal:      General: Bowel sounds are normal. There is no distension.      Palpations: Abdomen is soft.   Musculoskeletal:      Right lower leg: No edema.      Left lower leg: No edema.   Skin:     General: Skin is warm and dry.      Capillary Refill: Capillary refill takes less than 2 seconds.   Neurological:      General: No focal deficit present.      Mental Status: He is alert. Mental status is at baseline.      Deep Tendon Reflexes: Reflexes normal.   Psychiatric:         Mood and Affect: Mood normal.           Diagnostic Studies Review Cardio:      EKG:  sinus rhythm with ventricular pacing        Yu DON  Cardiology        \"This note was completed in part utilizing [employer] direct voice recognition software.   Grammatical errors, random word insertion, spelling mistakes, and incomplete sentences may be an occasional consequence of the system secondary to software limitations, ambient noise and hardware issues.    Please read the chart carefully and recognize, using context, where substitutions have occurred.  If you have any questions or concerns about the context, text or information contained within the body of this dictation, please contact myself, the provider, for further clarification.\"  "

## 2024-02-27 ENCOUNTER — HOSPITAL ENCOUNTER (OUTPATIENT)
Dept: NON INVASIVE DIAGNOSTICS | Facility: HOSPITAL | Age: 79
Discharge: HOME | End: 2024-02-27
Payer: MEDICARE

## 2024-02-27 VITALS
BODY MASS INDEX: 31.61 KG/M2 | HEART RATE: 94 BPM | WEIGHT: 208.56 LBS | SYSTOLIC BLOOD PRESSURE: 148 MMHG | HEIGHT: 68 IN | DIASTOLIC BLOOD PRESSURE: 83 MMHG

## 2024-02-27 DIAGNOSIS — I49.5 TACHY-BRADY SYNDROME (HCC): ICD-10-CM

## 2024-02-27 DIAGNOSIS — Z95.0 CARDIAC PACEMAKER IN SITU: ICD-10-CM

## 2024-02-27 DIAGNOSIS — E78.2 MIXED DYSLIPIDEMIA: ICD-10-CM

## 2024-02-27 DIAGNOSIS — I48.92 PAROXYSMAL ATRIAL FLUTTER (HCC): ICD-10-CM

## 2024-02-27 PROCEDURE — 93306 TTE W/DOPPLER COMPLETE: CPT

## 2024-02-27 PROCEDURE — 93306 TTE W/DOPPLER COMPLETE: CPT | Performed by: INTERNAL MEDICINE

## 2024-02-28 LAB
AORTIC ROOT: 3.4 CM
APICAL FOUR CHAMBER EJECTION FRACTION: 59 %
BSA FOR ECHO PROCEDURE: 2.08 M2
E WAVE DECELERATION TIME: 307 MS
E/A RATIO: 0.54
FRACTIONAL SHORTENING: 30 (ref 28–44)
INTERVENTRICULAR SEPTUM IN DIASTOLE (PARASTERNAL SHORT AXIS VIEW): 1.4 CM
INTERVENTRICULAR SEPTUM: 1.4 CM (ref 0.6–1.1)
LAAS-AP2: 23.3 CM2
LAAS-AP4: 22 CM2
LEFT ATRIUM SIZE: 4.4 CM
LEFT ATRIUM VOLUME (MOD BIPLANE): 72 ML
LEFT ATRIUM VOLUME INDEX (MOD BIPLANE): 34.6 ML/M2
LEFT INTERNAL DIMENSION IN SYSTOLE: 2.8 CM (ref 2.1–4)
LEFT VENTRICULAR INTERNAL DIMENSION IN DIASTOLE: 4 CM (ref 3.5–6)
LEFT VENTRICULAR POSTERIOR WALL IN END DIASTOLE: 1.2 CM
LEFT VENTRICULAR STROKE VOLUME: 40 ML
LVSV (TEICH): 40 ML
MV E'TISSUE VEL-LAT: 8 CM/S
MV E'TISSUE VEL-SEP: 6 CM/S
MV PEAK A VEL: 0.79 M/S
MV PEAK E VEL: 43 CM/S
MV STENOSIS PRESSURE HALF TIME: 89 MS
MV VALVE AREA P 1/2 METHOD: 2.47
RA PRESSURE ESTIMATED: 8 MMHG
RIGHT ATRIUM AREA SYSTOLE A4C: 12.7 CM2
RIGHT VENTRICLE ID DIMENSION: 3.3 CM
RV PSP: 43 MMHG
SL CV LEFT ATRIUM LENGTH A2C: 5.6 CM
SL CV LV EF: 55
SL CV PED ECHO LEFT VENTRICLE DIASTOLIC VOLUME (MOD BIPLANE) 2D: 70 ML
SL CV PED ECHO LEFT VENTRICLE SYSTOLIC VOLUME (MOD BIPLANE) 2D: 30 ML
TR MAX PG: 35 MMHG
TR PEAK VELOCITY: 3 M/S
TRICUSPID ANNULAR PLANE SYSTOLIC EXCURSION: 2.5 CM
TRICUSPID VALVE PEAK REGURGITATION VELOCITY: 2.97 M/S

## 2024-03-05 ENCOUNTER — TELEPHONE (OUTPATIENT)
Dept: CARDIOLOGY CLINIC | Facility: CLINIC | Age: 79
End: 2024-03-05

## 2024-03-05 NOTE — TELEPHONE ENCOUNTER
----- Message from ARIAN Doran sent at 3/5/2024  1:23 PM EST -----  Please let patient know that his echocardiogram looks very good. His EF is normal and it is essentially unchanged from previous study of 2020.

## 2024-04-12 ENCOUNTER — REMOTE DEVICE CLINIC VISIT (OUTPATIENT)
Dept: CARDIOLOGY CLINIC | Facility: CLINIC | Age: 79
End: 2024-04-12
Payer: MEDICARE

## 2024-04-12 ENCOUNTER — TELEPHONE (OUTPATIENT)
Dept: CARDIOLOGY CLINIC | Facility: CLINIC | Age: 79
End: 2024-04-12

## 2024-04-12 DIAGNOSIS — Z95.0 PRESENCE OF PERMANENT CARDIAC PACEMAKER: Primary | ICD-10-CM

## 2024-04-12 PROCEDURE — 93294 REM INTERROG EVL PM/LDLS PM: CPT | Performed by: INTERNAL MEDICINE

## 2024-04-12 PROCEDURE — 93296 REM INTERROG EVL PM/IDS: CPT | Performed by: INTERNAL MEDICINE

## 2024-04-12 NOTE — TELEPHONE ENCOUNTER
----- Message from Tavo Diehl MD sent at 4/12/2024 11:30 AM EDT -----  Patient's device interrogation reading shows,  device function is normal.  Patient has episode of SVT and NSVT.  He is on metoprolol EF is acceptable.  He should be seen by someone in the office as he used to see Dr. Irvin.      Please call patient.

## 2024-04-12 NOTE — PROGRESS NOTES
Results for orders placed or performed in visit on 04/12/24   Cardiac EP device report    Narrative    MDT DUAL CHAMBER PM/ACTIVE SYSTEM IS MRI CONDITIONAL  CARELINK TRANSMISSION: BATTERY VOLTAGE ADEQUATE. (10.3 YRS) AP 90%  1%. ALL AVAILABLE LEAD PARAMETERS WITHIN NORMAL LIMITS. 10 VHR EPISODES DETECTED SVT AND NSVT NOTED. PATIENT IS ON ELIQUIS AND METOPROLOL; EF 55%(2020). NORMAL DEVICE FUNCTION.---STEELE

## 2024-07-19 ENCOUNTER — REMOTE DEVICE CLINIC VISIT (OUTPATIENT)
Dept: CARDIOLOGY CLINIC | Facility: CLINIC | Age: 79
End: 2024-07-19
Payer: MEDICARE

## 2024-07-19 DIAGNOSIS — Z95.0 PRESENCE OF PERMANENT CARDIAC PACEMAKER: Primary | ICD-10-CM

## 2024-07-19 PROCEDURE — 93294 REM INTERROG EVL PM/LDLS PM: CPT | Performed by: INTERNAL MEDICINE

## 2024-07-19 PROCEDURE — 93296 REM INTERROG EVL PM/IDS: CPT | Performed by: INTERNAL MEDICINE

## 2024-07-19 NOTE — PROGRESS NOTES
Results for orders placed or performed in visit on 07/19/24   Cardiac EP device report    Narrative    MDT DUAL CHAMBER PM/ACTIVE SYSTEM IS MRI CONDITIONAL  CARELINK TRANSMISSION: BATTERY VOLTAGE ADEQUATE. (9.9 YRS) AP 92%  1%. ALL AVAILABLE LEAD PARAMETERS WITHIN NORMAL LIMITS. 13 VHR EPISODES DETECTED MOST RECENT 8 BEATS @ 290ms. PATIENT IS ON METOPROLOL SUCC AND ELIQUIS. EF 55% (2024). NORMAL DEVICE FUNCTION.---STEELE

## 2024-08-06 DIAGNOSIS — I48.92 PAROXYSMAL ATRIAL FLUTTER (HCC): ICD-10-CM

## 2024-08-07 RX ORDER — METOPROLOL SUCCINATE 25 MG/1
25 TABLET, EXTENDED RELEASE ORAL DAILY
Qty: 100 TABLET | Refills: 1 | Status: SHIPPED | OUTPATIENT
Start: 2024-08-07

## 2024-08-19 DIAGNOSIS — I48.92 PAROXYSMAL ATRIAL FLUTTER (HCC): ICD-10-CM

## 2024-08-19 RX ORDER — METOPROLOL SUCCINATE 25 MG/1
25 TABLET, EXTENDED RELEASE ORAL DAILY
Qty: 100 TABLET | Refills: 1 | Status: SHIPPED | OUTPATIENT
Start: 2024-08-19

## 2024-08-28 DIAGNOSIS — I48.92 PAROXYSMAL ATRIAL FLUTTER (HCC): ICD-10-CM

## 2024-10-18 ENCOUNTER — REMOTE DEVICE CLINIC VISIT (OUTPATIENT)
Dept: CARDIOLOGY CLINIC | Facility: CLINIC | Age: 79
End: 2024-10-18
Payer: MEDICARE

## 2024-10-18 DIAGNOSIS — Z95.0 PRESENCE OF PERMANENT CARDIAC PACEMAKER: Primary | ICD-10-CM

## 2024-10-18 PROCEDURE — 93296 REM INTERROG EVL PM/IDS: CPT | Performed by: INTERNAL MEDICINE

## 2024-10-18 PROCEDURE — 93294 REM INTERROG EVL PM/LDLS PM: CPT | Performed by: INTERNAL MEDICINE

## 2024-10-18 NOTE — PROGRESS NOTES
Results for orders placed or performed in visit on 10/18/24   Cardiac EP device report    Narrative    MDT DUAL CHAMBER PM/ACTIVE SYSTEM IS MRI CONDITIONAL  CARELINK TRANSMISSION: BATTERY VOLTAGE ADEQUATE. (9.7 YRS) AP 82%  1%. ALL AVAILABLE LEAD PARAMETERS WITHIN NORMAL LIMITS. 239VHR EPISODES AND 51 FAST A & V EPISODES DETECTED, NSVT, PACS AND SVT NOTED; PATIENT IS ON METOPROLOL SUCC AND ELIQUIS. EF 55% (2024). NORMAL DEVICE FUNCTION.---STEELE

## 2025-01-22 ENCOUNTER — OFFICE VISIT (OUTPATIENT)
Dept: CARDIOLOGY CLINIC | Facility: CLINIC | Age: 80
End: 2025-01-22
Payer: MEDICARE

## 2025-01-22 VITALS
HEART RATE: 83 BPM | WEIGHT: 201 LBS | BODY MASS INDEX: 30.46 KG/M2 | DIASTOLIC BLOOD PRESSURE: 80 MMHG | OXYGEN SATURATION: 99 % | SYSTOLIC BLOOD PRESSURE: 120 MMHG | HEIGHT: 68 IN

## 2025-01-22 DIAGNOSIS — E78.5 DYSLIPIDEMIA: ICD-10-CM

## 2025-01-22 DIAGNOSIS — Z79.01 ON CONTINUOUS ORAL ANTICOAGULATION: ICD-10-CM

## 2025-01-22 DIAGNOSIS — I11.9 HYPERTENSIVE HEART DISEASE WITHOUT HEART FAILURE: ICD-10-CM

## 2025-01-22 DIAGNOSIS — I49.5 TACHY-BRADY SYNDROME (HCC): ICD-10-CM

## 2025-01-22 DIAGNOSIS — I48.92 PAROXYSMAL ATRIAL FLUTTER (HCC): ICD-10-CM

## 2025-01-22 DIAGNOSIS — I72.3 ANEURYSM OF RIGHT INTERNAL ILIAC ARTERY (HCC): ICD-10-CM

## 2025-01-22 DIAGNOSIS — E66.811 CLASS 1 OBESITY: ICD-10-CM

## 2025-01-22 DIAGNOSIS — Z95.0 PRESENCE OF PERMANENT CARDIAC PACEMAKER: ICD-10-CM

## 2025-01-22 PROCEDURE — 93000 ELECTROCARDIOGRAM COMPLETE: CPT | Performed by: INTERNAL MEDICINE

## 2025-01-22 PROCEDURE — 99215 OFFICE O/P EST HI 40 MIN: CPT | Performed by: INTERNAL MEDICINE

## 2025-01-22 RX ORDER — METOPROLOL SUCCINATE 50 MG/1
50 TABLET, EXTENDED RELEASE ORAL DAILY
Qty: 90 TABLET | Refills: 1 | Status: SHIPPED | OUTPATIENT
Start: 2025-01-22

## 2025-01-22 RX ORDER — LISINOPRIL 10 MG/1
10 TABLET ORAL DAILY
Qty: 90 TABLET | Refills: 1 | Status: SHIPPED | OUTPATIENT
Start: 2025-01-22

## 2025-01-22 RX ORDER — AMLODIPINE BESYLATE 5 MG/1
5 TABLET ORAL DAILY
Qty: 90 TABLET | Refills: 1 | Status: SHIPPED | OUTPATIENT
Start: 2025-01-22 | End: 2025-07-21

## 2025-01-22 NOTE — PROGRESS NOTES
Progress Note - Cardiology Office  [facility]    Adalid Mancilla 80 y.o. 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  Hypertensive heart disease without heart failure.  Patient had history of blood pressure.  He is currently taking lisinopril 20 mg daily amlodipine 5 mg daily and metoprolol XL 25 mg daily.  Potassium is 5.2.  He is feeling episodes of dizziness when he quickly gets up.  And his pacemaker interrogation shows episodes of short atrial runs and few beat NSVT.  I took the liberty of increasing his metoprolol to 50 mg daily and cut back his lisinopril to 10 mg as he is feeling dizzy and lightheaded and his potassium was 5.2.      2.  Paroxysmal atrial flutter.  Patient is known to have atrial flutter.  Increase metoprolol to 50 mg twice a day..  Continue Eliquis pacemaker interrogation shows no episode noted so far but short atrial runs noted.    3.  Dyslipidemia continue statins.    4.  Sick sinus syndrome with the tachycardia-bradycardia syndrome.  Status post Medtronic dual-chamber pacemaker which is functioning adequately device was interrogated in October and episodes of short atrial run and brief NSVT noted.    5.  Class I obesity with a BMI around 30 advised him to increase activities    6.  Aneurysm of right internal iliac artery.  Continue medical Rx with statins.  We will do periodically ultrasound.    Plan.    Increase metoprolol XL to 50 mg daily    Continue amlodipine 5 mg daily    Decrease lisinopril to 10 mg daily    Continue other medication as before and follow-up in  6 months.    Patient / Caretaker was advised and educated to call our office  immediately if  patient has any new symptoms of chest pain/shortness of breath, near-syncope, syncope, light headedness sustained palpitations  or any other cardiovascular symptoms before their scheduled follow-up appointment.  Office number was provided # [phone]  Please call [phone] if any questions.  Counseling :  A description of the counseling.  Goals and Barriers.  Patient's ability to self care: Yes  Medication side effect reviewed with patient in detail and all their questions answered to their satisfaction.    HPI :     Adalid Mancilla is a 80 y.o. year old male who came for follow up.  Patient used to follow-up with Dr. Irvin.  He has a medical history significant for Medtronic [place] XT DR MRI permanent pacemaker implanted 8/18/2020, history of hypertensive heart disease, history of chronic PACs around 4.8% on Holter in 2018, impaired glucose metabolism, history of prior hyperkalemia and azotemia on ACE inhibitor's, history of right renal mass status post cryoablation on 517 1212 at [facility], dyslipidemia, history of BPH, chronic class I obesity, history of nonischemic nuclear on 10/4/2016 and 2020, history of laparoscopic cholecystectomy at [place] in February 2020, history of 3.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, history of tachycardia-bradycardia syndrome, episode of NSVT on pacemaker who came for follow-up.  Patient was also evaluated by our nurse practitioner in February 2024.    His device was interrogated in October 2024 which shows he is about 82% atrial paced and there were episodes of fast AV episodes including SVT and SVT.  And he was put on metoprolol succinate at a lower dose.  He feels overall well occasionally get dizziness but he denies any palpitations his blood pressure has been acceptable.  He has blood test done 12/20/2024 which has been acceptable potassium was 5.2    Review of Systems    Constitutional:  Negative for activity change, chills, diaphoresis, fever and unexpected weight change.   HENT:  Positive for hearing loss. Negative for congestion.    Eyes:  Negative for discharge and redness.   Respiratory:  Negative for cough, chest tightness, shortness of breath and wheezing.    Cardiovascular: Negative.  Negative for chest pain, palpitations and leg swelling.   Gastrointestinal:  Negative for abdominal pain, diarrhea and nausea.   Endocrine: Negative.    Genitourinary:  Negative for decreased urine volume and urgency.   Musculoskeletal:  Positive for arthralgias. Negative for back pain and gait problem.   Skin:  Negative for rash and wound.   Allergic/Immunologic: Negative.    Neurological:  Positive for light-headedness. Negative for dizziness, seizures, syncope, weakness and headaches.   Hematological: Negative.    Psychiatric/Behavioral:  Negative for agitation and confusion. The patient is not nervous/anxious.        Historical Information   Past Medical History:   Diagnosis Date   • Atrial tachycardia, paroxysmal (HCC) 01/09/2018    from allscripts chart   • Bradycardia 01/09/2018    from allscripts chart   • Dyslipidemia    • Essential hypertension    • Impaired fasting glucose    • Premature atrial contractions    • Sinus bradycardia-tachycardia syndrome (HCC)      Past Surgical History:   Procedure Laterality Date   • CHOLECYSTECTOMY LAPAROSCOPIC N/A 02/03/2020    Procedure: CHOLECYSTECTOMY LAPAROSCOPIC;  Surgeon: Hugo Hatch DO;  Location: [facility];  Service: General   • COLONOSCOPY  02/2010    SABINE Hoskins MD.- Normal except for mild diverticulosis.   • COLONOSCOPY  11/2004    SABINE Hoskins MD.- Diverticulosis wiht spasms.   • COLONOSCOPY W/ BIOPSIES  11/2017    APOLLO Hoskins MD.-One 3mm polyp in the mid ascending colon, diverticulosis in the sigmoid colon and in the distal descending colon, otherwise normal examination. Bx: Colonic mucosa with features consistent  iwth sessile serrated adenoma. there is no evidence of high grade dysplasia present.   • COLONOSCOPY W/ BIOPSIES  08/2012    SABINE Hoskins MD.- Diverticulosis. Bx: normal   • COLONOSCOPY W/ BIOPSIES  09/2000    SABINE Hoskins MD.- Polyp at 18cm. Bx: Benign hyperplastic polyp.   • CT GUIDED FINE NEEDLE ASPIRATION (ALL INC)  02/28/2020     Social History     Substance and Sexual Activity   Alcohol Use Yes   • Alcohol/week: 2.0 standard drinks of alcohol   • Types: 2 Glasses of wine per week    Comment: Daily     Social History     Substance and Sexual Activity   Drug Use No     Social History     Tobacco Use   Smoking Status Never   Smokeless Tobacco Never     Family History:   Family History   Problem Relation Age of Onset   • Microcephaly Father    • Heart attack Father    • Heart failure Father        Meds/Allergies     No Known Allergies    Current Outpatient Medications:   •  Acai 500 MG CAPS, Take by mouth, Disp: , Rfl:   •  amLODIPine (NORVASC) 5 mg tablet, Take 1 tablet (5 mg total) by mouth daily, Disp: 90 tablet, Rfl: 1  •  apixaban (Eliquis) 5 mg, Take 1 tablet (5 mg total) by mouth 2 (two) times a day, Disp: 180 tablet, Rfl: 1  •  atorvastatin (LIPITOR) 10 mg tablet, Take 1 tablet (10 mg total) by mouth daily, Disp: 90 tablet, Rfl: 3  •  colchicine (COLCRYS) 0.6 mg tablet, Take 2 tablets immediately, repeat with one tablet 2 hours later, Disp: , Rfl:   •  glucosamine-chondroitin 500-400 MG tablet, Take 1 tablet by mouth 3 (three) times a day, Disp: , Rfl:   •  lisinopril (ZESTRIL) 10 mg tablet, Take 1 tablet (10 mg total) by mouth daily, Disp: 90 tablet, Rfl: 1  •  meclizine (ANTIVERT) 25 mg tablet, , Disp: , Rfl:   •  meloxicam (MOBIC) 15 mg tablet, Take 15 mg by mouth daily, Disp: , Rfl:   •  metoprolol succinate (TOPROL-XL) 50 mg 24 hr tablet, Take 1 tablet (50 mg total) by mouth daily, Disp: 90 tablet, Rfl: 1  •  Misc Natural Products (TART CHERRY ADVANCED PO), Take by mouth, Disp: , Rfl:   •   "Multiple Vitamin (DAILY VALUE MULTIVITAMIN) TABS, Take 1 tablet by mouth daily, Disp: , Rfl:   •  Probiotic Product (ACIDOPHILUS/BIFIDUS PROBIOTIC PO), Take by mouth, Disp: , Rfl:   •  tamsulosin (FLOMAX) 0.4 mg, Take 0.4 mg by mouth, Disp: , Rfl:   •  ondansetron (ZOFRAN-ODT) 4 mg disintegrating tablet, Take 1 tablet (4 mg total) by mouth every 8 (eight) hours as needed for nausea or vomiting for up to 7 days, Disp: 20 tablet, Rfl: 0    Vitals: Blood pressure 120/80, pulse 83, height 5' 8\" (1.727 m), weight 91.2 kg (201 lb), SpO2 99%.    Body mass index is 30.56 kg/m².  Wt Readings from Last 3 Encounters:   01/22/25 91.2 kg (201 lb)   02/27/24 94.6 kg (208 lb 8.9 oz)   02/16/24 94.6 kg (208 lb 8 oz)     Vitals:    01/22/25 1050   Weight: 91.2 kg (201 lb)     BP Readings from Last 3 Encounters:   01/22/25 120/80   02/27/24 148/83   02/16/24 120/90       Physical Exam:  Neurologic:  Alert & oriented x 3, no new focal deficits, Not in any acute distress,  Constitutional:  Adequate built, non-toxic appearance   Neck: Normal range of motion, no tenderness,  Neck supple   Respiratory:  Bilateral air entry, mostly clear to auscultation  Cardiovascular: S1-S2 regular with a 3/6 ejection systolic murmur and S4 is present  GI:  Soft, nondistended,  nontender, no hepatosplenomegaly appreciated.  Musculoskeletal:  No edema, no tenderness, no deformities.   Skin:  Well hydrated, no rash   Extremities:  No edema and distal pulses are present  Psychiatric:  Speech and behavior appropriate         Diagnostic Studies Review Cardio:    Echo Doppler done on February 2024 shows EF 55%, grade 1 dysfunction, left and mildly dilated, mild TR with PA pressure 43 mmHg and mild MR.  There is aortic valve sclerosis without any aortic stenosis.    Nuclear stress test in August 2020 shows patient has severe paradoxical myocardial perfusion defect due to body attenuation artifact.        Device interrogation in July 2024 shows patient is 92% " "atrial paced still have about 9 years battery.    EKG:  Twelve-lead EKG done on 1/22/2025 atrial paced heart rate 83 bpm.  Rare PVCs noted.    Lab Review   Lab Results   Component Value Date    WBC 8.00 01/01/2021    HGB 15.7 01/01/2021    HCT 48.0 01/01/2021    MCV 95 01/01/2021    RDW 13.5 01/01/2021     01/01/2021     BMP:  Lab Results   Component Value Date    SODIUM 141 12/20/2024    K 5.2 12/20/2024     12/20/2024    CO2 28 12/20/2024    BUN 34 (H) 12/20/2024    CREATININE 1.18 12/20/2024    GLUC 105 (H) 12/20/2024    GLUF 106 (H) 09/09/2021    CALCIUM 9.4 12/20/2024    EGFR 62 12/20/2024    MG 2.0 01/01/2021       LFT:  Lab Results   Component Value Date    AST 21 12/20/2024    ALT 28 12/20/2024    ALKPHOS 72 12/20/2024    TP 7.1 12/20/2024    ALB 4.3 12/20/2024      No components found for: \"TSH3\"  Lab Results   Component Value Date    XDB7KVLUFOIC 1.759 01/01/2021     Lab Results   Component Value Date    HGBA1C 5.9 (H) 07/29/2024     Lipid Profile:   Lab Results   Component Value Date    CHOLESTEROL 158 09/09/2021    HDL 39 (L) 09/09/2021    LDLCALC 84 09/09/2021    TRIG 173 (H) 09/09/2021     Lab Results   Component Value Date    CHOLESTEROL 158 09/09/2021    CHOLESTEROL 159 08/29/2019     Lab Results   Component Value Date    TROPONINI 0.03 01/01/2021             Dr. Tavo Diehl MD [facility]        \"This note was completed in part utilizing m-modal fluency direct voice recognition software.   Grammatical errors, random word insertion, spelling mistakes, and incomplete sentences may be an occasional consequence of the system secondary to software limitations, ambient noise and hardware issues.    Please read the chart carefully and recognize, using context, where substitutions have occurred.  If you have any questions or concerns about the context, text or information contained within the body of this dictation, please contact myself, the provider, for further clarification.\"  "

## 2025-01-22 NOTE — PATIENT INSTRUCTIONS
Increase metoprolol XL to 50 mg daily    Continue amlodipine 5 mg daily    Decrease lisinopril to 10 mg daily

## 2025-01-31 ENCOUNTER — REMOTE DEVICE CLINIC VISIT (OUTPATIENT)
Dept: CARDIOLOGY CLINIC | Facility: CLINIC | Age: 80
End: 2025-01-31
Payer: MEDICARE

## 2025-01-31 ENCOUNTER — RESULTS FOLLOW-UP (OUTPATIENT)
Dept: CARDIOLOGY CLINIC | Facility: CLINIC | Age: 80
End: 2025-01-31

## 2025-01-31 DIAGNOSIS — Z95.0 PRESENCE OF PERMANENT CARDIAC PACEMAKER: Primary | ICD-10-CM

## 2025-01-31 PROCEDURE — 93294 REM INTERROG EVL PM/LDLS PM: CPT | Performed by: INTERNAL MEDICINE

## 2025-01-31 PROCEDURE — 93296 REM INTERROG EVL PM/IDS: CPT | Performed by: INTERNAL MEDICINE

## 2025-01-31 NOTE — PROGRESS NOTES
Results for orders placed or performed in visit on 01/31/25   Cardiac EP device report    Narrative    MDT DUAL CHAMBER PM/ACTIVE SYSTEM IS MRI CONDITIONAL  CARELINK TRANSMISSION: BATTERY VOLTAGE ADEQUATE. (9.4 YRS) AP 82%  1%. ALL AVAILABLE LEAD PARAMETERS WITHIN NORMAL LIMITS. 71 VHR EPISODES DECTECTED MOST RECENT 1 BEATS @ 380ms. 6 FAST A & V EPISODES DETECTED, SVT NOTED; PATIENT IS ON METOPROLOL SUCC AND ELIQUIS. EF 55% (2024). NORMAL DEVICE FUNCTION.---STEELE

## 2025-02-24 ENCOUNTER — IN-CLINIC DEVICE VISIT (OUTPATIENT)
Dept: CARDIOLOGY CLINIC | Facility: CLINIC | Age: 80
End: 2025-02-24
Payer: MEDICARE

## 2025-02-24 ENCOUNTER — RESULTS FOLLOW-UP (OUTPATIENT)
Dept: CARDIOLOGY CLINIC | Facility: CLINIC | Age: 80
End: 2025-02-24

## 2025-02-24 DIAGNOSIS — Z95.0 CARDIAC PACEMAKER IN SITU: Primary | ICD-10-CM

## 2025-02-24 PROCEDURE — 93280 PM DEVICE PROGR EVAL DUAL: CPT | Performed by: INTERNAL MEDICINE

## 2025-02-24 NOTE — PROGRESS NOTES
Results for orders placed or performed in visit on 02/24/25   Cardiac EP device report    Narrative    MDT DUAL CHAMBER PM/ACTIVE SYSTEM IS MRI CONDITIONAL  DEVICE INTERROGATED IN THE [place] OFFICE: PRESENTING EGRAM AP VS@ 83 BPM. BATTERY VOLTAGE ADEQUATE-9 YRS. AP 87%  0%. ALL AVAILABLE LEAD PARAMETERS & TRENDS WITHIN NORMAL LIMITS. PVCS/PACS NOTED. MULTIPLE VHRS/FAST A/V NOTED. RATES >115 BPM. PT ON METO SUCC & ELIQUIS. EF 55% (ECHO 2024). AVAIL EGRAMS PRESENT AS SVT & NSVT. ASYMPTOMATIC. NORMAL DEVICE FUNCTION. NC

## 2025-03-26 DIAGNOSIS — I48.92 PAROXYSMAL ATRIAL FLUTTER (HCC): ICD-10-CM

## 2025-03-26 RX ORDER — APIXABAN 5 MG/1
5 TABLET, FILM COATED ORAL 2 TIMES DAILY
Qty: 180 TABLET | Refills: 1 | Status: SHIPPED | OUTPATIENT
Start: 2025-03-26

## 2025-03-26 NOTE — TELEPHONE ENCOUNTER
Patient called rx refill line wanting to expedite this refill request, as he wasn't aware the pharmacy did not have a refill for him. High priority placed upon request.

## 2025-05-29 ENCOUNTER — RESULTS FOLLOW-UP (OUTPATIENT)
Dept: CARDIOLOGY CLINIC | Facility: CLINIC | Age: 80
End: 2025-05-29

## 2025-05-29 ENCOUNTER — REMOTE DEVICE CLINIC VISIT (OUTPATIENT)
Dept: CARDIOLOGY CLINIC | Facility: CLINIC | Age: 80
End: 2025-05-29
Payer: MEDICARE

## 2025-05-29 DIAGNOSIS — Z95.0 PRESENCE OF PERMANENT CARDIAC PACEMAKER: Primary | ICD-10-CM

## 2025-05-29 PROCEDURE — 93296 REM INTERROG EVL PM/IDS: CPT | Performed by: INTERNAL MEDICINE

## 2025-05-29 PROCEDURE — 93294 REM INTERROG EVL PM/LDLS PM: CPT | Performed by: INTERNAL MEDICINE

## 2025-05-29 NOTE — PROGRESS NOTES
Results for orders placed or performed in visit on 05/29/25   Cardiac EP device report    Narrative    MDT DUAL CHAMBER PM/ACTIVE SYSTEM IS MRI CONDITIONAL  CARELINK TRANSMISSION: BATTERY VOLTAGE ADEQUATE. (9.1 YRS) AP 93%  1%. ALL AVAILABLE LEAD PARAMETERS WITHIN NORMAL LIMITS. 258 VHR EPISODES DETECTED MOST SVT, WITH SOME ATRIAL UNDERSENSING.. 176 FAST A & V EPISODES DETECTED, SVT NOTED; SINGLE .4/HOUR; PVC RUNS 20.4/HOUR. PATIENT IS ON METOPROLOL SUCC AND ELIQUIS. EF 55% (2024). NORMAL DEVICE FUNCTION.---STEELE

## 2025-06-12 DIAGNOSIS — I48.92 PAROXYSMAL ATRIAL FLUTTER (HCC): ICD-10-CM

## 2025-06-12 RX ORDER — METOPROLOL SUCCINATE 25 MG/1
25 TABLET, EXTENDED RELEASE ORAL DAILY
Qty: 90 TABLET | Refills: 3 | Status: SHIPPED | OUTPATIENT
Start: 2025-06-12

## 2025-07-03 DIAGNOSIS — I11.9 HYPERTENSIVE HEART DISEASE WITHOUT HEART FAILURE: ICD-10-CM

## 2025-07-03 RX ORDER — LISINOPRIL 10 MG/1
10 TABLET ORAL DAILY
Qty: 90 TABLET | Refills: 1 | Status: SHIPPED | OUTPATIENT
Start: 2025-07-03

## 2025-07-21 DIAGNOSIS — I11.9 HYPERTENSIVE HEART DISEASE WITHOUT HEART FAILURE: ICD-10-CM

## 2025-07-23 RX ORDER — AMLODIPINE BESYLATE 5 MG/1
5 TABLET ORAL DAILY
Qty: 90 TABLET | Refills: 1 | Status: SHIPPED | OUTPATIENT
Start: 2025-07-23

## 2025-08-19 ENCOUNTER — OFFICE VISIT (OUTPATIENT)
Dept: CARDIOLOGY CLINIC | Facility: CLINIC | Age: 80
End: 2025-08-19
Payer: MEDICARE

## 2025-08-19 VITALS
DIASTOLIC BLOOD PRESSURE: 74 MMHG | OXYGEN SATURATION: 97 % | WEIGHT: 201 LBS | SYSTOLIC BLOOD PRESSURE: 124 MMHG | BODY MASS INDEX: 30.46 KG/M2 | HEART RATE: 79 BPM | HEIGHT: 68 IN

## 2025-08-19 DIAGNOSIS — Z95.0 PRESENCE OF PERMANENT CARDIAC PACEMAKER: ICD-10-CM

## 2025-08-19 DIAGNOSIS — Z79.01 ON CONTINUOUS ORAL ANTICOAGULATION: ICD-10-CM

## 2025-08-19 DIAGNOSIS — I49.5 TACHY-BRADY SYNDROME (HCC): ICD-10-CM

## 2025-08-19 DIAGNOSIS — E78.5 DYSLIPIDEMIA: ICD-10-CM

## 2025-08-19 DIAGNOSIS — E66.811 CLASS 1 OBESITY: ICD-10-CM

## 2025-08-19 DIAGNOSIS — I11.9 HYPERTENSIVE HEART DISEASE WITHOUT HEART FAILURE: ICD-10-CM

## 2025-08-19 DIAGNOSIS — R06.09 DOE (DYSPNEA ON EXERTION): ICD-10-CM

## 2025-08-19 DIAGNOSIS — I48.92 PAROXYSMAL ATRIAL FLUTTER (HCC): ICD-10-CM

## 2025-08-19 PROCEDURE — 99214 OFFICE O/P EST MOD 30 MIN: CPT | Performed by: INTERNAL MEDICINE

## 2025-08-19 PROCEDURE — 93000 ELECTROCARDIOGRAM COMPLETE: CPT | Performed by: INTERNAL MEDICINE

## (undated) DEVICE — DRAPE EQUIPMENT RF WAND

## (undated) DEVICE — TROCAR: Brand: KII FIOS FIRST ENTRY

## (undated) DEVICE — DRAIN SPONGES,6 PLY: Brand: EXCILON

## (undated) DEVICE — SUT VICRYL 0 UR-6 27 IN J603H

## (undated) DEVICE — NEEDLE 22 G X 1 1/2 SAFETY

## (undated) DEVICE — GLOVE SRG BIOGEL ORTHOPEDIC 8

## (undated) DEVICE — COTTON TIP APPLICTOR 2 PK

## (undated) DEVICE — INTENDED FOR TISSUE SEPARATION, AND OTHER PROCEDURES THAT REQUIRE A SHARP SURGICAL BLADE TO PUNCTURE OR CUT.: Brand: BARD-PARKER SAFETY BLADES SIZE 11, STERILE

## (undated) DEVICE — VIAL DECANTER

## (undated) DEVICE — TROCAR: Brand: KII® SLEEVE

## (undated) DEVICE — ALLENTOWN LAP CHOLE APP PACK: Brand: CARDINAL HEALTH

## (undated) DEVICE — LIGAMAX 5 MM ENDOSCOPIC MULTIPLE CLIP APPLIER: Brand: LIGAMAX

## (undated) DEVICE — ADHESIVE SKIN HIGH VISCOSITY EXOFIN 1ML

## (undated) DEVICE — CHLORAPREP HI-LITE 26ML ORANGE

## (undated) DEVICE — LIGHT HANDLE COVER SLEEVE DISP BLUE STELLAR

## (undated) DEVICE — HARMONIC 1100 SHEARS, 36CM SHAFT LENGTH: Brand: HARMONIC

## (undated) DEVICE — IRRIG ENDO FLO TUBING

## (undated) DEVICE — SUT MONOCRYL 4-0 PS-2 27 IN Y426H